# Patient Record
Sex: MALE | Race: WHITE | NOT HISPANIC OR LATINO | Employment: FULL TIME | URBAN - METROPOLITAN AREA
[De-identification: names, ages, dates, MRNs, and addresses within clinical notes are randomized per-mention and may not be internally consistent; named-entity substitution may affect disease eponyms.]

---

## 2018-10-10 ENCOUNTER — OFFICE VISIT (OUTPATIENT)
Dept: INTERNAL MEDICINE | Facility: CLINIC | Age: 33
End: 2018-10-10

## 2018-10-10 VITALS
BODY MASS INDEX: 27.58 KG/M2 | HEART RATE: 82 BPM | DIASTOLIC BLOOD PRESSURE: 72 MMHG | OXYGEN SATURATION: 97 % | WEIGHT: 182 LBS | SYSTOLIC BLOOD PRESSURE: 120 MMHG | TEMPERATURE: 97.9 F | HEIGHT: 68 IN

## 2018-10-10 DIAGNOSIS — Z87.898 HISTORY OF ALCOHOL USE: ICD-10-CM

## 2018-10-10 DIAGNOSIS — M54.6 ACUTE MIDLINE THORACIC BACK PAIN: Primary | ICD-10-CM

## 2018-10-10 DIAGNOSIS — M54.2 NECK PAIN: ICD-10-CM

## 2018-10-10 LAB
ALBUMIN SERPL-MCNC: 4.56 G/DL (ref 3.2–4.8)
ALBUMIN/GLOB SERPL: 1.9 G/DL (ref 1.5–2.5)
ALP SERPL-CCNC: 54 U/L (ref 25–100)
ALT SERPL W P-5'-P-CCNC: 17 U/L (ref 7–40)
ANION GAP SERPL CALCULATED.3IONS-SCNC: 4 MMOL/L (ref 3–11)
AST SERPL-CCNC: 34 U/L (ref 0–33)
BASOPHILS # BLD AUTO: 0.08 10*3/MM3 (ref 0–0.2)
BASOPHILS NFR BLD AUTO: 1 % (ref 0–1)
BILIRUB SERPL-MCNC: 0.4 MG/DL (ref 0.3–1.2)
BUN BLD-MCNC: 12 MG/DL (ref 9–23)
BUN/CREAT SERPL: 12.9 (ref 7–25)
CALCIUM SPEC-SCNC: 9.7 MG/DL (ref 8.7–10.4)
CHLORIDE SERPL-SCNC: 103 MMOL/L (ref 99–109)
CO2 SERPL-SCNC: 28 MMOL/L (ref 20–31)
CREAT BLD-MCNC: 0.93 MG/DL (ref 0.6–1.3)
DEPRECATED RDW RBC AUTO: 54.5 FL (ref 37–54)
EOSINOPHIL # BLD AUTO: 0.15 10*3/MM3 (ref 0–0.3)
EOSINOPHIL NFR BLD AUTO: 1.8 % (ref 0–3)
ERYTHROCYTE [DISTWIDTH] IN BLOOD BY AUTOMATED COUNT: 15.9 % (ref 11.3–14.5)
GFR SERPL CREATININE-BSD FRML MDRD: 94 ML/MIN/1.73
GLOBULIN UR ELPH-MCNC: 2.4 GM/DL
GLUCOSE BLD-MCNC: 86 MG/DL (ref 70–100)
HCT VFR BLD AUTO: 43.5 % (ref 38.9–50.9)
HGB BLD-MCNC: 14.5 G/DL (ref 13.1–17.5)
IMM GRANULOCYTES # BLD: 0.04 10*3/MM3 (ref 0–0.03)
IMM GRANULOCYTES NFR BLD: 0.5 % (ref 0–0.6)
LYMPHOCYTES # BLD AUTO: 2.81 10*3/MM3 (ref 0.6–4.8)
LYMPHOCYTES NFR BLD AUTO: 33.5 % (ref 24–44)
MCH RBC QN AUTO: 30.9 PG (ref 27–31)
MCHC RBC AUTO-ENTMCNC: 33.3 G/DL (ref 32–36)
MCV RBC AUTO: 92.6 FL (ref 80–99)
MONOCYTES # BLD AUTO: 0.94 10*3/MM3 (ref 0–1)
MONOCYTES NFR BLD AUTO: 11.2 % (ref 0–12)
NEUTROPHILS # BLD AUTO: 4.41 10*3/MM3 (ref 1.5–8.3)
NEUTROPHILS NFR BLD AUTO: 52.5 % (ref 41–71)
PLATELET # BLD AUTO: 290 10*3/MM3 (ref 150–450)
PMV BLD AUTO: 10.8 FL (ref 6–12)
POTASSIUM BLD-SCNC: 4.3 MMOL/L (ref 3.5–5.5)
PROT SERPL-MCNC: 7 G/DL (ref 5.7–8.2)
RBC # BLD AUTO: 4.7 10*6/MM3 (ref 4.2–5.76)
SODIUM BLD-SCNC: 135 MMOL/L (ref 132–146)
WBC NRBC COR # BLD: 8.39 10*3/MM3 (ref 3.5–10.8)

## 2018-10-10 PROCEDURE — 99203 OFFICE O/P NEW LOW 30 MIN: CPT | Performed by: NURSE PRACTITIONER

## 2018-10-10 PROCEDURE — 85025 COMPLETE CBC W/AUTO DIFF WBC: CPT | Performed by: NURSE PRACTITIONER

## 2018-10-10 PROCEDURE — 80053 COMPREHEN METABOLIC PANEL: CPT | Performed by: NURSE PRACTITIONER

## 2018-10-10 RX ORDER — NAPROXEN 250 MG/1
250 TABLET ORAL 2 TIMES DAILY PRN
Qty: 60 TABLET | Refills: 3 | Status: SHIPPED | OUTPATIENT
Start: 2018-10-10 | End: 2020-02-19

## 2018-10-10 RX ORDER — CYCLOBENZAPRINE HCL 10 MG
10 TABLET ORAL 3 TIMES DAILY PRN
Qty: 60 TABLET | Refills: 0 | Status: SHIPPED | OUTPATIENT
Start: 2018-10-10 | End: 2018-10-19 | Stop reason: DRUGHIGH

## 2018-10-10 RX ORDER — OMEPRAZOLE 40 MG/1
40 CAPSULE, DELAYED RELEASE ORAL DAILY
Qty: 30 CAPSULE | Refills: 1 | Status: SHIPPED | OUTPATIENT
Start: 2018-10-10 | End: 2020-02-19

## 2018-10-10 NOTE — PATIENT INSTRUCTIONS
Heat Therapy  Heat therapy can help ease sore, stiff, injured, and tight muscles and joints. Heat relaxes your muscles, which may help ease your pain.  What are the risks?  If you have any of the following conditions, do not use heat therapy unless your health care provider has approved:  · Poor circulation.  · Healing wounds or scarred skin in the area being treated.  · Diabetes, heart disease, or high blood pressure.  · Not being able to feel (numbness) the area being treated.  · Unusual swelling of the area being treated.  · Active infections.  · Blood clots.  · Cancer.  · Inability to communicate pain. This may include young children and people who have problems with their brain function (dementia).  · Pregnancy.    Heat therapy should only be used on old, pre-existing, or long-lasting (chronic) injuries. Do not use heat therapy on new injuries unless directed by your health care provider.  How to use heat therapy  There are several different kinds of heat therapy, including:  · Moist heat pack.  · Warm water bath.  · Hot water bottle.  · Electric heating pad.  · Heated gel pack.  · Heated wrap.  · Electric heating pad.    Use the heat therapy method suggested by your health care provider. Follow your health care provider's instructions on when and how to use heat therapy.  General heat therapy recommendations  · Do not sleep while using heat therapy. Only use heat therapy while you are awake.  · Your skin may turn pink while using heat therapy. Do not use heat therapy if your skin turns red.  · Do not use heat therapy if you have new pain.  · High heat or long exposure to heat can cause burns. Be careful when using heat therapy to avoid burning your skin.  · Do not use heat therapy on areas of your skin that are already irritated, such as with a rash or sunburn.  Contact a health care provider if:  · You have blisters, redness, swelling, or numbness.  · You have new pain.  · Your pain is worse.  This  information is not intended to replace advice given to you by your health care provider. Make sure you discuss any questions you have with your health care provider.  Document Released: 03/11/2013 Document Revised: 05/25/2017 Document Reviewed: 02/10/2015  [employer] Patient Education © 2018 [employer]
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
## 2018-10-12 ENCOUNTER — TELEPHONE (OUTPATIENT)
Dept: INTERNAL MEDICINE | Facility: CLINIC | Age: 33
End: 2018-10-12
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
## 2018-10-19 ENCOUNTER — OFFICE VISIT (OUTPATIENT)
Dept: INTERNAL MEDICINE | Facility: CLINIC | Age: 33
End: 2018-10-19

## 2018-10-19 VITALS
BODY MASS INDEX: 27.58 KG/M2 | WEIGHT: 182 LBS | HEART RATE: 68 BPM | SYSTOLIC BLOOD PRESSURE: 120 MMHG | OXYGEN SATURATION: 98 % | DIASTOLIC BLOOD PRESSURE: 78 MMHG | HEIGHT: 68 IN

## 2018-10-19 DIAGNOSIS — M54.2 NECK PAIN: Primary | ICD-10-CM

## 2018-10-19 DIAGNOSIS — M62.838 MUSCLE SPASM: ICD-10-CM

## 2018-10-19 PROCEDURE — 99213 OFFICE O/P EST LOW 20 MIN: CPT | Performed by: NURSE PRACTITIONER

## 2018-10-19 RX ORDER — TIZANIDINE 2 MG/1
2 TABLET ORAL EVERY 8 HOURS PRN
Qty: 30 TABLET | Refills: 0 | Status: SHIPPED | OUTPATIENT
Start: 2018-10-19 | End: 2019-04-27 | Stop reason: SDUPTHER

## 2018-10-19 NOTE — PROGRESS NOTES
Chief Complaint   Patient presents with   • Medication Problem     flexeril making patient drowsy       History of Present Illness  33 y.o.male presents for followup neck pain    Neck better but still some pain; no numbness or tingling.  Feels like flexaril helps but makes him too drowsy; using naprosyn prn.    Review of Systems   Constitutional: Negative for chills, fatigue and fever.   Gastrointestinal: Negative for abdominal pain.   Musculoskeletal: Positive for neck pain. Negative for back pain.         [facility]  The following portions of the patient's history were reviewed and updated as appropriate: allergies, current medications, past family history, past medical history, past social history, past surgical history and problem list.     Past Medical History:   Diagnosis Date   • Chronic back pain       No past surgical history on file.   No Known Allergies   Family History   Problem Relation Age of Onset   • Diabetes Mother    • Heart disease Maternal Grandfather    • Alcohol abuse Maternal Grandfather    • Cancer Father    • Alcohol abuse Father       Social History     Social History   • Marital status: Unknown     Spouse name: N/A   • Number of children: N/A   • Years of education: N/A     Occupational History   • Not on file.     Social History Main Topics   • Smoking status: Former Smoker     Types: Cigarettes   • Smokeless tobacco: Current User   • Alcohol use Yes      Comment: Quit 3 days ago.  Sometims would drink 5-10 drinks liquor or beer.   • Drug use: No   • Sexual activity: Defer     Other Topics Concern   • Not on file     Social History Narrative   • No narrative on file         Current Outpatient Prescriptions:   •  cyclobenzaprine (FLEXERIL) 10 MG tablet, Take 1 tablet by mouth 3 (Three) Times a Day As Needed for Muscle Spasms., Disp: 60 tablet, Rfl: 0  •  naproxen (NAPROSYN) 250 MG tablet, Take 1 tablet by mouth 2 (Two) Times a Day As Needed for Mild Pain . With food, Disp: 60 tablet, Rfl: 3  •   "omeprazole (priLOSEC) 40 MG capsule, Take 1 capsule by mouth Daily., Disp: 30 capsule, Rfl: 1    VITALS:  /78   Pulse 68   Ht 172.7 cm (68\")   Wt 82.6 kg (182 lb)   SpO2 98%   BMI 27.67 kg/m²     Physical Exam   Constitutional: He appears well-developed and well-nourished. No distress.   Neck: Normal range of motion. Neck supple.   Musculoskeletal:        Cervical back: He exhibits normal range of motion and no spasm.       LABS  No new labs    ASSESSMENT/PLAN  Sunny was seen today for medication problem and neck pain.    Diagnoses and all orders for this visit:    Neck pain  Comments:  cont prn naproxyn and heat therapy  Orders:  -     tiZANidine (ZANAFLEX) 2 MG tablet; Take 1 tablet by mouth Every 8 (Eight) Hours As Needed for Muscle Spasms.    Muscle spasm  -     tiZANidine (ZANAFLEX) 2 MG tablet; Take 1 tablet by mouth Every 8 (Eight) Hours As Needed for Muscle Spasms.    Dc'd flexaril, will try zanaflex.  Advised pt of side effect of dizziness; needs to try first in evening when he is going to be home; move slowly from lying, sitting to standing positions.    I discussed the patients findings and my recommendations with patient.     Patient was encouraged to keep me informed of any acute changes, lack of improvement, or any new concerning symptoms.    Patient voiced understanding of all instructions and denied further questions.      FOLLOW-UP  Return if symptoms worsen or fail to improve.    Electronically signed by:    Annabella Carrillo, YEE  10/19/2018      "

## 2018-11-26 ENCOUNTER — OFFICE VISIT (OUTPATIENT)
Dept: INTERNAL MEDICINE | Facility: CLINIC | Age: 33
End: 2018-11-26

## 2018-11-26 VITALS
DIASTOLIC BLOOD PRESSURE: 80 MMHG | WEIGHT: 183 LBS | HEIGHT: 68 IN | HEART RATE: 106 BPM | BODY MASS INDEX: 27.74 KG/M2 | SYSTOLIC BLOOD PRESSURE: 128 MMHG | OXYGEN SATURATION: 99 %

## 2018-11-26 DIAGNOSIS — M54.2 NECK PAIN: Primary | ICD-10-CM

## 2018-11-26 DIAGNOSIS — M54.6 ACUTE RIGHT-SIDED THORACIC BACK PAIN: ICD-10-CM

## 2018-11-26 PROCEDURE — 96372 THER/PROPH/DIAG INJ SC/IM: CPT | Performed by: NURSE PRACTITIONER

## 2018-11-26 PROCEDURE — 99214 OFFICE O/P EST MOD 30 MIN: CPT | Performed by: NURSE PRACTITIONER

## 2018-11-26 RX ORDER — METHYLPREDNISOLONE ACETATE 40 MG/ML
40 INJECTION, SUSPENSION INTRA-ARTICULAR; INTRALESIONAL; INTRAMUSCULAR; SOFT TISSUE ONCE
Status: COMPLETED | OUTPATIENT
Start: 2018-11-26 | End: 2018-11-26

## 2018-11-26 RX ADMIN — METHYLPREDNISOLONE ACETATE 40 MG: 40 INJECTION, SUSPENSION INTRA-ARTICULAR; INTRALESIONAL; INTRAMUSCULAR; SOFT TISSUE at 08:13

## 2018-11-26 NOTE — PROGRESS NOTES
Chief Complaint   Patient presents with   • Neck Pain     Follow Up, hurting more on right side, prescribed medication isn't working and making pt sleepy.       History of Present Illness  33 y.o.male presents for neck and upper back pain.    Still having some neck pain and now upper right thoracic pain; has been ongoing since July. Described as an ache to occasional sharp, nonradiating.  Has tried naprosyn, tizanidine, flexeril, chiropractor adjustments without any improvement.  Affects sleep and activities.  He normally lifts weights but has not been able to do his normal routine; does frequent stretches and foam roller.  Denies any extremity numbness tingling or radiating arm pain.  Has history of prior neck pain and left shoulder pain.      Review of Systems   Constitutional: Negative for chills and fever.   Musculoskeletal: Positive for arthralgias, back pain and neck pain. Negative for joint swelling and neck stiffness.         [facility]  The following portions of the patient's history were reviewed and updated as appropriate: allergies, current medications, past family history, past medical history, past social history, past surgical history and problem list.     Past Medical History:   Diagnosis Date   • Chronic back pain       No past surgical history on file.   No Known Allergies   Family History   Problem Relation Age of Onset   • Diabetes Mother    • Heart disease Maternal Grandfather    • Alcohol abuse Maternal Grandfather    • Cancer Father    • Alcohol abuse Father       Social History     Socioeconomic History   • Marital status: Unknown     Spouse name: Not on file   • Number of children: Not on file   • Years of education: Not on file   • Highest education level: Not on file   Social Needs   • Financial resource strain: Not on file   • Food insecurity - worry: Not on file   • Food insecurity - inability: Not on file   • Transportation needs - medical: Not on file   • Transportation needs - non-medical:  "Not on file   Occupational History   • Not on file   Tobacco Use   • Smoking status: Former Smoker     Types: Cigarettes   • Smokeless tobacco: Current User   Substance and Sexual Activity   • Alcohol use: Yes     Comment: Quit 3 days ago.  Sometims would drink 5-10 drinks liquor or beer.   • Drug use: No   • Sexual activity: Defer   Other Topics Concern   • Not on file   Social History Narrative   • Not on file         Current Outpatient Medications:   •  naproxen (NAPROSYN) 250 MG tablet, Take 1 tablet by mouth 2 (Two) Times a Day As Needed for Mild Pain . With food, Disp: 60 tablet, Rfl: 3  •  omeprazole (priLOSEC) 40 MG capsule, Take 1 capsule by mouth Daily., Disp: 30 capsule, Rfl: 1  •  tiZANidine (ZANAFLEX) 2 MG tablet, Take 1 tablet by mouth Every 8 (Eight) Hours As Needed for Muscle Spasms., Disp: 30 tablet, Rfl: 0    VITALS:  /80   Pulse 106   Ht 172.7 cm (68\")   Wt 83 kg (183 lb)   SpO2 99%   BMI 27.83 kg/m²     Physical Exam   Constitutional: He appears well-developed and well-nourished. No distress.   Musculoskeletal:        Cervical back: He exhibits tenderness and pain. He exhibits no swelling, no edema and no spasm.        Back:    Neurological: He is alert.       LABS  Results for orders placed or performed in visit on 10/10/18   CBC Auto Differential   Result Value Ref Range    WBC 8.39 3.50 - 10.80 10*3/mm3    RBC 4.70 4.20 - 5.76 10*6/mm3    Hemoglobin 14.5 13.1 - 17.5 g/dL    Hematocrit 43.5 38.9 - 50.9 %    MCV 92.6 80.0 - 99.0 fL    MCH 30.9 27.0 - 31.0 pg    MCHC 33.3 32.0 - 36.0 g/dL    RDW 15.9 (H) 11.3 - 14.5 %    RDW-SD 54.5 (H) 37.0 - 54.0 fl    MPV 10.8 6.0 - 12.0 fL    Platelets 290 150 - 450 10*3/mm3    Neutrophil % 52.5 41.0 - 71.0 %    Lymphocyte % 33.5 24.0 - 44.0 %    Monocyte % 11.2 0.0 - 12.0 %    Eosinophil % 1.8 0.0 - 3.0 %    Basophil % 1.0 0.0 - 1.0 %    Immature Grans % 0.5 0.0 - 0.6 %    Neutrophils, Absolute 4.41 1.50 - 8.30 10*3/mm3    Lymphocytes, Absolute " 2.81 0.60 - 4.80 10*3/mm3    Monocytes, Absolute 0.94 0.00 - 1.00 10*3/mm3    Eosinophils, Absolute 0.15 0.00 - 0.30 10*3/mm3    Basophils, Absolute 0.08 0.00 - 0.20 10*3/mm3    Immature Grans, Absolute 0.04 (H) 0.00 - 0.03 10*3/mm3   Comprehensive Metabolic Panel   Result Value Ref Range    Glucose 86 70 - 100 mg/dL    BUN 12 9 - 23 mg/dL    Creatinine 0.93 0.60 - 1.30 mg/dL    Sodium 135 132 - 146 mmol/L    Potassium 4.3 3.5 - 5.5 mmol/L    Chloride 103 99 - 109 mmol/L    CO2 28.0 20.0 - 31.0 mmol/L    Calcium 9.7 8.7 - 10.4 mg/dL    Total Protein 7.0 5.7 - 8.2 g/dL    Albumin 4.56 3.20 - 4.80 g/dL    ALT (SGPT) 17 7 - 40 U/L    AST (SGOT) 34 (H) 0 - 33 U/L    Alkaline Phosphatase 54 25 - 100 U/L    Total Bilirubin 0.4 0.3 - 1.2 mg/dL    eGFR Non African Amer 94 >60 mL/min/1.73    Globulin 2.4 gm/dL    A/G Ratio 1.9 1.5 - 2.5 g/dL    BUN/Creatinine Ratio 12.9 7.0 - 25.0    Anion Gap 4.0 3.0 - 11.0 mmol/L       ASSESSMENT/PLAN  Sunny was seen today for neck pain.    Diagnoses and all orders for this visit:    Neck pain  -     diclofenac (VOLTAREN) 1 % gel gel; Apply 4 g topically to the appropriate area as directed 4 (Four) Times a Day As Needed (joint pain).  -     XR Spine Cervical 2 or 3 View; Future  -     Ambulatory Referral to Physical Therapy  -     methylPREDNISolone acetate (DEPO-medrol) injection 40 mg; Inject 1 mL into the appropriate muscle as directed by prescriber 1 (One) Time.    Acute right-sided thoracic back pain  -     diclofenac (VOLTAREN) 1 % gel gel; Apply 4 g topically to the appropriate area as directed 4 (Four) Times a Day As Needed (joint pain).  -     XR spine thoracic 2 vw; Future  -     Ambulatory Referral to Physical Therapy  -     methylPREDNISolone acetate (DEPO-medrol) injection 40 mg; Inject 1 mL into the appropriate muscle as directed by prescriber 1 (One) Time.    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  Will  get plain film xrays.  If no improvement after PT for about 6 weeks would get MRI and refer him to an ortho back specialist.    I discussed the patients findings and my recommendations with patient.     Patient was encouraged to keep me informed of any acute changes, lack of improvement, or any new concerning symptoms.    Patient voiced understanding of all instructions and denied further questions.      FOLLOW-UP  Return if symptoms worsen or fail to improve.    Electronically signed by:    YEE Hardy  11/26/2018

## 2019-04-27 ENCOUNTER — OFFICE VISIT (OUTPATIENT)
Dept: INTERNAL MEDICINE | Facility: CLINIC | Age: 34
End: 2019-04-27

## 2019-04-27 VITALS
HEART RATE: 106 BPM | HEIGHT: 68 IN | DIASTOLIC BLOOD PRESSURE: 76 MMHG | BODY MASS INDEX: 27.58 KG/M2 | WEIGHT: 182 LBS | SYSTOLIC BLOOD PRESSURE: 124 MMHG | OXYGEN SATURATION: 96 %

## 2019-04-27 DIAGNOSIS — G47.00 INSOMNIA, UNSPECIFIED TYPE: ICD-10-CM

## 2019-04-27 DIAGNOSIS — M54.2 CERVICAL MUSCLE PAIN: Primary | ICD-10-CM

## 2019-04-27 PROCEDURE — 99214 OFFICE O/P EST MOD 30 MIN: CPT | Performed by: NURSE PRACTITIONER

## 2019-04-27 RX ORDER — TIZANIDINE 4 MG/1
4 TABLET ORAL EVERY 8 HOURS PRN
Qty: 30 TABLET | Refills: 1 | Status: SHIPPED | OUTPATIENT
Start: 2019-04-27 | End: 2020-02-19

## 2019-04-27 RX ORDER — AMITRIPTYLINE HYDROCHLORIDE 10 MG/1
10 TABLET, FILM COATED ORAL NIGHTLY PRN
Qty: 30 TABLET | Refills: 0 | Status: SHIPPED | OUTPATIENT
Start: 2019-04-27 | End: 2019-05-11

## 2019-04-27 NOTE — PATIENT INSTRUCTIONS
"Cervical Strain and Sprain With Rehab  Cervical strain and sprain are injuries that commonly occur with \"whiplash\" injuries. Whiplash occurs when the neck is forcefully whipped backward or forward, such as during a motor vehicle accident or during contact sports. The muscles, ligaments, tendons, discs, and nerves of the neck are susceptible to injury when this occurs.  RISK FACTORS  Risk of having a whiplash injury increases if:  · Osteoarthritis of the spine.  · Situations that make head or neck accidents or trauma more likely.  · High-risk sports (football, rugby, wrestling, hockey, auto racing, gymnastics, diving, contact karate, or boxing).  · Poor strength and flexibility of the neck.  · Previous neck injury.  · Poor tackling technique.  · Improperly fitted or padded equipment.  SYMPTOMS   · Pain or stiffness in the front or back of neck or both.  · Symptoms may present immediately or up to 24 hours after injury.  · Dizziness, headache, nausea, and vomiting.  · Muscle spasm with soreness and stiffness in the neck.  · Tenderness and swelling at the injury site.  PREVENTION  · Learn and use proper technique (avoid tackling with the head, spearing, and head-butting; use proper falling techniques to avoid landing on the head).  · Warm up and stretch properly before activity.  · Maintain physical fitness:    Strength, flexibility, and endurance.    Cardiovascular fitness.  · Wear properly fitted and padded protective equipment, such as padded soft collars, for participation in contact sports.  PROGNOSIS   Recovery from cervical strain and sprain injuries is dependent on the extent of the injury. These injuries are usually curable in 1 week to 3 months with appropriate treatment.   RELATED COMPLICATIONS   · Temporary numbness and weakness may occur if the nerve roots are damaged, and this may persist until the nerve has completely healed.  · Chronic pain due to frequent recurrence of symptoms.  · Prolonged healing, " especially if activity is resumed too soon (before complete recovery).  TREATMENT   Treatment initially involves the use of ice and medication to help reduce pain and inflammation. It is also important to perform strengthening and stretching exercises and modify activities that worsen symptoms so the injury does not get worse. These exercises may be performed at home or with a therapist. For patients who experience severe symptoms, a soft, padded collar may be recommended to be worn around the neck.   Improving your posture may help reduce symptoms. Posture improvement includes pulling your chin and abdomen in while sitting or standing. If you are sitting, sit in a firm chair with your buttocks against the back of the chair. While sleeping, try replacing your pillow with a small towel rolled to 2 inches in diameter, or use a cervical pillow or soft cervical collar. Poor sleeping positions delay healing.   For patients with nerve root damage, which causes numbness or weakness, the use of a cervical traction apparatus may be recommended. Surgery is rarely necessary for these injuries. However, cervical strain and sprains that are present at birth (congenital) may require surgery.  MEDICATION   · If pain medication is necessary, nonsteroidal anti-inflammatory medications, such as aspirin and ibuprofen, or other minor pain relievers, such as acetaminophen, are often recommended.  · Do not take pain medication for 7 days before surgery.  · Prescription pain relievers may be given if deemed necessary by your caregiver. Use only as directed and only as much as you need.  HEAT AND COLD:   · Cold treatment (icing) relieves pain and reduces inflammation. Cold treatment should be applied for 10 to 15 minutes every 2 to 3 hours for inflammation and pain and immediately after any activity that aggravates your symptoms. Use ice packs or an ice massage.  · Heat treatment may be used prior to performing the stretching and  "strengthening activities prescribed by your caregiver, physical therapist, or . Use a heat pack or a warm soak.  SEEK MEDICAL CARE IF:   · Symptoms get worse or do not improve in 2 weeks despite treatment.  · New, unexplained symptoms develop (drugs used in treatment may produce side effects).  EXERCISES  RANGE OF MOTION (ROM) AND STRETCHING EXERCISES - Cervical Strain and Sprain  These exercises may help you when beginning to rehabilitate your injury. In order to successfully resolve your symptoms, you must improve your posture. These exercises are designed to help reduce the forward-head and rounded-shoulder posture which contributes to this condition. Your symptoms may resolve with or without further involvement from your physician, physical therapist or . While completing these exercises, remember:   · Restoring tissue flexibility helps normal motion to return to the joints. This allows healthier, less painful movement and activity.  · An effective stretch should be held for at least 20 seconds, although you may need to begin with shorter hold times for comfort.  · A stretch should never be painful. You should only feel a gentle lengthening or release in the stretched tissue.  STRETCH- Axial Extensors  · Lie on your back on the floor. You may bend your knees for comfort. Place a rolled-up hand towel or dish towel, about 2 inches in diameter, under the part of your head that makes contact with the floor.  · Gently tuck your chin, as if trying to make a \"double chin,\" until you feel a gentle stretch at the base of your head.  · Hold __________ seconds.  Repeat __________ times. Complete this exercise __________ times per day.   STRETCH - Axial Extension   · Stand or sit on a firm surface. Assume a good posture: chest up, shoulders drawn back, abdominal muscles slightly tense, knees unlocked (if standing) and feet hip width apart.  · Slowly retract your chin so your head slides back " and your chin slightly lowers. Continue to look straight ahead.  · You should feel a gentle stretch in the back of your head. Be certain not to feel an aggressive stretch since this can cause headaches later.  · Hold for __________ seconds.  Repeat __________ times. Complete this exercise __________ times per day.  STRETCH - Cervical Side Bend   · Stand or sit on a firm surface. Assume a good posture: chest up, shoulders drawn back, abdominal muscles slightly tense, knees unlocked (if standing) and feet hip width apart.  · Without letting your nose or shoulders move, slowly tip your right / left ear to your shoulder until your feel a gentle stretch in the muscles on the opposite side of your neck.  · Hold __________ seconds.  Repeat __________ times. Complete this exercise __________ times per day.  STRETCH - Cervical Rotators   · Stand or sit on a firm surface. Assume a good posture: chest up, shoulders drawn back, abdominal muscles slightly tense, knees unlocked (if standing) and feet hip width apart.  · Keeping your eyes level with the ground, slowly turn your head until you feel a gentle stretch along the back and opposite side of your neck.  · Hold __________ seconds.  Repeat __________ times. Complete this exercise __________ times per day.  RANGE OF MOTION - Neck Circles   · Stand or sit on a firm surface. Assume a good posture: chest up, shoulders drawn back, abdominal muscles slightly tense, knees unlocked (if standing) and feet hip width apart.  · Gently roll your head down and around from the back of one shoulder to the back of the other. The motion should never be forced or painful.  · Repeat the motion 10-20 times, or until you feel the neck muscles relax and loosen.  Repeat __________ times. Complete the exercise __________ times per day.  STRENGTHENING EXERCISES - Cervical Strain and Sprain  These exercises may help you when beginning to rehabilitate your injury. They may resolve your symptoms with or  without further involvement from your physician, physical therapist, or . While completing these exercises, remember:   · Muscles can gain both the endurance and the strength needed for everyday activities through controlled exercises.  · Complete these exercises as instructed by your physician, physical therapist, or . Progress the resistance and repetitions only as guided.  · You may experience muscle soreness or fatigue, but the pain or discomfort you are trying to eliminate should never worsen during these exercises. If this pain does worsen, stop and make certain you are following the directions exactly. If the pain is still present after adjustments, discontinue the exercise until you can discuss the trouble with your clinician.  STRENGTH - Cervical Flexors, Isometric  · Face a wall, standing about 6 inches away. Place a small pillow, a ball about 6-8 inches in diameter, or a folded towel between your forehead and the wall.  · Slightly tuck your chin and gently push your forehead into the soft object. Push only with mild to moderate intensity, building up tension gradually. Keep your jaw and forehead relaxed.  · Hold 10 to 20 seconds. Keep your breathing relaxed.  · Release the tension slowly. Relax your neck muscles completely before you start the next repetition.  Repeat __________ times. Complete this exercise __________ times per day.  STRENGTH- Cervical Lateral Flexors, Isometric   · Stand about 6 inches away from a wall. Place a small pillow, a ball about 6-8 inches in diameter, or a folded towel between the side of your head and the wall.  · Slightly tuck your chin and gently tilt your head into the soft object. Push only with mild to moderate intensity, building up tension gradually. Keep your jaw and forehead relaxed.  · Hold 10 to 20 seconds. Keep your breathing relaxed.  · Release the tension slowly. Relax your neck muscles completely before you start the next  repetition.  Repeat __________ times. Complete this exercise __________ times per day.  STRENGTH - Cervical Extensors, Isometric   · Stand about 6 inches away from a wall. Place a small pillow, a ball about 6-8 inches in diameter, or a folded towel between the back of your head and the wall.  · Slightly tuck your chin and gently tilt your head back into the soft object. Push only with mild to moderate intensity, building up tension gradually. Keep your jaw and forehead relaxed.  · Hold 10 to 20 seconds. Keep your breathing relaxed.  · Release the tension slowly. Relax your neck muscles completely before you start the next repetition.  Repeat __________ times. Complete this exercise __________ times per day.  POSTURE AND BODY MECHANICS CONSIDERATIONS - Cervical Strain and Sprain  Keeping correct posture when sitting, standing or completing your activities will reduce the stress put on different body tissues, allowing injured tissues a chance to heal and limiting painful experiences. The following are general guidelines for improved posture. Your physician or physical therapist will provide you with any instructions specific to your needs. While reading these guidelines, remember:  · The exercises prescribed by your provider will help you have the flexibility and strength to maintain correct postures.  · The correct posture provides the optimal environment for your joints to work. All of your joints have less wear and tear when properly supported by a spine with good posture. This means you will experience a healthier, less painful body.  · Correct posture must be practiced with all of your activities, especially prolonged sitting and standing. Correct posture is as important when doing repetitive low-stress activities (typing) as it is when doing a single heavy-load activity (lifting).  PROLONGED STANDING WHILE SLIGHTLY LEANING FORWARD  When completing a task that requires you to lean forward while standing in one  place for a long time, place either foot up on a stationary 2- to 4-inch high object to help maintain the best posture. When both feet are on the ground, the low back tends to lose its slight inward curve. If this curve flattens (or becomes too large), then the back and your other joints will experience too much stress, fatigue more quickly, and can cause pain.   RESTING POSITIONS  Consider which positions are most painful for you when choosing a resting position. If you have pain with flexion-based activities (sitting, bending, stooping, squatting), choose a position that allows you to rest in a less flexed posture. You would want to avoid curling into a fetal position on your side. If your pain worsens with extension-based activities (prolonged standing, working overhead), avoid resting in an extended position such as sleeping on your stomach. Most people will find more comfort when they rest with their spine in a more neutral position, neither too rounded nor too arched. Lying on a non-sagging bed on your side with a pillow between your knees, or on your back with a pillow under your knees will often provide some relief. Keep in mind, being in any one position for a prolonged period of time, no matter how correct your posture, can still lead to stiffness.  WALKING  Walk with an upright posture. Your ears, shoulders, and hips should all line up.  OFFICE WORK  When working at a desk, create an environment that supports good, upright posture. Without extra support, muscles fatigue and lead to excessive strain on joints and other tissues.  CHAIR:  · A chair should be able to slide under your desk when your back makes contact with the back of the chair. This allows you to work closely.  · The chair's height should allow your eyes to be level with the upper part of your monitor and your hands to be slightly lower than your elbows.  · Body position:    Your feet should make contact with the floor. If this is not  possible, use a foot rest.    Keep your ears over your shoulders. This will reduce stress on your neck and low back.     This information is not intended to replace advice given to you by your health care provider. Make sure you discuss any questions you have with your health care provider.     Document Released: 12/18/2006 Document Revised: 01/08/2016 Document Reviewed: 11/23/2016  [employer] Patient Education ©2017 [employer]    Neck Exercises  Neck exercises can be important for many reasons:  · They can help you to improve and maintain flexibility in your neck. This can be especially important as you age.  · They can help to make your neck stronger. This can make movement easier.  · They can reduce or prevent neck pain.  · They may help your upper back.    Ask your health care provider which neck exercises would be best for you.  Exercises  Neck Press  Repeat this exercise 10 times. Do it first thing in the morning and right before bed or as told by your health care provider.  1. Lie on your back on a firm bed or on the floor with a pillow under your head.  2. Use your neck muscles to push your head down on the pillow and straighten your spine.  3. Hold the position as well as you can. Keep your head facing up and your chin tucked.  4. Slowly count to 5 while holding this position.  5. Relax for a few seconds. Then repeat.    Isometric Strengthening  Do a full set of these exercises 2 times a day or as told by your health care provider.  1. Sit in a supportive chair and place your hand on your forehead.  2. Push forward with your head and neck while pushing back with your hand. Hold for 10 seconds.  3. Relax. Then repeat the exercise 3 times.  4. Next, do the sequence again, this time putting your hand against the back of your head. Use your head and neck to push backward against the hand pressure.  5. Finally, do the same exercise on either side of your head, pushing sideways against the pressure  of your hand.    Prone Head Lifts  Repeat this exercise 5 times. Do this 2 times a day or as told by your health care provider.  1. Lie face-down, resting on your elbows so that your chest and upper back are raised.  2. Start with your head facing downward, near your chest. Position your chin either on or near your chest.  3. Slowly lift your head upward. Lift until you are looking straight ahead. Then continue lifting your head as far back as you can stretch.  4. Hold your head up for 5 seconds. Then slowly lower it to your starting position.    Supine Head Lifts  Repeat this exercise 8-10 times. Do this 2 times a day or as told by your health care provider.  1. Lie on your back, bending your knees to point to the ceiling and keeping your feet flat on the floor.  2. Lift your head slowly off the floor, raising your chin toward your chest.  3. Hold for 5 seconds.  4. Relax and repeat.    Scapular Retraction  Repeat this exercise 5 times. Do this 2 times a day or as told by your health care provider.  1. Stand with your arms at your sides. Look straight ahead.  2. Slowly pull both shoulders backward and downward until you feel a stretch between your shoulder blades in your upper back.  3. Hold for 10-30 seconds.  4. Relax and repeat.    Contact a health care provider if:  · Your neck pain or discomfort gets much worse when you do an exercise.  · Your neck pain or discomfort does not improve within 2 hours after you exercise.  If you have any of these problems, stop exercising right away. Do not do the exercises again unless your health care provider says that you can.  Get help right away if:  · You develop sudden, severe neck pain. If this happens, stop exercising right away. Do not do the exercises again unless your health care provider says that you can.  Exercises  Neck Stretch    Repeat this exercise 3-5 times.  1. Do this exercise while standing or while sitting in a chair.  2. Place your feet flat on the  floor, shoulder-width apart.  3. Slowly turn your head to the right. Turn it all the way to the right so you can look over your right shoulder. Do not tilt or tip your head.  4. Hold this position for 10-30 seconds.  5. Slowly turn your head to the left, to look over your left shoulder.  6. Hold this position for 10-30 seconds.    Neck Retraction  Repeat this exercise 8-10 times. Do this 3-4 times a day or as told by your health care provider.  1. Do this exercise while standing or while sitting in a sturdy chair.  2. Look straight ahead. Do not bend your neck.  3. Use your fingers to push your chin backward. Do not bend your neck for this movement. Continue to face straight ahead. If you are doing the exercise properly, you will feel a slight sensation in your throat and a stretch at the back of your neck.  4. Hold the stretch for 1-2 seconds. Relax and repeat.    This information is not intended to replace advice given to you by your health care provider. Make sure you discuss any questions you have with your health care provider.  Document Released: 11/28/2016 Document Revised: 05/25/2017 Document Reviewed: 06/27/2016  [employer] Interactive Patient Education © 2019 [employer]        OTC Aspercreme Lidocaine topical gel and Solonpas patches for pain

## 2019-04-27 NOTE — PROGRESS NOTES
Subjective   Sunny Drummond is a 34 y.o. male.     Neck Pain    This is a chronic problem. The current episode started 1 to 4 weeks ago. The problem occurs daily. The problem has been gradually worsening. The pain is associated with an unknown factor. The pain is present in the right side. The quality of the pain is described as aching and shooting. The pain is at a severity of 7/10. The pain is moderate. The symptoms are aggravated by position and twisting. The pain is same all the time. Stiffness is present all day. Pertinent negatives include no chest pain, fever, headaches, tingling or weakness. He has tried muscle relaxants and home exercises for the symptoms. The treatment provided mild relief.           Review of Systems   Constitutional: Negative for chills and fever.   Respiratory: Negative for cough.    Cardiovascular: Negative for chest pain.   Musculoskeletal: Positive for myalgias and neck pain.   Skin: Negative for rash.   Allergic/Immunologic: Negative for environmental allergies and immunocompromised state.   Neurological: Negative for tingling, weakness and headaches.   Hematological: Negative for adenopathy.       Objective   Physical Exam   Constitutional: He is oriented to person, place, and time. He appears well-developed and well-nourished. No distress.   HENT:   Head: Normocephalic and atraumatic.   Eyes: EOM are normal. Pupils are equal, round, and reactive to light.   Neck: Normal range of motion. Muscular tenderness (right side) present.   Cardiovascular: Normal rate, regular rhythm and normal heart sounds. Exam reveals no gallop and no friction rub.   No murmur heard.  Pulmonary/Chest: Effort normal and breath sounds normal.   Musculoskeletal: Normal range of motion.   Neurological: He is alert and oriented to person, place, and time.   Skin: Skin is warm and dry. Capillary refill takes less than 2 seconds. No rash noted. He is not diaphoretic.   Psychiatric: He has a normal mood and affect.  "His behavior is normal. Judgment and thought content normal.   Nursing note and vitals reviewed.      Assessment/Plan   Sunny was seen today for back pain and neck pain.    Diagnoses and all orders for this visit:    Cervical muscle pain  -     diclofenac (VOLTAREN) 1 % gel gel; Apply 4 g topically to the appropriate area as directed 4 (Four) Times a Day As Needed (joint pain).  -     tiZANidine (ZANAFLEX) 4 MG tablet; Take 1 tablet by mouth Every 8 (Eight) Hours As Needed for Muscle Spasms.  -     amitriptyline (ELAVIL) 10 MG tablet; Take 1 tablet by mouth At Night As Needed for Sleep.    Insomnia, unspecified type  -     amitriptyline (ELAVIL) 10 MG tablet; Take 1 tablet by mouth At Night As Needed for Sleep.     Try Elavil prn at night to help with sleep.  F/U as needed.    “I, the teaching provider, verify the accuracy of all student documentation.  I further attest that I was physically present during the HPI portion of the encounter, and personally performed/re-performed the exam, assessment, and plan.\" Samuel Malloy, MSN, APRN, FNP-C           "

## 2019-05-08 ENCOUNTER — OFFICE VISIT (OUTPATIENT)
Dept: INTERNAL MEDICINE | Facility: CLINIC | Age: 34
End: 2019-05-08

## 2019-05-08 ENCOUNTER — TELEPHONE (OUTPATIENT)
Dept: INTERNAL MEDICINE | Facility: CLINIC | Age: 34
End: 2019-05-08

## 2019-05-08 VITALS
SYSTOLIC BLOOD PRESSURE: 120 MMHG | HEIGHT: 68 IN | WEIGHT: 182 LBS | HEART RATE: 108 BPM | OXYGEN SATURATION: 97 % | DIASTOLIC BLOOD PRESSURE: 68 MMHG | BODY MASS INDEX: 27.58 KG/M2

## 2019-05-08 DIAGNOSIS — G89.29 CHRONIC RIGHT SHOULDER PAIN: ICD-10-CM

## 2019-05-08 DIAGNOSIS — G89.29 OTHER CHRONIC PAIN: ICD-10-CM

## 2019-05-08 DIAGNOSIS — M54.9 UPPER BACK PAIN: Primary | ICD-10-CM

## 2019-05-08 DIAGNOSIS — M54.2 NECK PAIN: ICD-10-CM

## 2019-05-08 DIAGNOSIS — M25.511 CHRONIC RIGHT SHOULDER PAIN: ICD-10-CM

## 2019-05-08 PROCEDURE — 99213 OFFICE O/P EST LOW 20 MIN: CPT | Performed by: NURSE PRACTITIONER

## 2019-05-08 NOTE — PATIENT INSTRUCTIONS
Duloxetine delayed-release capsules  What is this medicine?  DULOXETINE (doo LOX e teen) is used to treat depression, anxiety, and different types of chronic pain.  This medicine may be used for other purposes; ask your health care provider or pharmacist if you have questions.  COMMON BRAND NAME(S): Nixon Garnica  What should I tell my health care provider before I take this medicine?  They need to know if you have any of these conditions:  -bipolar disorder or a family history of bipolar disorder  -glaucoma  -kidney disease  -liver disease  -suicidal thoughts or a previous suicide attempt  -taken medicines called MAOIs like Carbex, Eldepryl, Marplan, Nardil, and Parnate within 14 days  -an unusual reaction to duloxetine, other medicines, foods, dyes, or preservatives  -pregnant or trying to get pregnant  -breast-feeding  How should I use this medicine?  Take this medicine by mouth with a glass of water. Follow the directions on the prescription label. Do not cut, crush or chew this medicine. You can take this medicine with or without food. Take your medicine at regular intervals. Do not take your medicine more often than directed. Do not stop taking this medicine suddenly except upon the advice of your doctor. Stopping this medicine too quickly may cause serious side effects or your condition may worsen.  A special MedGuide will be given to you by the pharmacist with each prescription and refill. Be sure to read this information carefully each time.  Talk to your pediatrician regarding the use of this medicine in children. While this drug may be prescribed for children as young as 7 years of age for selected conditions, precautions do apply.  Overdosage: If you think you have taken too much of this medicine contact a poison control center or emergency room at once.  NOTE: This medicine is only for you. Do not share this medicine with others.  What if I miss a dose?  If you miss a dose, take it as soon as you  can. If it is almost time for your next dose, take only that dose. Do not take double or extra doses.  What may interact with this medicine?  Do not take this medicine with any of the following medications:  -desvenlafaxine  -levomilnacipran  -linezolid  -MAOIs like Carbex, Eldepryl, Marplan, Nardil, and Parnate  -methylene blue (injected into a vein)  -milnacipran  -thioridazine  -venlafaxine  This medicine may also interact with the following medications:  -alcohol  -amphetamines  -aspirin and aspirin-like medicines  -certain antibiotics like ciprofloxacin and enoxacin  -certain medicines for blood pressure, heart disease, irregular heart beat  -certain medicines for depression, anxiety, or psychotic disturbances  -certain medicines for migraine headache like almotriptan, eletriptan, frovatriptan, naratriptan, rizatriptan, sumatriptan, zolmitriptan  -certain medicines that treat or prevent blood clots like warfarin, enoxaparin, and dalteparin  -cimetidine  -fentanyl  -lithium  -NSAIDS, medicines for pain and inflammation, like ibuprofen or naproxen  -phentermine  -procarbazine  -rasagiline  -sibutramine  -[place]'s wort  -theophylline  -tramadol  -tryptophan  This list may not describe all possible interactions. Give your health care provider a list of all the medicines, herbs, non-prescription drugs, or dietary supplements you use. Also tell them if you smoke, drink alcohol, or use illegal drugs. Some items may interact with your medicine.  What should I watch for while using this medicine?  Tell your doctor if your symptoms do not get better or if they get worse. Visit your doctor or health care professional for regular checks on your progress. Because it may take several weeks to see the full effects of this medicine, it is important to continue your treatment as prescribed by your doctor.  Patients and their families should watch out for new or worsening thoughts of suicide or depression. Also watch out for  sudden changes in feelings such as feeling anxious, agitated, panicky, irritable, hostile, aggressive, impulsive, severely restless, overly excited and hyperactive, or not being able to sleep. If this happens, especially at the beginning of treatment or after a change in dose, call your health care professional.  You may get drowsy or dizzy. Do not drive, use machinery, or do anything that needs mental alertness until you know how this medicine affects you. Do not stand or sit up quickly, especially if you are an older patient. This reduces the risk of dizzy or fainting spells. Alcohol may interfere with the effect of this medicine. Avoid alcoholic drinks.  This medicine can cause an increase in blood pressure. This medicine can also cause a sudden drop in your blood pressure, which may make you feel faint and increase the chance of a fall. These effects are most common when you first start the medicine or when the dose is increased, or during use of other medicines that can cause a sudden drop in blood pressure. Check with your doctor for instructions on monitoring your blood pressure while taking this medicine.  Your mouth may get dry. Chewing sugarless gum or sucking hard candy, and drinking plenty of water may help. Contact your doctor if the problem does not go away or is severe.  What side effects may I notice from receiving this medicine?  Side effects that you should report to your doctor or health care professional as soon as possible:  -allergic reactions like skin rash, itching or hives, swelling of the face, lips, or tongue  -anxious  -breathing problems  -confusion  -changes in vision  -chest pain  -confusion  -elevated mood, decreased need for sleep, racing thoughts, impulsive behavior  -eye pain  -fast, irregular heartbeat  -feeling faint or lightheaded, falls  -feeling agitated, angry, or irritable  -hallucination, loss of contact with reality  -high blood pressure  -loss of balance or  coordination  -palpitations  -redness, blistering, peeling or loosening of the skin, including inside the mouth  -restlessness, pacing, inability to keep still  -seizures  -stiff muscles  -suicidal thoughts or other mood changes  -trouble passing urine or change in the amount of urine  -trouble sleeping  -unusual bleeding or bruising  -unusually weak or tired  -vomiting  -yellowing of the eyes or skin  Side effects that usually do not require medical attention (report to your doctor or health care professional if they continue or are bothersome):  -change in sex drive or performance  -change in appetite or weight  -constipation  -dizziness  -dry mouth  -headache  -increased sweating  -nausea  -tired  This list may not describe all possible side effects. Call your doctor for medical advice about side effects. You may report side effects to FDA at 7-632-FDA-6301.  Where should I keep my medicine?  Keep out of the reach of children.  Store at room temperature between 20 and 25 degrees C (68 to 77 degrees F). Throw away any unused medicine after the expiration date.  NOTE: This sheet is a summary. It may not cover all possible information. If you have questions about this medicine, talk to your doctor, pharmacist, or health care provider.  © 2019 [employer]vier/Gold Standard (2017-05-18 18:16:03)

## 2019-05-08 NOTE — PROGRESS NOTES
Chief Complaint   Patient presents with   • Neck Pain     Pain has improved in neck, not in other areas   • Shoulder Pain   • Back Pain       History of Present Illness  34 y.o.male presents for neck back and shoulder pain.  This is a chronic problem with recent exacerbation fall 2018. Sx have improved in neck but still having upper right and right shoulder pain. Has tried naproxen, flexaril, zanaflex, voltaren gel, PT, steroids, and elavil.  Stopped elavil back to melatonin. Still having some trouble sleeping due to pain.  He wanted to go back to PT and do dry needling to see if would help. No arm weakness or numbness tingling. Works out lifts weights routinely.  Occasional depression not much anxiety.     Review of Systems   Constitutional: Negative for chills and fever.   Musculoskeletal: Positive for arthralgias, back pain and neck pain.   Neurological: Negative for weakness and numbness.   Psychiatric/Behavioral: Positive for sleep disturbance and depressed mood. Negative for self-injury, suicidal ideas and stress. The patient is not nervous/anxious.        [facility]  The following portions of the patient's history were reviewed and updated as appropriate: allergies, current medications, past family history, past medical history, past social history, past surgical history and problem list.       Past Medical History:   Diagnosis Date   • Chronic back pain       No past surgical history on file.   No Known Allergies   Family History   Problem Relation Age of Onset   • Diabetes Mother    • Heart disease Maternal Grandfather    • Alcohol abuse Maternal Grandfather    • Cancer Father    • Alcohol abuse Father             Current Outpatient Medications:   •  amitriptyline (ELAVIL) 10 MG tablet, Take 1 tablet by mouth At Night As Needed for Sleep., Disp: 30 tablet, Rfl: 0  •  diclofenac (VOLTAREN) 1 % gel gel, Apply 4 g topically to the appropriate area as directed 4 (Four) Times a Day As Needed (joint pain)., Disp: 1  "tube, Rfl: 1  •  tiZANidine (ZANAFLEX) 4 MG tablet, Take 1 tablet by mouth Every 8 (Eight) Hours As Needed for Muscle Spasms., Disp: 30 tablet, Rfl: 1  •  naproxen (NAPROSYN) 250 MG tablet, Take 1 tablet by mouth 2 (Two) Times a Day As Needed for Mild Pain . With food, Disp: 60 tablet, Rfl: 3  •  omeprazole (priLOSEC) 40 MG capsule, Take 1 capsule by mouth Daily., Disp: 30 capsule, Rfl: 1    VITALS:  /68   Pulse 108   Ht 172.7 cm (68\")   Wt 82.6 kg (182 lb)   SpO2 97%   BMI 27.67 kg/m²     Physical Exam   Constitutional: He appears well-developed and well-nourished. No distress.   Pulmonary/Chest: Effort normal.   Musculoskeletal:        Right shoulder: He exhibits tenderness. He exhibits normal range of motion, no bony tenderness and normal strength.        Cervical back: He exhibits decreased range of motion and tenderness. He exhibits no spasm.   Neurological: He is alert.       LABS  No new labs    ASSESSMENT/PLAN  Sunny was seen today for neck pain, shoulder pain and back pain.    Diagnoses and all orders for this visit:    Upper back pain  -     Ambulatory Referral to Physical Therapy Evaluate and treat (upper back pain)    Neck pain  Comments:  improved    Chronic right shoulder pain  -     Ambulatory Referral to Physical Therapy Evaluate and treat (upper back pain)    Other chronic pain    cont Voltaren gel, naproxen, zanaflex.  He didn't think amitriptyline did much.  Discussed cymbalta as an option that works well with chronic pain and sleep and mild depression.  He wanted to ready about the cymalta first.  He called back after appt and would like to try the cymbalta.  Advised needs to take every day; may take several weeks to help.  Any thoughts of self harm or suicidal ideations should contact me immediately or seek emergency are.    I had ordered previous cervical thoracic films; pt never got done.    If worsening of symptoms or no improvement in symptoms, patient should contact our office " for further evaluation treatment or seek urgent care.    I discussed the patients findings and my recommendations with patient.  Patient was encouraged to keep me informed of any acute changes, lack of improvement, or any new concerning symptoms.    Patient voiced understanding of all instructions and denied further questions.      FOLLOW-UP  Return if symptoms worsen or fail to improve.    Electronically signed by:    YEE Hardy  05/08/2019

## 2019-05-10 NOTE — TELEPHONE ENCOUNTER
Pt is calling back because the prescription was not sent in.     Can  You please send the prescription to Mikie at [facility]?

## 2019-05-11 RX ORDER — DULOXETIN HYDROCHLORIDE 30 MG/1
30 CAPSULE, DELAYED RELEASE ORAL DAILY
Qty: 30 CAPSULE | Refills: 0 | Status: SHIPPED | OUTPATIENT
Start: 2019-05-11 | End: 2020-02-19

## 2019-05-12 NOTE — TELEPHONE ENCOUNTER
Sorry, I had thought done.  I sent cymbalta.  Please remind him he needs to take daily; doesn't work to just take every once in a while. May take 3-4 weeks to help.  In some pt's these meds can initially worsen symptoms of depression.  Any thoughts of suicidal ideations or self harm he needs to contact me immediately or seek emergency care.  I will see him in 4 weeks.

## 2019-05-21 ENCOUNTER — HOSPITAL ENCOUNTER (OUTPATIENT)
Dept: PHYSICAL THERAPY | Facility: HOSPITAL | Age: 34
Setting detail: THERAPIES SERIES
Discharge: HOME | End: 2019-05-21

## 2019-05-21 DIAGNOSIS — M54.9 UPPER BACK PAIN: Primary | ICD-10-CM

## 2019-05-21 PROCEDURE — 97161 PT EVAL LOW COMPLEX 20 MIN: CPT | Performed by: PHYSICAL THERAPIST
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
## 2019-05-30 ENCOUNTER — HOSPITAL ENCOUNTER (OUTPATIENT)
Dept: PHYSICAL THERAPY | Facility: HOSPITAL | Age: 34
Setting detail: THERAPIES SERIES
Discharge: HOME | End: 2019-05-30

## 2019-05-30 DIAGNOSIS — M54.9 UPPER BACK PAIN: Primary | ICD-10-CM

## 2019-05-30 PROCEDURE — 97140 MANUAL THERAPY 1/> REGIONS: CPT

## 2019-05-30 NOTE — THERAPY TREATMENT NOTE
Outpatient Physical Therapy Ortho Treatment Note  [facility]     Patient Name: Sunny Drummond  : 1985  MRN: [id number]  Today's Date: 2019      Visit Date: 2019    Visit Dx:    ICD-10-CM ICD-9-CM   1. Upper back pain M54.9 724.5       There is no problem list on file for this patient.       Past Medical History:   Diagnosis Date   • Chronic back pain         No past surgical history on file.    PT Assessment/Plan     Row Name 19 1347          PT Assessment    Assessment Comments  Pt tolerated TDN well, including upper traps and cervical multifidi.  Pt demonstrated and experienced improved mobility post-treatment.  Advised pt to perform light exercise for upper traps/shoulders during his workout this evening ([place], [place] press).   -AC        PT Plan    PT Plan Comments  Continue TDN depending on continued PT response, progressing exercises as indicated.   -AC       User Key  (r) = Recorded By, (t) = Taken By, (c) = Cosigned By    Initials Name Provider Type    Kayleigh Page, PT Physical Therapist        Exercises     Row Name 19 6619             Subjective Comments    Subjective Comments  Pt states he is having neck/upper back pain today.  Has been affecting his sleep.  Has been trying exercises at home/gym provided to him during IE.  -AC         Subjective Pain    Able to rate subjective pain?  yes  -AC      Pre-Treatment Pain Level  7  -AC      Post-Treatment Pain Level  6  -AC         Total Minutes    72284 - PT Manual Therapy Minutes  15  -AC        User Key  (r) = Recorded By, (t) = Taken By, (c) = Cosigned By    Initials Name Provider Type    Kayleigh Page, PT Physical Therapist           Manual Rx (last 36 hours)      Manual Treatments     Row Name 19 1349             Total Minutes    45518 - PT Manual Therapy Minutes  15  -AC         Manual Rx 1    Manual Rx 1 Location  Upper traps  -AC      Manual Rx 1 Type  Soft tissue mobilization using strumming and  sustained pressure  -AC      Manual Rx 1 Grade  Moderate pressure  -AC      Manual Rx 1 Duration  15  -AC         Manual Rx 2    Manual Rx 2 Location  Upper traps, multifidi  -AC      Manual Rx 2 Type  TDN to upper traps (4 insertions each), and lower cervical multifidi sustained for 10 mins  -AC      Manual Rx 2 Duration    Not included in billed MT time  -AC        User Key  (r) = Recorded By, (t) = Taken By, (c) = Cosigned By    Initials Name Provider Type    AC Kayleigh Boss, PT Physical Therapist        Time Calculation:   Start Time: 1345  Therapy Charges for Today     Code Description Service Date Service Provider Modifiers Qty    [id number]  PT MANUAL THERAPY EA 15 MIN 5/30/2019 Kayleigh Boss, PT GP 1        Kayleigh Boss, VIRAJ  5/30/2019

## 2019-06-04 ENCOUNTER — HOSPITAL ENCOUNTER (OUTPATIENT)
Dept: PHYSICAL THERAPY | Facility: HOSPITAL | Age: 34
Setting detail: THERAPIES SERIES
Discharge: HOME | End: 2019-06-04

## 2019-06-04 DIAGNOSIS — M54.9 UPPER BACK PAIN: Primary | ICD-10-CM

## 2019-06-04 PROCEDURE — 97140 MANUAL THERAPY 1/> REGIONS: CPT

## 2019-06-04 NOTE — THERAPY TREATMENT NOTE
Outpatient Physical Therapy Ortho Treatment Note  [facility]     Patient Name: Sunny Drummond  : 1985  MRN: [id number]  Today's Date: 2019      Visit Date: 2019    Visit Dx:    ICD-10-CM ICD-9-CM   1. Upper back pain M54.9 724.5       There is no problem list on file for this patient.       Past Medical History:   Diagnosis Date   • Chronic back pain      No past surgical history on file.    PT Assessment/Plan     Row Name 19 1430          PT Assessment    Assessment Comments  Pt continues to respond well to TDN, noting improved mobility immediately after sessions and improved pain 25 hours following intervention.  Pt continues to demonstrate notable TrPs, localized to R side of UTs.  Pt also c/o upper thoracic pain as well.  Advised pt to continue focusing on light UT work at the gym along with scapular stability exercises.   -AC        PT Plan    PT Plan Comments  Incorporate TDN for upper thoracic area next session, progressing exercises as indicated.   -AC       User Key  (r) = Recorded By, (t) = Taken By, (c) = Cosigned By    Initials Name Provider Type    AC Kayleigh Boss, PT Physical Therapist        Exercises     Row Name 19 1430             Subjective Comments    Subjective Comments  Pt states he felt relief in neck pain for 5 days after TDN, but increased last night after sleeping.  -AC         Subjective Pain    Able to rate subjective pain?  yes  -AC      Pre-Treatment Pain Level  7  -AC      Post-Treatment Pain Level  8  -AC         Total Minutes    50425 - PT Manual Therapy Minutes  25  -AC        User Key  (r) = Recorded By, (t) = Taken By, (c) = Cosigned By    Initials Name Provider Type    Kayleigh Page, PT Physical Therapist           Manual Rx (last 36 hours)      Manual Treatments     Row Name 19 1430             Total Minutes    34691 - PT Manual Therapy Minutes  25  -AC         Manual Rx 1    Manual Rx 1 Location  Upper traps  -AC       Manual Rx 1 Type  Soft tissue mobilization using strumming and sustained pressure  -AC      Manual Rx 1 Grade  Moderate pressure  -AC      Manual Rx 1 Duration  15  -AC         Manual Rx 2    Manual Rx 2 Location  Upper traps, multifidi  -AC      Manual Rx 2 Type  TDN to upper traps (4-5 insertions each), and lower cervical multifidi sustained for 10 mins  -AC      Manual Rx 2 Duration  -- Not included in billed time  -AC         Manual Rx 3    Manual Rx 3 Location  Thoracic spine  -AC      Manual Rx 3 Type  PA mobilizations through upper/mid thoracic spine with pt in prone. MWM to to R facet of T3 with L rotation  -AC      Manual Rx 3 Grade  III  -AC      Manual Rx 3 Duration  10  -AC        User Key  (r) = Recorded By, (t) = Taken By, (c) = Cosigned By    Initials Name Provider Type    AC Kayleigh Boss, PT Physical Therapist        Time Calculation:   Start Time: 1430  Therapy Charges for Today     Code Description Service Date Service Provider Modifiers Qty    81132171912 HC PT MANUAL THERAPY EA 15 MIN 6/4/2019 Kayleigh Boss, PT GP 2        Kayleigh Boss, PT  6/4/2019

## 2019-06-13 ENCOUNTER — HOSPITAL ENCOUNTER (OUTPATIENT)
Dept: PHYSICAL THERAPY | Facility: HOSPITAL | Age: 34
Discharge: HOME | End: 2019-06-13
Admitting: NURSE PRACTITIONER

## 2019-06-13 DIAGNOSIS — M54.9 UPPER BACK PAIN: Primary | ICD-10-CM

## 2019-06-13 PROCEDURE — 97140 MANUAL THERAPY 1/> REGIONS: CPT

## 2019-06-13 PROCEDURE — 97110 THERAPEUTIC EXERCISES: CPT

## 2019-06-13 NOTE — THERAPY TREATMENT NOTE
Outpatient Physical Therapy Ortho Treatment Note  [facility]     Patient Name: Sunny Drummond  : 1985  MRN: [id number]  Today's Date: 2019      Visit Date: 2019    Visit Dx:    ICD-10-CM ICD-9-CM   1. Upper back pain M54.9 724.5      Past Medical History:   Diagnosis Date   • Chronic back pain        PT Ortho     Row Name 19 1300       Cervical/Shoulder ROM Screen    Cervical flexion  Normal  -MM    Cervical extension  Impaired Mild loss of motion.  -MM    Cervical lateral flexion  Impaired Mild restriction with right rotation.  Increased pain right.  -MM    Cervical rotation  Impaired Mild restriction right with increased pain  -MM      User Key  (r) = Recorded By, (t) = Taken By, (c) = Cosigned By    Initials Name Provider Type    Jerad Norris, PT Physical Therapist          PT Assessment/Plan     Row Name 19 1300          PT Assessment    Assessment Comments  No complaints with manual therapy.  -MM        PT Plan    PT Plan Comments  Continue per plan of treatment updating exercises as needed and continue manual therapy with TDN also included.   -MM       User Key  (r) = Recorded By, (t) = Taken By, (c) = Cosigned By    Initials Name Provider Type    Jerad Norris, PT Physical Therapist            Exercises     Row Name 19 1300             Subjective Comments    Subjective Comments  Client reports frequent neck and midthoracic pain in mid trap region.  -MM         Subjective Pain    Able to rate subjective pain?  yes  -MM      Pre-Treatment Pain Level  5  -MM      Post-Treatment Pain Level  4  -MM         Total Minutes    20953 - PT Therapeutic Exercise Minutes  15  -MM      56582 - PT Manual Therapy Minutes  15  -MM         Exercise 1    Exercise Name 1  Include education for exercises to minimize pain and improve mobility.  Exercises for home program included: Seated cervical retraction combined with extension using a towel.  Also included cervical  retraction with rotation to the right.  For headaches included cervical retraction with extension and head wobble.  Overall mobility was assessed along with tolerance to repetitive movement.  -MM      Cueing 1  Verbal  -MM      Time 1  15  -MM      Additional Comments  Therapeutic exercise  -MM        User Key  (r) = Recorded By, (t) = Taken By, (c) = Cosigned By    Initials Name Provider Type    Jerad Norris, PT Physical Therapist            Manual Rx (last 36 hours)      Manual Treatments     Row Name 06/13/19 1300             Total Minutes    01587 - PT Manual Therapy Minutes  15  -MM         Manual Rx 1    Manual Rx 1 Location  Bilateral cervical region, bilateral upper trap, and mid back.  -MM      Manual Rx 1 Type  Provided soft tissue mobilization using ASTYM technique.  Client was positioned prone and moderate pressure was used with ASTYM.   -MM      Manual Rx 1 Duration  15  -MM         Manual Rx 2    Manual Rx 2 Location  Cervical region/upper trap/levator scap  -MM      Manual Rx 2 Type  Included TDN for 8 locations each side. Not included in timed treatment.   -MM        User Key  (r) = Recorded By, (t) = Taken By, (c) = Cosigned By    Initials Name Provider Type    Jerad Norris, PT Physical Therapist        Time Calculation:   Start Time: 1300  Therapy Charges for Today     Code Description Service Date Service Provider Modifiers Qty    11886573977  PT THER PROC EA 15 MIN 6/13/2019 Jerad Oh, PT GP 1    [id number]  PT MANUAL THERAPY EA 15 MIN 6/13/2019 Jerad Oh, PT GP 1        Jerad Oh PT  6/13/2019

## 2019-06-24 ENCOUNTER — HOSPITAL ENCOUNTER (OUTPATIENT)
Dept: PHYSICAL THERAPY | Facility: HOSPITAL | Age: 34
Setting detail: THERAPIES SERIES
Discharge: HOME | End: 2019-06-24

## 2019-06-24 DIAGNOSIS — M54.9 UPPER BACK PAIN: Primary | ICD-10-CM

## 2019-06-24 PROCEDURE — 97110 THERAPEUTIC EXERCISES: CPT

## 2019-06-24 PROCEDURE — 97140 MANUAL THERAPY 1/> REGIONS: CPT

## 2019-06-24 NOTE — THERAPY PROGRESS REPORT/RE-CERT
Outpatient Physical Therapy Ortho Progress Note  [facility]     Patient Name: Sunny Drummond  : 1985  MRN: [id number]  Today's Date: 2019      Visit Date: 2019    Visit Dx:    ICD-10-CM ICD-9-CM   1. Upper back pain M54.9 724.5     Past Medical History:   Diagnosis Date   • Chronic back pain        PT Ortho     Row Name 19 1100       Posture/Observations    Posture/Observations Comments  Palpation: Mild tenderness right upper trap and R cervical paravertebrals.   -MM       Cervical/Shoulder ROM Screen    Cervical flexion  Normal  -MM    Cervical extension  Impaired 38 degrees  -MM    Cervical lateral flexion  Impaired 32/32  -MM    Cervical rotation  Impaired R: 68; L: 65.   -MM      User Key  (r) = Recorded By, (t) = Taken By, (c) = Cosigned By    Initials Name Provider Type    Jerad Norris PT Physical Therapist          PT Assessment/Plan     Row Name 19 1100          PT Assessment    Assessment Comments  Client continues to struggle with neck/upper trap pain. Overall mobility is good with mild restrictions for extension and rotation. Further skilled care is required to minimize pain.   -MM     Please refer to paper survey for additional self-reported information  Yes  -MM     Rehab Potential  Fair  -MM     Patient/caregiver participated in establishment of treatment plan and goals  Yes  -MM     Patient would benefit from skilled therapy intervention  Yes  -MM        PT Plan    PT Frequency  1x/week  -MM     Predicted Duration of Therapy Intervention (Therapy Eval)  2-4 visits  -MM     Planned CPT's?  PT THER PROC EA 15 MIN: 06605;PT MANUAL THERAPY EA 15 MIN: 55711;PT TRACTION CERVICAL: 29076  -MM     PT Plan Comments  Continue for 2-4 more visits with manual therapy to minimize pain and improve mobility.   -MM       User Key  (r) = Recorded By, (t) = Taken By, (c) = Cosigned By    Initials Name Provider Type    Jerad Norris PT Physical Therapist             Exercises     Row Name 06/24/19 1100             Subjective Comments    Subjective Comments  Client reports moderate pain today. He reports some benefit from dry needling last visit.   -MM         Subjective Pain    Able to rate subjective pain?  yes  -MM      Pre-Treatment Pain Level  5  -MM      Post-Treatment Pain Level  4  -MM         Total Minutes    72050 - PT Therapeutic Exercise Minutes  15  -MM      77454 - PT Manual Therapy Minutes  15  -MM         Exercise 1    Exercise Name 1  UPdated and reviewed exercises to include: seated neck extension with towel, neck rotation with towel, thoracic and neck extension over foam, and neck extension off table.   -MM      Cueing 1  Verbal  -MM      Time 1  15  -MM      Additional Comments  ther ex  -MM        User Key  (r) = Recorded By, (t) = Taken By, (c) = Cosigned By    Initials Name Provider Type    Jerad Norris, PT Physical Therapist            Manual Rx (last 36 hours)      Manual Treatments     Row Name 06/24/19 1100             Total Minutes    40878 - PT Manual Therapy Minutes  15  -MM         Manual Rx 1    Manual Rx 1 Location  Included mobilization with movement for cervical lateral flexion, rotation, and combined lateral flexion and rotation. Also included manual traction.   -MM      Manual Rx 1 Duration  15  -MM         Manual Rx 2    Manual Rx 2 Location  Cervical region/upper trap/levator scap  -MM      Manual Rx 2 Type  Included TDN for 8 locations each side. Not included in timed treatment.   -MM        User Key  (r) = Recorded By, (t) = Taken By, (c) = Cosigned By    Initials Name Provider Type    Jerad Norris, PT Physical Therapist          PT OP Goals     Row Name 06/24/19 1100          PT Short Term Goals    STG Date to Achieve  06/18/19  -MM     STG 1  Client will demonstrate independence with initial HEP  -MM     STG 1 Progress  Ongoing  -MM     STG 2  Client will report pain at worst < 5/10  -MM     STG 2 Progress   Ongoing  -MM     STG 3  Client will complete work day with pain < 4/10  -MM     STG 3 Progress  Ongoing  -MM        Long Term Goals    LTG Date to Achieve  07/16/19  -MM     LTG 1  Client will be independent with HEP  -MM     LTG 1 Progress  Ongoing  -MM     LTG 2  Client will complete work day with Pain < 2/10  -MM     LTG 2 Progress  Ongoing  -MM     LTG 3  Client zoë report neck disability score < 20%.  -MM     LTG 3 Progress  Goal Revised  -MM        Time Calculation    PT Goal Re-Cert Due Date  08/19/19  -MM       User Key  (r) = Recorded By, (t) = Taken By, (c) = Cosigned By    Initials Name Provider Type    MM Jerad Oh, PT Physical Therapist        Outcome Measure Options: Neck Disability Index (NDI)  Neck Disability Index  Section 1 - Pain Intensity: The pain is moderate at the moment.  Section 2 - Personal Care: I can look after myself normally, but it causes extra pain.  Section 3 - Lifting: I can lift heavy weights, but it gives me extra pain.  Section 4 - Work: I can only do my usual work, but no more  Section 5 - Headaches: I have slight headaches that come infrequently.  Section 6 - Concentration: I can concentrate fully with slight difficulty.  Section 7 - Sleeping: My sleep is mildly disturbed for up to 1-2 hours.  Section 8 - Driving: I can drive as long as I want with slight neck pain.  Section 9 - Reading: I can read as much as I want with moderate neck pain.  Section 10 - Recreation: I have some neck pain with all recreational activities.  Neck Disability Index Score: 13  Neck Disability Index Comments: 26%      Time Calculation:   Start Time: 1100  Therapy Charges for Today     Code Description Service Date Service Provider Modifiers Qty    29848294570 HC PT THER PROC EA 15 MIN 6/24/2019 Jerad Oh, PT GP 1    65736733331 HC PT MANUAL THERAPY EA 15 MIN 6/24/2019 Jerad Oh, PT GP 1          PT G-Codes  Outcome Measure Options: Neck Disability Index (NDI)  Neck Disability  Index Score: 13         Jerad Oh, PT  6/24/2019

## 2019-06-26 ENCOUNTER — TELEPHONE (OUTPATIENT)
Dept: INTERNAL MEDICINE | Facility: CLINIC | Age: 34
End: 2019-06-26

## 2019-06-26 NOTE — TELEPHONE ENCOUNTER
He called back and his questions are he is concerned about taking the medicine and loosing his sexual drive?  He states he has been reading up on the medicine.  And he is concerned about loosing his drive to workout??    He states it is ok to lvm

## 2019-06-26 NOTE — TELEPHONE ENCOUNTER
PATIENT WOULD LIKE TO SPEAK TO NEVILLE REGARDING THE CYMBALTA   -HE HAS SOME QUESTIONS ABOUT IT  PATIENTS NUMBER [phone]

## 2019-06-28 DIAGNOSIS — M54.6 CHRONIC MIDLINE THORACIC BACK PAIN: ICD-10-CM

## 2019-06-28 DIAGNOSIS — M54.2 NECK PAIN: Primary | ICD-10-CM

## 2019-06-28 DIAGNOSIS — G89.29 CHRONIC MIDLINE THORACIC BACK PAIN: ICD-10-CM

## 2019-06-28 DIAGNOSIS — M25.511 CHRONIC RIGHT SHOULDER PAIN: ICD-10-CM

## 2019-06-28 DIAGNOSIS — G89.29 CHRONIC RIGHT SHOULDER PAIN: ICD-10-CM

## 2019-06-28 NOTE — TELEPHONE ENCOUNTER
Return call to pt.  Yes the cymbalta drug class can have side effects as he reports.  If he does not want to continue the cymbalta, he needs to take the medicine every other day for a week then dc.  We have tried nsaids, muscle relaxer, exercises etc.  I would recommend he see an orthopedic specialist regarding his neck back shoulder pain.  Someone will call him to schedule.

## 2019-07-05 ENCOUNTER — HOSPITAL ENCOUNTER (OUTPATIENT)
Dept: PHYSICAL THERAPY | Facility: HOSPITAL | Age: 34
Setting detail: THERAPIES SERIES
Discharge: HOME | End: 2019-07-05

## 2019-07-05 DIAGNOSIS — M54.9 UPPER BACK PAIN: Primary | ICD-10-CM

## 2019-07-05 PROCEDURE — 97110 THERAPEUTIC EXERCISES: CPT

## 2019-07-05 PROCEDURE — 97140 MANUAL THERAPY 1/> REGIONS: CPT

## 2019-07-05 NOTE — THERAPY TREATMENT NOTE
Outpatient Physical Therapy Ortho Treatment Note  [facility]     Patient Name: Sunny Drummond  : 1985  MRN: [id number]  Today's Date: 2019      Visit Date: 2019    Visit Dx:    ICD-10-CM ICD-9-CM   1. Upper back pain M54.9 724.5       There is no problem list on file for this patient.       Past Medical History:   Diagnosis Date   • Chronic back pain      No past surgical history on file.    PT Assessment/Plan     Row Name 19 1445          PT Assessment    Assessment Comments  Pt continues to notice moderate neck/upper back pain, but responded well to TDN intervention.  TrPs most notably pronounced in R upper traps.  Updated HEP to improve upper thoracic mobility exercises and printout provided.   -AC        PT Plan    PT Plan Comments  Continue TDN treatments and incorporate manual therapy to assist with mobility exercises.   -AC       User Key  (r) = Recorded By, (t) = Taken By, (c) = Cosigned By    Initials Name Provider Type    Kayleigh Page, PT Physical Therapist        Exercises     Row Name 19 1445             Subjective Comments    Subjective Comments  Client reports TDN has been helping but only short-term. Neck/upper back feel constantly stiff even with exercise.    -AC         Subjective Pain    Able to rate subjective pain?  yes  -AC      Pre-Treatment Pain Level  5  -AC      Post-Treatment Pain Level  4  -AC         Total Minutes    42540 - PT Therapeutic Exercise Minutes  18  -AC      56201 - PT Manual Therapy Minutes  15  -AC         Exercise 1    Exercise Name 1  Provided updated HEP including: plank to downward dog (and modified to table thoracic extension w/ PPT), sidelying upper trunk rotation, and quadruped thoracic rotation.  -AC      Cueing 1  Verbal  -AC      Time 1  18  -AC      Additional Comments  ther ex  -AC        User Key  (r) = Recorded By, (t) = Taken By, (c) = Cosigned By    Initials Name Provider Type    Kayleigh Page, PT Physical  Therapist           Manual Rx (last 36 hours)      Manual Treatments     Row Name 07/05/19 1445             Total Minutes    21182 - PT Manual Therapy Minutes  15  -AC         Manual Rx 1    Manual Rx 1 Location  Soft tissue mobilization including strumming and sustained pressure to upper traps/levator  -AC      Manual Rx 1 Duration  10  -AC         Manual Rx 2    Manual Rx 2 Location  Thoracic spine  -AC      Manual Rx 2 Type  PA mobilizations throughout upper thoracic spine  -AC      Manual Rx 2 Duration  5  -AC         Manual Rx 3    Manual Rx 3 Location  Cervical  -AC      Manual Rx 3 Type  TDN to upper traps (4 insertions each) and multifidi (C5-C7) sustained for 10 mins  -AC      Manual Rx 3 Duration  10 Not included in billed time  -AC        User Key  (r) = Recorded By, (t) = Taken By, (c) = Cosigned By    Initials Name Provider Type    AC Kayleigh Boss, PT Physical Therapist        Time Calculation:   Start Time: 1445  Therapy Charges for Today     Code Description Service Date Service Provider Modifiers Qty    39756351364  PT THER PROC EA 15 MIN 7/5/2019 Kayleigh Boss, PT GP 1    [id number]  PT MANUAL THERAPY EA 15 MIN 7/5/2019 Kayleigh Boss, PT GP 1        Kayleigh Boss PT  7/5/2019

## 2019-07-22 ENCOUNTER — HOSPITAL ENCOUNTER (OUTPATIENT)
Dept: PHYSICAL THERAPY | Facility: HOSPITAL | Age: 34
Setting detail: THERAPIES SERIES
Discharge: HOME | End: 2019-07-22

## 2019-07-22 DIAGNOSIS — M54.9 UPPER BACK PAIN: Primary | ICD-10-CM

## 2019-07-22 PROCEDURE — 97110 THERAPEUTIC EXERCISES: CPT

## 2019-07-22 PROCEDURE — 97140 MANUAL THERAPY 1/> REGIONS: CPT

## 2019-07-22 NOTE — THERAPY DISCHARGE NOTE
Outpatient Physical Therapy Ortho Progress Note/Discharge Summary  [facility]     Patient Name: Sunny Drummond  : 1985  MRN: [id number]  Today's Date: 2019      Visit Date: 2019    Visit Dx:    ICD-10-CM ICD-9-CM   1. Upper back pain M54.9 724.5       There is no problem list on file for this patient.       Past Medical History:   Diagnosis Date   • Chronic back pain         No past surgical history on file.    PT Ortho     Row Name 19 1340       Cervical/Shoulder ROM Screen    Cervical flexion  Normal 48 deg  -AC    Cervical extension  Normal 48 deg  -AC    Cervical lateral flexion  Impaired 35 L with pain bilaterally, 37 R  -AC    Cervical rotation  Impaired 45 R, 41 L  -AC       Cervical Palpation    Levator Scapula  Right:;Tender;Guarded/taut  -AC    Upper Traps  Right:;Guarded/taut;Tender  -AC       General ROM    Head/Neck/Trunk  Trunk Lt Rotation;Trunk Rt Rotation  -AC       Head/Neck/Trunk    Trunk Lt Rotation AROM  Upper moderately limited with pain, lower minimally limited  -AC    Trunk Rt Rotation AROM  Upper moderately limited (greater than L) with pain, lower minimally limited  -AC      User Key  (r) = Recorded By, (t) = Taken By, (c) = Cosigned By    Initials Name Provider Type    AC Kayleigh Boss, PT Physical Therapist        Exercises     Row Name 19 5698             Subjective Comments    Subjective Comments  Pt states he underwent cupping at work and found immediate, short-term relief. Still having persistent R-sided neck pain and upper-mid back pain that is a dull, constant ache.  Aggravated by box lifts at work. States muscle relaxers, pain medications, and muscle rubs don't provide relief.  -AC         Subjective Pain    Able to rate subjective pain?  yes  -AC      Pre-Treatment Pain Level  5  -AC      Post-Treatment Pain Level  4  -AC         Total Minutes    67211 - PT Therapeutic Exercise Minutes  25  -AC      95468 - PT Manual Therapy Minutes  13  -AC          Exercise 1    Exercise Name 1  Reassessment measures included in therex time.  -AC      Cueing 1  Verbal;Demo  -AC      Time 1  25  -AC      Additional Comments  Ther Ex  -AC        User Key  (r) = Recorded By, (t) = Taken By, (c) = Cosigned By    Initials Name Provider Type    AC Kayleigh Boss, PT Physical Therapist           Manual Rx (last 36 hours)      Manual Treatments     Row Name 07/22/19 1340             Total Minutes    27228 - PT Manual Therapy Minutes  13  -AC         Manual Rx 1    Manual Rx 1 Location  Cervical region  -AC      Manual Rx 1 Type  Soft tissue mobilization including strumming and sustained pressure to upper traps/levator  -AC      Manual Rx 1 Duration  13  -AC         Manual Rx 3    Manual Rx 3 Location  Cervical  -AC      Manual Rx 3 Type  TDN to upper traps (5-6 insertions each) and multifidi (C5-C7) sustained for 10 mins  -AC      Manual Rx 3 Duration  20 Not included in billed time  -AC        User Key  (r) = Recorded By, (t) = Taken By, (c) = Cosigned By    Initials Name Provider Type    Kayleigh Page, PT Physical Therapist        PT OP Goals     Row Name 07/22/19 1340          PT Short Term Goals    STG Date to Achieve  06/18/19  -AC     STG 1  Client will demonstrate independence with initial HEP  -AC     STG 1 Progress  Met  -AC     STG 2  Client will report pain at worst < 5/10  -AC     STG 2 Progress  Not Met 8/10  -AC     STG 3  Client will complete work day with pain < 4/10  -AC     STG 3 Progress  Not Met 7/10  -AC        Long Term Goals    LTG Date to Achieve  07/16/19  -AC     LTG 1  Client will be independent with HEP  -AC     LTG 1 Progress  Met  -AC     LTG 2  Client will complete work day with Pain < 2/10  -AC     LTG 2 Progress  Not Met 7/10  -AC     LTG 3  Client zoë report neck disability score < 20%.  -AC     LTG 3 Progress  Not Met 30%  -AC        Time Calculation    PT Goal Re-Cert Due Date  08/19/19  -       User Key  (r) = Recorded By,  (t) = Taken By, (c) = Cosigned By    Initials Name Provider Type    AC Kayleigh Boss, PT Physical Therapist        Outcome Measure Options: Neck Disability Index (NDI)  Neck Disability Index  Section 1 - Pain Intensity: The pain is fairly severe at the moment.  Section 2 - Personal Care: I can look after myself normally, but it causes extra pain.  Section 3 - Lifting: I can lift heavy weights, but it gives me extra pain.  Section 4 - Work: I can do as much work as I want.  Section 5 - Headaches: I have slight headaches that come infrequently.  Section 6 - Concentration: I can concentrate fully with slight difficulty.  Section 7 - Sleeping: My sleep is moderately disturbed for up to 2-3 hours.  Section 8 - Driving: I can drive as long as I want with moderate neck pain.  Section 9 - Reading: I can read as much as I want with moderate neck pain.  Section 10 - Recreation: I have some neck pain with all recreational activities.  Neck Disability Index Score: 15  Neck Disability Index Comments: 30%    Time Calculation:   Start Time: 1340  Therapy Charges for Today     Code Description Service Date Service Provider Modifiers Qty    75171381594 HC PT THER PROC EA 15 MIN 7/22/2019 Kayleigh Boss, PT GP 2    98228961385 HC PT MANUAL THERAPY EA 15 MIN 7/22/2019 Kayleigh Boss, PT GP 1        PT G-Codes  Outcome Measure Options: Neck Disability Index (NDI)  Neck Disability Index Score: 15     OP PT Discharge Summary  Date of Discharge: 07/22/19  Reason for Discharge: Lack of progress  Outcomes Achieved: Patient able to partially acheive established goals  Discharge Destination: Home with home program  Discharge Instructions/Additional Comments: Pt was seen for 7 PT sessions to address neck/upper thoracic pain.  Pt appeared for 7/12 scheduled appointments. Pt found temporary relief with TDN intervention and reports compliance with HEP/gym routine modifications, however still has moderate-severe pain.  Discussed  potential of purchasing cupping tools for self-management of symptoms and other self-pay options for TDN outside of our facility.  Pt will be discharged at this time due to lack of progress.     Kayleigh Boss, PT  7/22/2019

## 2019-09-06 ENCOUNTER — TELEPHONE (OUTPATIENT)
Dept: INTERNAL MEDICINE | Facility: CLINIC | Age: 34
End: 2019-09-06

## 2019-09-06 NOTE — TELEPHONE ENCOUNTER
Pt missed his ortho appt and when he tried to reschedule it they told him 2 months out  They told him to call here and see if we could refer him to someone else    Please call pt [phone]

## 2019-10-29 ENCOUNTER — DOCUMENTATION (OUTPATIENT)
Dept: INTERNAL MEDICINE | Facility: CLINIC | Age: 34
End: 2019-10-29

## 2019-10-29 NOTE — PROGRESS NOTES
Roderick Ovalles Kimberly M, APRN; Jasmin Goodwin, MA   Phone Number: [phone]             CALLING FOR A MUSCLE RUB WITH LIDOCAINE(WANTS THE STRONGER ONE) TO [facility] IN [place] PLEASE CALL [phone]      I [facility] staff message.   Sent RX to pharmacy.

## 2020-02-19 ENCOUNTER — OFFICE VISIT (OUTPATIENT)
Dept: INTERNAL MEDICINE | Facility: CLINIC | Age: 35
End: 2020-02-19

## 2020-02-19 VITALS
BODY MASS INDEX: 27.28 KG/M2 | WEIGHT: 180 LBS | DIASTOLIC BLOOD PRESSURE: 84 MMHG | OXYGEN SATURATION: 99 % | SYSTOLIC BLOOD PRESSURE: 144 MMHG | HEIGHT: 68 IN | RESPIRATION RATE: 16 BRPM | HEART RATE: 105 BPM

## 2020-02-19 DIAGNOSIS — G47.00 INSOMNIA, UNSPECIFIED TYPE: ICD-10-CM

## 2020-02-19 DIAGNOSIS — R07.89 RIGHT-SIDED CHEST WALL PAIN: Primary | ICD-10-CM

## 2020-02-19 PROCEDURE — 99213 OFFICE O/P EST LOW 20 MIN: CPT | Performed by: NURSE PRACTITIONER

## 2020-02-19 RX ORDER — METAXALONE 800 MG/1
800 TABLET ORAL 3 TIMES DAILY PRN
Qty: 30 TABLET | Refills: 0 | Status: SHIPPED | OUTPATIENT
Start: 2020-02-19 | End: 2020-04-09 | Stop reason: SDUPTHER

## 2020-02-19 RX ORDER — AMITRIPTYLINE HYDROCHLORIDE 10 MG/1
10 TABLET, FILM COATED ORAL NIGHTLY PRN
Qty: 30 TABLET | Refills: 1 | Status: SHIPPED | OUTPATIENT
Start: 2020-02-19 | End: 2020-03-02

## 2020-02-19 NOTE — PROGRESS NOTES
Sunny Drummond is a 34 y.o. male who presents for right rib pain and insomnia.    Chief Complaint   Patient presents with   • Fall   • Rib Pain       Fall   The accident occurred 3 to 5 days ago. The fall occurred while walking. He fell from an unknown height. Impact surface: hard guardrail of porch. Point of impact: right chest. Pain location: right ribs/chest. The pain is at a severity of 4/10. The pain is moderate. The symptoms are aggravated by movement. Pertinent negatives include no abdominal pain, bowel incontinence, fever, hearing loss, loss of consciousness, nausea, numbness, tingling or vomiting. He has tried rest for the symptoms. The treatment provided mild relief.         Past Medical History:   Diagnosis Date   • Chronic back pain        History reviewed. No pertinent surgical history.    Family History   Problem Relation Age of Onset   • Diabetes Mother    • Heart disease Maternal Grandfather    • Alcohol abuse Maternal Grandfather    • Cancer Father    • Alcohol abuse Father        Social History     Socioeconomic History   • Marital status: Unknown     Spouse name: Not on file   • Number of children: Not on file   • Years of education: Not on file   • Highest education level: Not on file   Tobacco Use   • Smoking status: Former Smoker     Types: Cigarettes   • Smokeless tobacco: Current User   Substance and Sexual Activity   • Alcohol use: Yes     Comment: Quit 3 days ago.  Sometims would drink 5-10 drinks liquor or beer.   • Drug use: No   • Sexual activity: Defer       No Known Allergies    ROS    Review of Systems   Constitutional: Negative for chills and fever.   Eyes: Negative for blurred vision and visual disturbance.   Respiratory: Negative for cough.    Cardiovascular: Negative for chest pain, palpitations and leg swelling.   Gastrointestinal: Negative for abdominal pain, bowel incontinence, nausea and vomiting.   Musculoskeletal: Positive for arthralgias and myalgias.   Skin: Negative for  "rash.   Allergic/Immunologic: Negative for immunocompromised state.   Neurological: Negative for tingling, loss of consciousness, numbness and headache.   Hematological: Negative for adenopathy.   Psychiatric/Behavioral: Positive for sleep disturbance.       Vitals:    02/19/20 0841   BP: 144/84   Pulse: 105   Resp: 16   SpO2: 99%   Weight: 81.6 kg (180 lb)   Height: 172.7 cm (68\")   PainSc:   8         Current Outpatient Medications:   •  amitriptyline (ELAVIL) 10 MG tablet, Take 1 tablet by mouth At Night As Needed for Sleep., Disp: 30 tablet, Rfl: 1  •  metaxalone (SKELAXIN) 800 MG tablet, Take 1 tablet by mouth 3 (Three) Times a Day As Needed for Muscle Spasms., Disp: 30 tablet, Rfl: 0    PE    Physical Exam   Constitutional: He is oriented to person, place, and time. He appears well-developed and well-nourished. No distress.   HENT:   Head: Normocephalic and atraumatic.   Eyes: Pupils are equal, round, and reactive to light. EOM are normal.   Neck: Normal range of motion. Neck supple.   Cardiovascular: Normal rate, regular rhythm and normal heart sounds. Exam reveals no gallop and no friction rub.   No murmur heard.  Pulmonary/Chest: Effort normal and breath sounds normal.   Musculoskeletal: Normal range of motion. He exhibits tenderness.        Arms:  Right chest wall tenderness on palpation. No bruising.   Lymphadenopathy:     He has no cervical adenopathy.   Neurological: He is alert and oriented to person, place, and time.   Skin: Skin is warm. Capillary refill takes less than 2 seconds. No rash noted. He is not diaphoretic.   Psychiatric: He has a normal mood and affect. His behavior is normal. Judgment and thought content normal.   Nursing note and vitals reviewed.       A/P    Problem List Items Addressed This Visit     None      Visit Diagnoses     Right-sided chest wall pain    -  Primary    Skelaxin, ice and heat as needed. Tylenol and Ibuprofen for pain. F/U as needed.    Relevant Medications    " metaxalone (SKELAXIN) 800 MG tablet    Insomnia, unspecified type        Refilled Elavil since he had good results with this in the past.    Relevant Medications    amitriptyline (ELAVIL) 10 MG tablet          Assessment      ICD-10-CM ICD-9-CM   1. Right-sided chest wall pain R07.89 786.52   2. Insomnia, unspecified type G47.00 780.52            Problems Addressed this Visit     None      Visit Diagnoses     Right-sided chest wall pain    -  Primary    Skelaxin, ice and heat as needed. Tylenol and Ibuprofen for pain. F/U as needed.    Relevant Medications    metaxalone (SKELAXIN) 800 MG tablet    Insomnia, unspecified type        Refilled Elavil since he had good results with this in the past.    Relevant Medications    amitriptyline (ELAVIL) 10 MG tablet          Plan    No orders of the defined types were placed in this encounter.    New Medications Ordered This Visit   Medications   • amitriptyline (ELAVIL) 10 MG tablet     Sig: Take 1 tablet by mouth At Night As Needed for Sleep.     Dispense:  30 tablet     Refill:  1   • metaxalone (SKELAXIN) 800 MG tablet     Sig: Take 1 tablet by mouth 3 (Three) Times a Day As Needed for Muscle Spasms.     Dispense:  30 tablet     Refill:  0                 Plan of care reviewed with patient at the conclusion of today's visit. Education was provided regarding diagnosis, management and any prescribed or recommended OTC medications.  Patient verbalizes understanding of and agreement with management plan.    Return if symptoms worsen or fail to improve.     YEE Ott

## 2020-02-19 NOTE — PATIENT INSTRUCTIONS
Chest Wall Pain  Chest wall pain is pain in or around the bones and muscles of your chest. Chest wall pain may be caused by:  · An injury.  · Coughing a lot.  · Using your chest and arm muscles too much.  Sometimes, the cause may not be known. This pain may take a few weeks or longer to get better.  Follow these instructions at home:  Managing pain, stiffness, and swelling  If told, put ice on the painful area:  · Put ice in a plastic bag.  · Place a towel between your skin and the bag.  · Leave the ice on for 20 minutes, 2-3 times a day.    Activity  · Rest as told by your doctor.  · Avoid doing things that cause pain. This includes lifting heavy items.  · Ask your doctor what activities are safe for you.  General instructions    · Take over-the-counter and prescription medicines only as told by your doctor.  · Do not use any products that contain nicotine or tobacco, such as cigarettes, e-cigarettes, and chewing tobacco. If you need help quitting, ask your doctor.  · Keep all follow-up visits as told by your doctor. This is important.  Contact a doctor if:  · You have a fever.  · Your chest pain gets worse.  · You have new symptoms.  Get help right away if:  · You feel sick to your stomach (nauseous) or you throw up (vomit).  · You feel sweaty or light-headed.  · You have a cough with mucus from your lungs (sputum) or you cough up blood.  · You are short of breath.  These symptoms may be an emergency. Do not wait to see if the symptoms will go away. Get medical help right away. Call your local emergency services (911 in the [place] Do not drive yourself to the hospital.  Summary  · Chest wall pain is pain in or around the bones and muscles of your chest.  · It may be treated with ice, rest, and medicines. Your condition may also get better if you avoid doing things that cause pain.  · Contact a doctor if you have a fever, chest pain that gets worse, or new symptoms.  · Get help right away if you feel light-headed  or you get short of breath. These symptoms may be an emergency.  This information is not intended to replace advice given to you by your health care provider. Make sure you discuss any questions you have with your health care provider.  Document Released: 06/05/2009 Document Revised: 06/20/2019 Document Reviewed: 06/20/2019  [employer] Interactive Patient Education © 2020 [employer]    How to Use Cold Therapy  Cold therapy, or cryotherapy, is a treatment that uses cold temperatures to treat an injury or medical condition. It includes using cold packs or ice packs to reduce pain and swelling. Only use cold therapy if your doctor says it is okay.  What are the risks?  Generally, cold therapy is a safe treatment. However, it is not safe for:  · People who are not able to say they are in pain. These include small children and people who have memory problems.  · People who have certain conditions, such as:  ? A problem in the vessels that slows blood flow to the fingers and toes (Raynaud's syndrome).  ? Feeling very cold easily (cold hypersensitivity).  ? Lack of feeling in the area being iced.  Cold therapy may not be safe for people who have other conditions. Do not use it without talking to your doctor if you have:  · A heart condition.  · High blood pressure.  · Open or healing wounds.  · An infection.  · Pain and swelling in your joints (rheumatoid arthritis).  · Poor blood flow in the body.  · Diabetes.  · Certain skin conditions.  How can I make a cold pack?  When using a cold pack at home to reduce pain and swelling, you can use:  · A silica gel cold pack that has been left in the freezer. You can buy this online or in stores.  · A sealable plastic bag that has been filled with crushed ice.  · A washcloth or paper towels soaked in cold (or ice) water.  · A plastic bag of frozen vegetables. Throw them away when you are finished using them as a cold pack.  Supplies needed:  · A cold pack.  · A towel. This can be  dry or damp, based on what you like.  How to use cold therapy    1. Have your cold pack ready.  2. Place a towel between the cold pack and your skin. You may also wrap the cold pack in a towel.  3. Put the cold pack on the affected area. Keep it on for no more than 20 minutes at a time.  4. Check your skin after 5 minutes to make sure that there is no damage to the area. Check for:  ? White spots on your skin. Your skin may look blotchy or mottled.  ? Skin that looks blue or pale.  ? Skin that feels waxy or hard.  5. Repeat these steps as many times each day as told by your doctor.  Always use a towel to avoid direct contact with your skin.  Contact a doctor if:  · You start to have white spots on your skin. This may give your skin a blotchy or mottled look.  · Your skin turns blue or pale.  · Your skin becomes waxy or hard.  · Your swelling gets worse.  Summary  · Cold therapy, or cryotherapy, is used to treat an injury or other conditions. It includes using cold packs or ice packs to reduce pain and swelling.  · Cold therapy is not safe for people who are not able to say they are in pain.  · When using cold packs or ice packs, always place a towel between the cold source and your skin.  · Check your skin after 5 minutes of icing it. This is to make sure that there is no skin damage.  · Contact your doctor if you notice changes in your skin or your swelling gets worse.  This information is not intended to replace advice given to you by your health care provider. Make sure you discuss any questions you have with your health care provider.  Document Released: 06/05/2009 Document Revised: 09/16/2019 Document Reviewed: 09/16/2019  [employer] Interactive Patient Education © 2020 [employer]

## 2020-02-28 ENCOUNTER — TELEPHONE (OUTPATIENT)
Dept: INTERNAL MEDICINE | Facility: CLINIC | Age: 35
End: 2020-02-28

## 2020-02-28 NOTE — TELEPHONE ENCOUNTER
PHARMACY CALLED STATING THAT PT WOULD LIKE  FINASTERIDE 1MG PRESCRIBED.        PHARMACY CONFIRMED - GEORGINA GARCIA      PLEASE ADVISE  PT

## 2020-02-28 NOTE — TELEPHONE ENCOUNTER
PT WAS TOLD HE COULD CALL BACK TO GET HIS MEDS CHANGED. HE WOULD LIKE TO CHANGE HIS SLEEP MEDS AND HE WAS TOLD THAT THERE IS A DIFFERENT MUSCLE CREAM THAT HE COULD SWITCH TO. PT WOULD LIKE TO SWITCH TO THAT AS WELL. PLEASE CALL PT TO ADVISE.

## 2020-02-28 NOTE — TELEPHONE ENCOUNTER
I have not seen pt since May 2019.  [facility] did see [facility] recently for sick visit.  I don't know if these med questions are things she discussed.  If so, ask her if she would like to address.  If not, then pt will need a FU appt with me so I can assist him with these meds.

## 2020-03-02 ENCOUNTER — OFFICE VISIT (OUTPATIENT)
Dept: INTERNAL MEDICINE | Facility: CLINIC | Age: 35
End: 2020-03-02

## 2020-03-02 VITALS
BODY MASS INDEX: 28.49 KG/M2 | DIASTOLIC BLOOD PRESSURE: 80 MMHG | WEIGHT: 188 LBS | SYSTOLIC BLOOD PRESSURE: 142 MMHG | HEIGHT: 68 IN | HEART RATE: 98 BPM | OXYGEN SATURATION: 99 %

## 2020-03-02 DIAGNOSIS — G47.00 INSOMNIA, UNSPECIFIED TYPE: ICD-10-CM

## 2020-03-02 DIAGNOSIS — L64.9 MALE PATTERN BALDNESS: ICD-10-CM

## 2020-03-02 DIAGNOSIS — R07.81 RIB PAIN ON RIGHT SIDE: Primary | ICD-10-CM

## 2020-03-02 PROCEDURE — 99213 OFFICE O/P EST LOW 20 MIN: CPT | Performed by: NURSE PRACTITIONER

## 2020-03-02 RX ORDER — FINASTERIDE 1 MG/1
1 TABLET, FILM COATED ORAL DAILY
Qty: 30 TABLET | Refills: 1 | Status: SHIPPED | OUTPATIENT
Start: 2020-03-02 | End: 2020-07-02

## 2020-03-02 RX ORDER — TRAZODONE HYDROCHLORIDE 50 MG/1
50 TABLET ORAL NIGHTLY PRN
Qty: 30 TABLET | Refills: 2 | Status: SHIPPED | OUTPATIENT
Start: 2020-03-02 | End: 2020-04-09 | Stop reason: SDUPTHER

## 2020-03-02 NOTE — PROGRESS NOTES
Chief Complaint   Patient presents with   • Rib Injury     FOLLOW UP FROM 02/19/20 (rib pain)   • Insomnia       History of Present Illness  34 y.o.male presents for insomnia, rib injury, male pattern baldness    Rib Injury   The current episode started 1 to 4 weeks ago. The problem occurs 2 to 4 times per day. The problem has been gradually improving (Pt fell and hit lower ribs on hand rale of porch). Associated symptoms include arthralgias. Pertinent negatives include no abdominal pain, change in bowel habit, chest pain, chills, coughing, diaphoresis, fatigue, fever, nausea or vomiting. Exacerbated by: sitting up from bed, working out. He has tried NSAIDs and ice (muscle rub, muscle relaxers) for the symptoms. The treatment provided moderate relief.   Insomnia   This is a chronic problem. The current episode started more than 1 year ago. The problem occurs constantly. The problem has been unchanged. Associated symptoms include arthralgias. Pertinent negatives include no abdominal pain, change in bowel habit, chest pain, chills, coughing, diaphoresis, fatigue, fever, nausea or vomiting. Treatments tried: Elavil helped him go to sleep, but doesn't stay asleep. Has taken melatonin in the past but doesn't seem to help anymore.     Hair loss   Previously took finasteride 1 mg for hair loss. He was beginning to get some male pattern baldness and thinning of his hair so he went to an online dr to get the Rx. He has been taking finasteride since last July and would like a refill. As soon as he stopped taking the medication he hair started falling out again. Under a lot of stress and feels like it that makes it worse.     Review of Systems   Constitutional: Negative for chills, diaphoresis, fatigue and fever.   Eyes: Negative for blurred vision and visual disturbance.   Respiratory: Negative for cough, chest tightness and shortness of breath.    Cardiovascular: Negative for chest pain, palpitations and leg swelling.      Gastrointestinal: Negative for abdominal pain, change in bowel habit, constipation, diarrhea, nausea and vomiting.   Genitourinary: Negative for dysuria, flank pain, nocturia and urgency.   Musculoskeletal: Positive for arthralgias.        Right sided rib pain   Skin:        Hair loss   Neurological: Negative for headache.   Psychiatric/Behavioral: Positive for stress. Negative for depressed mood. The patient has insomnia. The patient is not nervous/anxious.          [facility]  The following portions of the patient's history were reviewed and updated as appropriate: allergies, current medications, past family history, past medical history, past social history, past surgical history and problem list.     Past Medical History:   Diagnosis Date   • Chronic back pain       History reviewed. No pertinent surgical history.   No Known Allergies   Social History     Socioeconomic History   • Marital status: Unknown     Spouse name: Not on file   • Number of children: Not on file   • Years of education: Not on file   • Highest education level: Not on file   Tobacco Use   • Smoking status: Former Smoker     Types: Cigarettes     Last attempt to quit: 3/2/2018     Years since quittin.0   • Smokeless tobacco: Current User   • Tobacco comment: just when drinking   Substance and Sexual Activity   • Alcohol use: Yes     Comment: Quit 3 days ago.  Sometims would drink 5-10 drinks liquor or beer.   • Drug use: No   • Sexual activity: Defer     Family History   Problem Relation Age of Onset   • Diabetes Mother    • Heart disease Maternal Grandfather    • Alcohol abuse Maternal Grandfather    • Cancer Father    • Alcohol abuse Father             Current Outpatient Medications:   •  amitriptyline (ELAVIL) 10 MG tablet, Take 1 tablet by mouth At Night As Needed for Sleep., Disp: 30 tablet, Rfl: 1  •  metaxalone (SKELAXIN) 800 MG tablet, Take 1 tablet by mouth 3 (Three) Times a Day As Needed for Muscle Spasms., Disp: 30 tablet, Rfl:  "0    VITALS:  /80   Pulse 98   Ht 172.7 cm (68\")   Wt 85.3 kg (188 lb)   SpO2 99%   BMI 28.59 kg/m²     Physical Exam   Constitutional: He is oriented to person, place, and time. He appears well-developed and well-nourished. No distress.   HENT:   Head: Normocephalic. Hair is normal.   Mouth/Throat: Oropharynx is clear and moist.   Eyes: Pupils are equal, round, and reactive to light. Right eye exhibits no discharge. Left eye exhibits no discharge.   Neck: Normal range of motion. Neck supple. No thyromegaly present.   Cardiovascular: Normal rate, regular rhythm, normal heart sounds and intact distal pulses.   No edema   Pulmonary/Chest: Effort normal and breath sounds normal. No respiratory distress.   Abdominal: Soft. Bowel sounds are normal. There is no tenderness.   Musculoskeletal:        Arms:  Lymphadenopathy:     He has no cervical adenopathy.   Neurological: He is alert and oriented to person, place, and time.   Skin: Skin is warm and dry. Capillary refill takes less than 2 seconds. No rash noted.   Psychiatric: He has a normal mood and affect. His behavior is normal.   Vitals reviewed.      LABS  No new labs    ASSESSMENT/PLAN  Sunny was seen today for rib injury and insomnia.    Diagnoses and all orders for this visit:    Rib pain on right side  Comments:  after a fall; improving. will try flector patch.  Orders:  -     diclofenac (FLECTOR) 1.3 % patch patch; Apply 1 patch topically to the appropriate area as directed 2 (Two) Times a Day As Needed (rib pain).    Insomnia, unspecified type  Comments:  Practice good sleep hygeine (going to bed same time, limiting caffeine and heavy meals prior to bed, avoid naps)  Orders:  -     traZODone (DESYREL) 50 MG tablet; Take 1 tablet by mouth At Night As Needed for Sleep.    Male pattern baldness  -     finasteride (PROPECIA) 1 MG tablet; Take 1 tablet by mouth Daily.        I discussed the patients findings and my recommendations with patient.  Patient " was encouraged to keep me informed of any acute changes, lack of improvement, or any new concerning symptoms.  Patient voiced understanding of all instructions and denied further questions.      FOLLOW-UP  Return if symptoms worsen or fail to improve.    Electronically signed by:    YEE Hardy  03/02/2020    EMR Dragon/Transcription Disclaimer:  Much of this encounter note is an electronic transcription/translation of spoken language to printed text.  The electronic translation of spoken language may permit erroneous, or at times, nonsensical words or phrases to be inadvertently transcribed.  Although I have reviewed the note for such errors, some may still exist

## 2020-04-08 ENCOUNTER — TELEPHONE (OUTPATIENT)
Dept: INTERNAL MEDICINE | Facility: CLINIC | Age: 35
End: 2020-04-08

## 2020-04-08 NOTE — TELEPHONE ENCOUNTER
Patient wanted to be seen for some sleep issues that he's been having. I scheduled him for 4/9 but let him know that it may be changed to a video visit or telephone visit depending on what Penelope would think was best.     You can reach him at [phone]

## 2020-04-09 ENCOUNTER — TELEMEDICINE (OUTPATIENT)
Dept: INTERNAL MEDICINE | Facility: CLINIC | Age: 35
End: 2020-04-09

## 2020-04-09 DIAGNOSIS — G47.00 INSOMNIA, UNSPECIFIED TYPE: ICD-10-CM

## 2020-04-09 PROCEDURE — 99212 OFFICE O/P EST SF 10 MIN: CPT | Performed by: NURSE PRACTITIONER

## 2020-04-09 RX ORDER — TRAZODONE HYDROCHLORIDE 50 MG/1
50 TABLET ORAL NIGHTLY PRN
Qty: 30 TABLET | Refills: 0 | Status: SHIPPED | OUTPATIENT
Start: 2020-04-09 | End: 2020-08-19 | Stop reason: SDUPTHER

## 2020-04-09 RX ORDER — METAXALONE 800 MG/1
800 TABLET ORAL 3 TIMES DAILY PRN
Qty: 30 TABLET | Refills: 0 | Status: SHIPPED | OUTPATIENT
Start: 2020-04-09 | End: 2020-07-21

## 2020-04-09 NOTE — PROGRESS NOTES
Chief Complaint   Patient presents with   • Insomnia       History of Present Illness  35 y.o.male presents for telephone visit for insomnia.  Got hurt  at work with finger injury.  Has not been able to work out exercise and feels like between no exercise and hand these are affecting sleep habits. Has difficulty falling asleep and staying asleep nightly. Onset about a week. Has not tried anything this time. Has had trouble with insomnia before and took trazadone and muscle relaxants; muscle relaxant worked better than trazadone.   Regarding hand injury he is being seen thru [facility]; referred to hand specialist. Had tetanus shot.      Review of Systems   Constitutional: Negative for chills and fever.   Musculoskeletal: Positive for arthralgias.   Psychiatric/Behavioral: Positive for sleep disturbance and stress. Negative for depressed mood.         [facility]  The following portions of the patient's history were reviewed and updated as appropriate: allergies, current medications, past family history, past medical history, past social history, past surgical history and problem list.     Past Medical History:   Diagnosis Date   • Chronic back pain       No past surgical history on file.   No Known Allergies   Social History     Socioeconomic History   • Marital status: Unknown     Spouse name: Not on file   • Number of children: Not on file   • Years of education: Not on file   • Highest education level: Not on file   Tobacco Use   • Smoking status: Former Smoker     Types: Cigarettes     Last attempt to quit: 3/2/2018     Years since quittin.1   • Smokeless tobacco: Current User   • Tobacco comment: just when drinking   Substance and Sexual Activity   • Alcohol use: Yes     Comment: Quit 3 days ago.  Sometims would drink 5-10 drinks liquor or beer.   • Drug use: No   • Sexual activity: Defer     Family History   Problem Relation Age of Onset   • Diabetes Mother    • Heart disease Maternal  Grandfather    • Alcohol abuse Maternal Grandfather    • Cancer Father    • Alcohol abuse Father             Current Outpatient Medications:   •  diclofenac (FLECTOR) 1.3 % patch patch, Apply 1 patch topically to the appropriate area as directed 2 (Two) Times a Day As Needed (rib pain)., Disp: 2 each, Rfl: 0  •  finasteride (PROPECIA) 1 MG tablet, Take 1 tablet by mouth Daily., Disp: 30 tablet, Rfl: 1  •  metaxalone (SKELAXIN) 800 MG tablet, Take 1 tablet by mouth 3 (Three) Times a Day As Needed for Muscle Spasms., Disp: 30 tablet, Rfl: 0  •  traZODone (DESYREL) 50 MG tablet, Take 1 tablet by mouth At Night As Needed for Sleep., Disp: 30 tablet, Rfl: 2    VITALS:  There were no vitals taken for this visit.    Physical Exam  None phone visit    LABS  none    ASSESSMENT/PLAN  Sunny was seen today for telephone visit for insomnia.    Diagnoses and all orders for this visit:    Insomnia, unspecified type  Comments:  Practice good sleep hygeine (going to bed same time, limiting caffeine and heavy meals prior to bed, avoid naps)  Orders:  -     metaxalone (Skelaxin) 800 MG tablet; Take 1 tablet by mouth 3 (Three) Times a Day As Needed for Muscle Spasms.  -     traZODone (DESYREL) 50 MG tablet; Take 1 tablet by mouth At Night As Needed for Sleep.    will start first with muscle relaxant; thinks has a few skelaxin left over. This may also help his hand symptoms.  If this does not help his insomnia, will try the trazadone again. Sent updated RX.    I discussed the patients findings and my recommendations with patient.  Patient was encouraged to keep me informed of any acute changes, lack of improvement, or any new concerning symptoms.  Patient voiced understanding of all instructions and denied further questions.    Total time for visit and medical discussion 12 minutes.    FOLLOW-UP  Return if symptoms worsen or fail to improve.    Electronically signed by:    YEE Hardy  04/09/2020    EMR Dragon/Transcription  Disclaimer:  Much of this encounter note is an electronic transcription/translation of spoken language to printed text.  The electronic translation of spoken language may permit erroneous, or at times, nonsensical words or phrases to be inadvertently transcribed.  Although I have reviewed the note for such errors, some may still exist

## 2020-05-05 ENCOUNTER — TELEMEDICINE (OUTPATIENT)
Dept: INTERNAL MEDICINE | Facility: CLINIC | Age: 35
End: 2020-05-05

## 2020-05-05 DIAGNOSIS — M79.641 PAIN OF RIGHT HAND: Primary | ICD-10-CM

## 2020-05-05 PROCEDURE — 99213 OFFICE O/P EST LOW 20 MIN: CPT | Performed by: NURSE PRACTITIONER

## 2020-05-05 NOTE — PROGRESS NOTES
Chief Complaint   Patient presents with   • Hand Pain     finger pain       History of Present Illness  35 y.o.male presents for video visit today hand pain finger pain onset 2020.  Pt was not able to get video portion working; only audio.  Verbal consent obtained for telemedicine visit.  Had hurt his fingers 2nd and 5th digits with a work related injury. Seen thru [facility] and Dr. Mitchell hand specialist. Pt reports has shards of metal in fingers and needs surgery, but had to be delayed due to covid.  His fingers remain uncomfortable and swollen.  Wants to rtw but the meds he takes to help his hand pain and sleep problem due to hand pain make him feel jittery at work and work has let him go.  Voices frustration at loss of job; can't work out or exercise with reported loss of  with hand. Has been taking skelaxin and trazadone which help sleep and hand pain.  Need note where seen thru pcp office today.    Review of Systems   Constitutional: Negative for chills and fever.   Musculoskeletal: Positive for arthralgias and joint swelling.       [facility]  The following portions of the patient's history were reviewed and updated as appropriate: allergies, current medications, past family history, past medical history, past social history, past surgical history and problem list.     Past Medical History:   Diagnosis Date   • Chronic back pain       No past surgical history on file.   No Known Allergies   Social History     Socioeconomic History   Tobacco Use   • Smoking status: Former Smoker     Packs/day: 0.00     Years: 0.00     Pack years: 0.00     Types: Cigarettes     Last attempt to quit: 3/2/2018     Years since quittin.1   • Smokeless tobacco: Current User   • Tobacco comment: just when drinking   Substance and Sexual Activity   • Alcohol use: Yes     Comment: Quit 3 days ago.  Sometims would drink 5-10 drinks liquor or beer.   • Drug use: No   • Sexual activity: Defer     Family History   Problem  Relation Age of Onset   • Diabetes Mother    • Heart disease Maternal Grandfather    • Alcohol abuse Maternal Grandfather    • Cancer Father    • Alcohol abuse Father             Current Outpatient Medications:   •  diclofenac (FLECTOR) 1.3 % patch patch, Apply 1 patch topically to the appropriate area as directed 2 (Two) Times a Day As Needed (rib pain)., Disp: 2 each, Rfl: 0  •  finasteride (PROPECIA) 1 MG tablet, Take 1 tablet by mouth Daily., Disp: 30 tablet, Rfl: 1  •  metaxalone (Skelaxin) 800 MG tablet, Take 1 tablet by mouth 3 (Three) Times a Day As Needed for Muscle Spasms., Disp: 30 tablet, Rfl: 0  •  traZODone (DESYREL) 50 MG tablet, Take 1 tablet by mouth At Night As Needed for Sleep., Disp: 30 tablet, Rfl: 0    VITALS:  Physical Exam  Not available  LABS  No new labs    ASSESSMENT/PLAN  Sunny was seen today for hand pain.    Diagnoses and all orders for this visit:    Pain of right hand    he does not want to change meds at this time, as working for him.  I directed him to contact Dr. Mitchell's office as outpt ortho surgeries should be starting to open up over next few days, so he can get back on hand specialist schedule.  He had workers comp questions, I offered occupational therapy referral and for specific wc questions, should contact state wc office.    I discussed the patients findings and my recommendations with patient.  Patient was encouraged to keep me informed of any acute changes, lack of improvement, or any new concerning symptoms.  Patient voiced understanding of all instructions and denied further questions.      FOLLOW-UP  Return if symptoms worsen or fail to improve.    Electronically signed by:    YEE Hardy  05/05/2020    EMR Dragon/Transcription Disclaimer:  Much of this encounter note is an electronic transcription/translation of spoken language to printed text.  The electronic translation of spoken language may permit erroneous, or at times, nonsensical words or  phrases to be inadvertently transcribed.  Although I have reviewed the note for such errors, some may still exist

## 2020-05-06 ENCOUNTER — TELEPHONE (OUTPATIENT)
Dept: INTERNAL MEDICINE | Facility: CLINIC | Age: 35
End: 2020-05-06

## 2020-05-06 NOTE — TELEPHONE ENCOUNTER
----- Message from YEE Turpin sent at 5/6/2020 10:22 AM EDT -----  We still need records related to hand/finger from [facility] and Dr. Mitchell please.  If we can request please and follow up if received.  Thanks.

## 2020-05-06 NOTE — TELEPHONE ENCOUNTER
Called pt as [facility] will not release information to us without him calling and signing a JOSY

## 2020-05-07 NOTE — PATIENT INSTRUCTIONS
Metaxalone tablets  What is this medicine?  METAXALONE (me TAX a lone) is a muscle relaxer. It is used to treat pain and stiffness in muscles caused by strains, sprains, or other injury.  This medicine may be used for other purposes; ask your health care provider or pharmacist if you have questions.  COMMON BRAND NAME(S): Metaxajessica Skelaxin  What should I tell my health care provider before I take this medicine?  They need to know if you have any of these conditions:  -anemia or blood disorder  -kidney disease  -liver disease  -an unusual or allergic reaction to metaxalone, other medicines, foods, dyes, or preservatives  -pregnant or trying to get pregnant  -breast-feeding  How should I use this medicine?  Take this medicine by mouth. Swallow it with a full glass of water. Follow the directions on the prescription label. Do not take more medicine than you are told to take.  Talk to your pediatrician regarding the use of this medicine in children. While this drug may be prescribed for children as young as 13 years of age for selected conditions, precautions do apply.  Overdosage: If you think you have taken too much of this medicine contact a poison control center or emergency room at once.  NOTE: This medicine is only for you. Do not share this medicine with others.  What if I miss a dose?  If you miss a dose, take it as soon as you can. If it is almost time for your next dose, take only that dose. Do not take double or extra doses.  What may interact with this medicine?  Do not take this medication with any of the following medicines:  -narcotic medicines for cough  This medicine may also interact with the following medications:  -alcohol  -antihistamines for allergy, cough and cold  -certain medicines for anxiety or sleep  -certain medicines for depression like amitriptyline, fluoxetine, sertraline  -certain medicines for seizures like phenobarbital, primidone  -general anesthetics like halothane, isoflurane,  methoxyflurane, propofol  -local anesthetics like lidocaine, pramoxine, tetracaine  -medicines that relax muscles for surgery  -narcotic medicines for pain  -phenothiazines like chlorpromazine, mesoridazine, prochlorperazine, thioridazine  This list may not describe all possible interactions. Give your health care provider a list of all the medicines, herbs, non-prescription drugs, or dietary supplements you use. Also tell them if you smoke, drink alcohol, or use illegal drugs. Some items may interact with your medicine.  What should I watch for while using this medicine?  Tell your doctor or health care professional if your symptoms do not start to get better or if they get worse.  You may get drowsy or dizzy. Do not drive, use machinery, or do anything that needs mental alertness until you know how this medicine affects you. Do not stand or sit up quickly, especially if you are an older patient. This reduces the risk of dizzy or fainting spells. Alcohol may interfere with the effect of this medicine. Avoid alcoholic drinks.  If you are taking another medicine that also causes drowsiness, you may have more side effects. Give your health care provider a list of all medicines you use. Your doctor will tell you how much medicine to take. Do not take more medicine than directed. Call emergency for help if you have problems breathing or unusual sleepiness.  What side effects may I notice from receiving this medicine?  Side effects that you should report to your doctor or health care professional as soon as possible:  -allergic reactions like skin rash, itching or hives, swelling of the face, lips, or tongue  -breathing problems  -unusually weak or tired  Side effects that usually do not require medical attention (report to your doctor or health care professional if they continue or are bothersome):  -anxious  -headache  -irritability  -upset stomach  This list may not describe all possible side effects. Call your doctor  for medical advice about side effects. You may report side effects to FDA at 9-978-CHZ-1953.  Where should I keep my medicine?  Keep out of the reach of children.  Store at room temperature between 15 and 30 degrees C (59 and 86 degrees F). Keep container tightly closed. Throw away any unused medicine after the expiration date.  NOTE: This sheet is a summary. It may not cover all possible information. If you have questions about this medicine, talk to your doctor, pharmacist, or health care provider.  © 2020 [employer]/Gold Standard (2016-09-27 12:20:58)  Trazodone tablets  What is this medicine?  TRAZODONE (TRAZ oh done) is used to treat depression.  This medicine may be used for other purposes; ask your health care provider or pharmacist if you have questions.  COMMON BRAND NAME(S): Maame  What should I tell my health care provider before I take this medicine?  They need to know if you have any of these conditions:  -attempted suicide or thinking about it  -bipolar disorder  -bleeding problems  -glaucoma  -heart disease, or previous heart attack  -irregular heart beat  -kidney or liver disease  -low levels of sodium in the blood  -an unusual or allergic reaction to trazodone, other medicines, foods, dyes or preservatives  -pregnant or trying to get pregnant  -breast-feeding  How should I use this medicine?  Take this medicine by mouth with a glass of water. Follow the directions on the prescription label. Take this medicine shortly after a meal or a light snack. Take your medicine at regular intervals. Do not take your medicine more often than directed. Do not stop taking this medicine suddenly except upon the advice of your doctor. Stopping this medicine too quickly may cause serious side effects or your condition may worsen.  A special MedGuide will be given to you by the pharmacist with each prescription and refill. Be sure to read this information carefully each time.  Talk to your pediatrician regarding the  use of this medicine in children. Special care may be needed.  Overdosage: If you think you have taken too much of this medicine contact a poison control center or emergency room at once.  NOTE: This medicine is only for you. Do not share this medicine with others.  What if I miss a dose?  If you miss a dose, take it as soon as you can. If it is almost time for your next dose, take only that dose. Do not take double or extra doses.  What may interact with this medicine?  Do not take this medicine with any of the following medications:  -certain medicines for fungal infections like fluconazole, itraconazole, ketoconazole, posaconazole, voriconazole  -cisapride  -dofetilide  -dronedarone  -linezolid  -MAOIs like Carbex, Eldepryl, Marplan, Nardil, and Parnate  -mesoridazine  -methylene blue (injected into a vein)  -pimozide  -saquinavir  -thioridazine  This medicine may also interact with the following medications:  -alcohol  -antiviral medicines for HIV or AIDS  -aspirin and aspirin-like medicines  -barbiturates like phenobarbital  -certain medicines for blood pressure, heart disease, irregular heart beat  -certain medicines for depression, anxiety, or psychotic disturbances  -certain medicines for migraine headache like almotriptan, eletriptan, frovatriptan, naratriptan, rizatriptan, sumatriptan, zolmitriptan  -certain medicines for seizures like carbamazepine and phenytoin  -certain medicines for sleep  -certain medicines that treat or prevent blood clots like dalteparin, enoxaparin, warfarin  -digoxin  -fentanyl  -lithium  -NSAIDS, medicines for pain and inflammation, like ibuprofen or naproxen  -other medicines that prolong the QT interval (cause an abnormal heart rhythm)  -rasagiline  -supplements like Nelson's wort, kava kava, valerian  -tramadol  -tryptophan  This list may not describe all possible interactions. Give your health care provider a list of all the medicines, herbs, non-prescription drugs, or  dietary supplements you use. Also tell them if you smoke, drink alcohol, or use illegal drugs. Some items may interact with your medicine.  What should I watch for while using this medicine?  Tell your doctor if your symptoms do not get better or if they get worse. Visit your doctor or health care professional for regular checks on your progress. Because it may take several weeks to see the full effects of this medicine, it is important to continue your treatment as prescribed by your doctor.  Patients and their families should watch out for new or worsening thoughts of suicide or depression. Also watch out for sudden changes in feelings such as feeling anxious, agitated, panicky, irritable, hostile, aggressive, impulsive, severely restless, overly excited and hyperactive, or not being able to sleep. If this happens, especially at the beginning of treatment or after a change in dose, call your health care professional.  You may get drowsy or dizzy. Do not drive, use machinery, or do anything that needs mental alertness until you know how this medicine affects you. Do not stand or sit up quickly, especially if you are an older patient. This reduces the risk of dizzy or fainting spells. Alcohol may interfere with the effect of this medicine. Avoid alcoholic drinks.  This medicine may cause dry eyes and blurred vision. If you wear contact lenses you may feel some discomfort. Lubricating drops may help. See your eye doctor if the problem does not go away or is severe.  Your mouth may get dry. Chewing sugarless gum, sucking hard candy and drinking plenty of water may help. Contact your doctor if the problem does not go away or is severe.  What side effects may I notice from receiving this medicine?  Side effects that you should report to your doctor or health care professional as soon as possible:  -allergic reactions like skin rash, itching or hives, swelling of the face, lips, or tongue  -elevated mood, decreased need  for sleep, racing thoughts, impulsive behavior  -confusion  -fast, irregular heartbeat  -feeling faint or lightheaded, falls  -feeling agitated, angry, or irritable  -loss of balance or coordination  -painful or prolonged erections  -restlessness, pacing, inability to keep still  -suicidal thoughts or other mood changes  -tremors  -trouble sleeping  -seizures  -unusual bleeding or bruising  Side effects that usually do not require medical attention (report to your doctor or health care professional if they continue or are bothersome):  -change in sex drive or performance  -change in appetite or weight  -constipation  -headache  -muscle aches or pains  -nausea  This list may not describe all possible side effects. Call your doctor for medical advice about side effects. You may report side effects to FDA at 8-850-FDA-1334.  Where should I keep my medicine?  Keep out of the reach of children.  Store at room temperature between 15 and 30 degrees C (59 to 86 degrees F). Protect from light. Keep container tightly closed. Throw away any unused medicine after the expiration date.  NOTE: This sheet is a summary. It may not cover all possible information. If you have questions about this medicine, talk to your doctor, pharmacist, or health care provider.  © 2020 [employer]/Gold Standard (2019-02-26 17:51:24)

## 2020-05-08 ENCOUNTER — TELEPHONE (OUTPATIENT)
Dept: INTERNAL MEDICINE | Facility: CLINIC | Age: 35
End: 2020-05-08

## 2020-05-08 NOTE — TELEPHONE ENCOUNTER
Patient stated that he is going to have surgery next week and he is scheduled for a COVID test today.  He was wondering if he still needs to have this test run. They said as long as he tests negative, he can have surgery. He can be reached at [phone]

## 2020-07-02 DIAGNOSIS — L64.9 MALE PATTERN BALDNESS: ICD-10-CM

## 2020-07-02 RX ORDER — FINASTERIDE 1 MG/1
TABLET, FILM COATED ORAL
Qty: 30 TABLET | Refills: 0 | Status: SHIPPED | OUTPATIENT
Start: 2020-07-02 | End: 2020-08-19 | Stop reason: SDUPTHER

## 2020-07-17 ENCOUNTER — TELEPHONE (OUTPATIENT)
Dept: INTERNAL MEDICINE | Facility: CLINIC | Age: 35
End: 2020-07-17

## 2020-07-21 ENCOUNTER — OFFICE VISIT (OUTPATIENT)
Dept: INTERNAL MEDICINE | Facility: CLINIC | Age: 35
End: 2020-07-21

## 2020-07-21 VITALS
WEIGHT: 186.2 LBS | HEART RATE: 92 BPM | OXYGEN SATURATION: 99 % | DIASTOLIC BLOOD PRESSURE: 74 MMHG | BODY MASS INDEX: 28.31 KG/M2 | SYSTOLIC BLOOD PRESSURE: 148 MMHG

## 2020-07-21 DIAGNOSIS — M54.6 CHRONIC MIDLINE THORACIC BACK PAIN: ICD-10-CM

## 2020-07-21 DIAGNOSIS — F41.1 GENERALIZED ANXIETY DISORDER: Primary | ICD-10-CM

## 2020-07-21 DIAGNOSIS — G89.29 CHRONIC MIDLINE THORACIC BACK PAIN: ICD-10-CM

## 2020-07-21 PROCEDURE — 99213 OFFICE O/P EST LOW 20 MIN: CPT | Performed by: NURSE PRACTITIONER

## 2020-07-21 RX ORDER — DULOXETIN HYDROCHLORIDE 30 MG/1
30 CAPSULE, DELAYED RELEASE ORAL DAILY
Qty: 14 CAPSULE | Refills: 0 | Status: SHIPPED | OUTPATIENT
Start: 2020-07-21 | End: 2020-07-22

## 2020-07-21 NOTE — PROGRESS NOTES
Chief Complaint   Patient presents with   • Anxiety   • Back Pain       History of Present Illness  35 y.o.male presents for anxiety and back pain.  Patient continues to have increased anxiety chronic onset greater than a year.  We had discussed in the past possibly starting on Cymbalta since she also had chronic back pain issues.  Patient has been trying to not do medications for his anxiety if possible.  He does still have insomnia.  He has been taking 1-2 of the trazodone nightly as well as additional over-the-counter sleep medicines.  Thinks sleep is more of a problem because of his anxiety.  Positive for generalized back pain aching chronic onset greater than 6 months. Mostly upper back; Pain does not radiate.  Patient would like to try starting on the Cymbalta to see if helps both anxiety and back pain. Pt expressed concern of possible side effect of ED with med.      Review of Systems   Constitutional: Negative for chills and fever.   Cardiovascular: Negative for palpitations.   Musculoskeletal: Positive for arthralgias and back pain.   Neurological: Negative for weakness.   Psychiatric/Behavioral: Positive for sleep disturbance. Negative for dysphoric mood and depressed mood. The patient is nervous/anxious.          [facility]  The following portions of the patient's history were reviewed and updated as appropriate: allergies, current medications, past family history, past medical history, past social history, past surgical history and problem list.     Past Medical History:   Diagnosis Date   • Chronic back pain       No past surgical history on file.   No Known Allergies   Social History     Socioeconomic History   • Marital status: Unknown   Tobacco Use   • Smoking status: Former Smoker     Packs/day: 0.00     Years: 0.00     Pack years: 0.00     Types: Cigarettes     Last attempt to quit: 3/2/2018     Years since quittin.3   • Smokeless tobacco: Current User   • Tobacco comment: just when drinking    Substance and Sexual Activity   • Alcohol use: Yes     Comment: Quit 3 days ago.  Sometims would drink 5-10 drinks liquor or beer.   • Drug use: No   • Sexual activity: Defer     Family History   Problem Relation Age of Onset   • Diabetes Mother    • Heart disease Maternal Grandfather    • Alcohol abuse Maternal Grandfather    • Cancer Father    • Alcohol abuse Father             Current Outpatient Medications:   •  finasteride (PROPECIA) 1 MG tablet, TAKE ONE TABLET BY MOUTH DAILY, Disp: 30 tablet, Rfl: 0  •  traZODone (DESYREL) 50 MG tablet, Take 1 tablet by mouth At Night As Needed for Sleep., Disp: 30 tablet, Rfl: 0    VITALS:  /74   Pulse 92   Wt 84.5 kg (186 lb 3.2 oz)   SpO2 99%   BMI 28.31 kg/m²     Physical Exam   Constitutional: He appears well-developed and well-nourished. No distress.   Cardiovascular: Normal rate, regular rhythm and normal heart sounds.   Pulmonary/Chest: Effort normal and breath sounds normal. No respiratory distress.   Musculoskeletal:        Thoracic back: He exhibits pain.   Neurological: He is alert.   Psychiatric: His speech is normal. His mood appears anxious. He does not exhibit a depressed mood.       LABS  No new labs      ASSESSMENT/PLAN  Sunny was seen today for anxiety and back pain.    Diagnoses and all orders for this visit:    Generalized anxiety disorder  DULoxetine (CYMBALTA) 30 MG capsule; Take 1 capsule by mouth Daily.  -     DULoxetine (CYMBALTA) 60 MG capsule; Take 1 capsule by mouth Daily.    Chronic midline thoracic back pain  : DULoxetine (CYMBALTA) 30 MG capsule; Take 1 capsule by mouth Daily.  -     DULoxetine (CYMBALTA) 60 MG capsule; Take 1 capsule by mouth Daily.    Will start on a 30 mg capsule per day for 2 weeks then increase to a 60 mg capsule per day.  Follow-up in 6 weeks.   msg sent to pharmacy regarding step up therapy on cymbalta.  If pt develops ED, he is to let me know and will send rx.    Counseled patient regarding multimodal  approach with healthy nutrition, healthy sleep, regular physical activity, social activities, and medications.  Reviewed medication options and common side effects. Patient would like to proceed with cymbalta.  Advised some antianxiety meds can take several weeks to see improvement in symptoms, and is important to take medication as prescribed.  Some antianxiety especially in younger populations can worsen depression symptoms.  If any thoughts of self harm or suicidal ideations, pt should contact our office for immediate evaluation or seek emergency care.    I discussed the patients findings and my recommendations with patient.  Patient was encouraged to keep me informed of any acute changes, lack of improvement, or any new concerning symptoms.  Patient voiced understanding of all instructions and denied further questions.      FOLLOW-UP  Return in about 6 weeks (around 9/1/2020), or if symptoms worsen or fail to improve.    Electronically signed by:    YEE Hardy  07/21/2020    EMR Dragon/Transcription Disclaimer:  Much of this encounter note is an electronic transcription/translation of spoken language to printed text.  The electronic translation of spoken language may permit erroneous, or at times, nonsensical words or phrases to be inadvertently transcribed.  Although I have reviewed the note for such errors, some may still exist

## 2020-07-22 RX ORDER — DULOXETIN HYDROCHLORIDE 60 MG/1
60 CAPSULE, DELAYED RELEASE ORAL DAILY
Qty: 30 CAPSULE | Refills: 0 | Status: SHIPPED | OUTPATIENT
Start: 2020-07-22 | End: 2020-09-28 | Stop reason: SDUPTHER

## 2020-07-29 ENCOUNTER — TELEPHONE (OUTPATIENT)
Dept: INTERNAL MEDICINE | Facility: CLINIC | Age: 35
End: 2020-07-29

## 2020-07-29 NOTE — TELEPHONE ENCOUNTER
PT CALLED STATED THAT HE HAS BEEN HAVING SIDE EFFECTS FROM RX CYMBALTA, MAJOR SIDE EFFECT IS HIS SEXUAL FUNCTIONING.    PLEASE ADVISE.  CALL BACK:[phone]     GEORGINA LATIF [address] [place] [id number] [facility] AT [facility]

## 2020-07-30 NOTE — TELEPHONE ENCOUNTER
Pt had brought this up last appt.  I had told him if had ED I would send him a med for this.  Call him and let him know I will be back in office Monday and I will address at that time.

## 2020-07-31 NOTE — TELEPHONE ENCOUNTER
Called pt and went ahead and made him an apt. But will double check with Penelope if he needs this.

## 2020-08-03 RX ORDER — SILDENAFIL CITRATE 20 MG/1
60 TABLET ORAL DAILY PRN
Qty: 90 TABLET | Refills: 1 | Status: SHIPPED | OUTPATIENT
Start: 2020-08-03 | End: 2020-09-28 | Stop reason: SDUPTHER

## 2020-08-19 DIAGNOSIS — L64.9 MALE PATTERN BALDNESS: ICD-10-CM

## 2020-08-19 DIAGNOSIS — G47.00 INSOMNIA, UNSPECIFIED TYPE: ICD-10-CM

## 2020-08-19 RX ORDER — TRAZODONE HYDROCHLORIDE 50 MG/1
50 TABLET ORAL NIGHTLY PRN
Qty: 30 TABLET | Refills: 0 | Status: SHIPPED | OUTPATIENT
Start: 2020-08-19 | End: 2020-09-28 | Stop reason: SDUPTHER

## 2020-08-19 RX ORDER — FINASTERIDE 1 MG/1
1 TABLET, FILM COATED ORAL DAILY
Qty: 30 TABLET | Refills: 2 | Status: SHIPPED | OUTPATIENT
Start: 2020-08-19 | End: 2020-11-17 | Stop reason: SDUPTHER

## 2020-08-19 NOTE — TELEPHONE ENCOUNTER
PATIENT IS REQUESTING A REFILL ON FINASTERIDE, AND TRAZODONE. HIS PHARMACY IS [facility] IN [place]. PATIENT CAN BE REACHED [phone].

## 2020-09-25 ENCOUNTER — TELEPHONE (OUTPATIENT)
Dept: INTERNAL MEDICINE | Facility: CLINIC | Age: 35
End: 2020-09-25

## 2020-09-25 NOTE — TELEPHONE ENCOUNTER
Patient states that Penelope put him on an anti-depressant, but it is making him feel fatigue and he would like to try Cabergoline.  He has some friends who have tried this and have had success.  His pharmacy is [facility].  She can be reached at  [phone]

## 2020-09-25 NOTE — TELEPHONE ENCOUNTER
PT notified that he will need to be seen in office for any medication changes that he is requesting

## 2020-09-28 ENCOUNTER — OFFICE VISIT (OUTPATIENT)
Dept: INTERNAL MEDICINE | Facility: CLINIC | Age: 35
End: 2020-09-28

## 2020-09-28 ENCOUNTER — LAB (OUTPATIENT)
Dept: LAB | Facility: HOSPITAL | Age: 35
End: 2020-09-28

## 2020-09-28 VITALS
BODY MASS INDEX: 28.01 KG/M2 | OXYGEN SATURATION: 98 % | SYSTOLIC BLOOD PRESSURE: 142 MMHG | WEIGHT: 184.2 LBS | HEART RATE: 92 BPM | TEMPERATURE: 96.7 F | DIASTOLIC BLOOD PRESSURE: 78 MMHG

## 2020-09-28 DIAGNOSIS — N52.9 ERECTILE DYSFUNCTION, UNSPECIFIED ERECTILE DYSFUNCTION TYPE: ICD-10-CM

## 2020-09-28 DIAGNOSIS — M54.6 CHRONIC MIDLINE THORACIC BACK PAIN: ICD-10-CM

## 2020-09-28 DIAGNOSIS — G89.29 CHRONIC MIDLINE THORACIC BACK PAIN: ICD-10-CM

## 2020-09-28 DIAGNOSIS — R53.83 FATIGUE, UNSPECIFIED TYPE: ICD-10-CM

## 2020-09-28 DIAGNOSIS — R53.83 FATIGUE, UNSPECIFIED TYPE: Primary | ICD-10-CM

## 2020-09-28 DIAGNOSIS — R79.89 ELEVATED PROLACTIN LEVEL: ICD-10-CM

## 2020-09-28 DIAGNOSIS — G47.00 INSOMNIA, UNSPECIFIED TYPE: ICD-10-CM

## 2020-09-28 DIAGNOSIS — F41.1 GENERALIZED ANXIETY DISORDER: ICD-10-CM

## 2020-09-28 LAB
25(OH)D3 SERPL-MCNC: 37.8 NG/ML (ref 30–100)
ALBUMIN SERPL-MCNC: 4.3 G/DL (ref 3.5–5.2)
ALBUMIN/GLOB SERPL: 1.2 G/DL
ALP SERPL-CCNC: 35 U/L (ref 39–117)
ALT SERPL W P-5'-P-CCNC: 55 U/L (ref 1–41)
ANION GAP SERPL CALCULATED.3IONS-SCNC: 11.2 MMOL/L (ref 5–15)
AST SERPL-CCNC: 42 U/L (ref 1–40)
BASOPHILS # BLD AUTO: 0.13 10*3/MM3 (ref 0–0.2)
BASOPHILS NFR BLD AUTO: 0.8 % (ref 0–1.5)
BILIRUB SERPL-MCNC: 0.5 MG/DL (ref 0–1.2)
BUN SERPL-MCNC: 10 MG/DL (ref 6–20)
BUN/CREAT SERPL: 9.5 (ref 7–25)
CALCIUM SPEC-SCNC: 9.8 MG/DL (ref 8.6–10.5)
CHLORIDE SERPL-SCNC: 99 MMOL/L (ref 98–107)
CO2 SERPL-SCNC: 22.8 MMOL/L (ref 22–29)
CREAT SERPL-MCNC: 1.05 MG/DL (ref 0.76–1.27)
DEPRECATED RDW RBC AUTO: 51.2 FL (ref 37–54)
EOSINOPHIL # BLD AUTO: 0.14 10*3/MM3 (ref 0–0.4)
EOSINOPHIL NFR BLD AUTO: 0.9 % (ref 0.3–6.2)
ERYTHROCYTE [DISTWIDTH] IN BLOOD BY AUTOMATED COUNT: 15.3 % (ref 12.3–15.4)
GFR SERPL CREATININE-BSD FRML MDRD: 80 ML/MIN/1.73
GLOBULIN UR ELPH-MCNC: 3.7 GM/DL
GLUCOSE SERPL-MCNC: 86 MG/DL (ref 65–99)
HBA1C MFR BLD: 5.67 % (ref 4.8–5.6)
HCT VFR BLD AUTO: 44.6 % (ref 37.5–51)
HGB BLD-MCNC: 14.7 G/DL (ref 13–17.7)
IMM GRANULOCYTES # BLD AUTO: 0.08 10*3/MM3 (ref 0–0.05)
IMM GRANULOCYTES NFR BLD AUTO: 0.5 % (ref 0–0.5)
IRON 24H UR-MRATE: 29 MCG/DL (ref 59–158)
LYMPHOCYTES # BLD AUTO: 2.59 10*3/MM3 (ref 0.7–3.1)
LYMPHOCYTES NFR BLD AUTO: 16 % (ref 19.6–45.3)
MCH RBC QN AUTO: 30.3 PG (ref 26.6–33)
MCHC RBC AUTO-ENTMCNC: 33 G/DL (ref 31.5–35.7)
MCV RBC AUTO: 92 FL (ref 79–97)
MONOCYTES # BLD AUTO: 1.27 10*3/MM3 (ref 0.1–0.9)
MONOCYTES NFR BLD AUTO: 7.8 % (ref 5–12)
NEUTROPHILS NFR BLD AUTO: 11.99 10*3/MM3 (ref 1.7–7)
NEUTROPHILS NFR BLD AUTO: 74 % (ref 42.7–76)
NRBC BLD AUTO-RTO: 0 /100 WBC (ref 0–0.2)
PLATELET # BLD AUTO: 610 10*3/MM3 (ref 140–450)
PMV BLD AUTO: 10.4 FL (ref 6–12)
POTASSIUM SERPL-SCNC: 4.4 MMOL/L (ref 3.5–5.2)
PROLACTIN SERPL-MCNC: 39.2 NG/ML (ref 4.04–15.2)
PROT SERPL-MCNC: 8 G/DL (ref 6–8.5)
RBC # BLD AUTO: 4.85 10*6/MM3 (ref 4.14–5.8)
SODIUM SERPL-SCNC: 133 MMOL/L (ref 136–145)
TSH SERPL DL<=0.05 MIU/L-ACNC: 2.04 UIU/ML (ref 0.27–4.2)
VIT B12 BLD-MCNC: 405 PG/ML (ref 211–946)
WBC # BLD AUTO: 16.2 10*3/MM3 (ref 3.4–10.8)

## 2020-09-28 PROCEDURE — 83540 ASSAY OF IRON: CPT | Performed by: NURSE PRACTITIONER

## 2020-09-28 PROCEDURE — 84403 ASSAY OF TOTAL TESTOSTERONE: CPT | Performed by: NURSE PRACTITIONER

## 2020-09-28 PROCEDURE — 84402 ASSAY OF FREE TESTOSTERONE: CPT | Performed by: NURSE PRACTITIONER

## 2020-09-28 PROCEDURE — 80050 GENERAL HEALTH PANEL: CPT | Performed by: NURSE PRACTITIONER

## 2020-09-28 PROCEDURE — 99214 OFFICE O/P EST MOD 30 MIN: CPT | Performed by: NURSE PRACTITIONER

## 2020-09-28 PROCEDURE — 82306 VITAMIN D 25 HYDROXY: CPT | Performed by: NURSE PRACTITIONER

## 2020-09-28 PROCEDURE — 82607 VITAMIN B-12: CPT | Performed by: NURSE PRACTITIONER

## 2020-09-28 PROCEDURE — 84146 ASSAY OF PROLACTIN: CPT | Performed by: NURSE PRACTITIONER

## 2020-09-28 PROCEDURE — 83036 HEMOGLOBIN GLYCOSYLATED A1C: CPT | Performed by: NURSE PRACTITIONER

## 2020-09-28 RX ORDER — LIDOCAINE, MENTHOL 4.8; 1.2 G/120G; G/120G
1 GEL ORAL EVERY 6 HOURS PRN
Qty: 120 G | Refills: 2 | Status: SHIPPED | OUTPATIENT
Start: 2020-09-28 | End: 2020-11-25 | Stop reason: ALTCHOICE

## 2020-09-28 RX ORDER — TRAZODONE HYDROCHLORIDE 50 MG/1
50 TABLET ORAL NIGHTLY PRN
Qty: 30 TABLET | Refills: 11 | Status: SHIPPED | OUTPATIENT
Start: 2020-09-28 | End: 2020-11-03

## 2020-09-28 RX ORDER — DULOXETIN HYDROCHLORIDE 60 MG/1
60 CAPSULE, DELAYED RELEASE ORAL DAILY
Qty: 30 CAPSULE | Refills: 11 | Status: SHIPPED | OUTPATIENT
Start: 2020-09-28 | End: 2020-11-25 | Stop reason: ALTCHOICE

## 2020-09-28 RX ORDER — SILDENAFIL CITRATE 20 MG/1
60 TABLET ORAL DAILY PRN
Qty: 90 TABLET | Refills: 1 | Status: SHIPPED | OUTPATIENT
Start: 2020-09-28 | End: 2020-11-03 | Stop reason: SDUPTHER

## 2020-09-28 NOTE — PROGRESS NOTES
Chief Complaint   Patient presents with   • Fatigue   • Anxiety   • Insomnia       History of Present Illness  35 y.o.male presents for follow up med refill anxiety and insomnia; new complaint  fatigue.    Anxiety  Presents for follow-up visit. Symptoms include insomnia and nervous/anxious behavior. Patient reports no chest pain, nausea, palpitations, shortness of breath or suicidal ideas. Symptoms occur most days. The severity of symptoms is mild.     Compliance with medications: Takes Cymbalta.  Tried to come off of medication but was not able to stay off.  Has resumed.  Needs refill.   Insomnia  This is a chronic problem. The current episode started more than 1 year ago. The problem occurs every several days. Associated symptoms include arthralgias, fatigue and myalgias. Pertinent negatives include no abdominal pain, change in bowel habit, chest pain, chills, fever, headaches, nausea or weakness. Treatments tried: Trazodone. The treatment provided mild relief.   Fatigue  This is a new problem. The current episode started 1 to 4 weeks ago (Onset 1 month). The problem occurs constantly. The problem has been gradually worsening (Describes as extreme fatigue). Associated symptoms include arthralgias, fatigue and myalgias. Pertinent negatives include no abdominal pain, change in bowel habit, chest pain, chills, fever, headaches, nausea or weakness. Nothing aggravates the symptoms.   has been taking prohormones ostarine and nk26 supplements. Lifts weights. Tried taking a friends cabergoline and fatigue improved with this.    Chronic back pain; need refill of muscle cream.    ED: onset months. Takes sildenafil without difficulties.     Review of Systems   Constitutional: Positive for fatigue. Negative for chills and fever.   Respiratory: Negative for shortness of breath.    Cardiovascular: Negative for chest pain and palpitations.   Gastrointestinal: Negative for abdominal pain, change in bowel habit, constipation,  diarrhea and nausea.   Musculoskeletal: Positive for arthralgias and myalgias.   Neurological: Negative for weakness.   Psychiatric/Behavioral: Positive for sleep disturbance. Negative for suicidal ideas. The patient is nervous/anxious and has insomnia.          [facility]  The following portions of the patient's history were reviewed and updated as appropriate: allergies, current medications, past family history, past medical history, past social history, past surgical history and problem list.     Past Medical History:   Diagnosis Date   • Chronic back pain       No past surgical history on file.   No Known Allergies   Social History     Socioeconomic History   Tobacco Use   • Smoking status: Former Smoker     Packs/day: 0.00     Years: 0.00     Pack years: 0.00     Types: Cigarettes     Quit date: 3/2/2018     Years since quittin.5   • Smokeless tobacco: Current User   • Tobacco comment: just when drinking   Substance and Sexual Activity   • Alcohol use: Yes     Comment: Quit 3 days ago.  Sometims would drink 5-10 drinks liquor or beer.   • Drug use: No   • Sexual activity: Defer     Family History   Problem Relation Age of Onset   • Diabetes Mother    • Heart disease Maternal Grandfather    • Alcohol abuse Maternal Grandfather    • Cancer Father    • Alcohol abuse Father             Current Outpatient Medications:   •  DULoxetine (CYMBALTA) 60 MG capsule, Take 1 capsule by mouth Daily., Disp: 30 capsule, Rfl: 0  •  finasteride (PROPECIA) 1 MG tablet, Take 1 tablet by mouth Daily., Disp: 30 tablet, Rfl: 2  •  sildenafil (REVATIO) 20 MG tablet, Take 3 tablets by mouth Daily As Needed (erectile dysfunction). Take 1 hour prior to need, Disp: 90 tablet, Rfl: 1  •  traZODone (DESYREL) 50 MG tablet, Take 1 tablet by mouth At Night As Needed for Sleep., Disp: 30 tablet, Rfl: 0    VITALS:  /78   Pulse 92   Temp 96.7 °F (35.9 °C)   Wt 83.6 kg (184 lb 3.2 oz)   SpO2 98%   BMI 28.01 kg/m²     Physical Exam  Vitals  signs reviewed.   Constitutional:       Comments: Muscular chest arms   HENT:      Head: Normocephalic.      Nose: Nose normal.   Eyes:      Extraocular Movements: Extraocular movements intact.      Conjunctiva/sclera: Conjunctivae normal.      Pupils: Pupils are equal, round, and reactive to light.   Neck:      Musculoskeletal: Neck supple.      Thyroid: No thyromegaly.   Cardiovascular:      Rate and Rhythm: Normal rate and regular rhythm.   Pulmonary:      Effort: Pulmonary effort is normal.      Breath sounds: Normal breath sounds.   Abdominal:      General: Bowel sounds are normal. There is no distension.      Palpations: Abdomen is soft. There is no hepatomegaly or splenomegaly.      Tenderness: There is no abdominal tenderness.   Skin:     General: Skin is warm and dry.   Neurological:      Mental Status: He is alert and oriented to person, place, and time. Mental status is at baseline.      Coordination: Coordination normal.      Gait: Gait normal.         LABS  pending      ASSESSMENT/PLAN  Sunny was seen today for med refill.    Diagnoses and all orders for this visit:    Fatigue, unspecified type  -     CBC & Differential; Future  -     Comprehensive Metabolic Panel; Future  -     Hemoglobin A1c; Future  -     TSH Rfx On Abnormal To Free T4; Future  -     Iron; Future  -     Testosterone, free, total; Future  -     Vitamin B12; Future  -     Vitamin D 25 Hydroxy; Future  -     Prolactin; Future    Generalized anxiety disorder  -     DULoxetine (CYMBALTA) 60 MG capsule; Take 1 capsule by mouth Daily.    Insomnia, unspecified type  Comments:  Practice good sleep hygeine (going to bed same time, limiting caffeine and heavy meals prior to bed, avoid naps)  Orders:  -     traZODone (DESYREL) 50 MG tablet; Take 1 tablet by mouth At Night As Needed for Sleep.    Erectile dysfunction, unspecified erectile dysfunction type  -     sildenafil (REVATIO) 20 MG tablet; Take 3 tablets by mouth Daily As Needed (erectile  dysfunction). Take 1 hour prior to need    Chronic midline thoracic back pain  -     Lidocaine-Menthol 4-1 % gel; Apply 1 application topically Every 6 (Six) Hours As Needed (muscle aches).        I discussed the patients findings and my recommendations with patient.  Patient was encouraged to keep me informed of any acute changes, lack of improvement, or any new concerning symptoms.  Patient voiced understanding of all instructions and denied further questions.      FOLLOW-UP  Return in about 6 months (around 3/28/2021), or if symptoms worsen or fail to improve, for Annual.    Electronically signed by:    YEE Hardy  09/28/2020    EMR Dragon/Transcription Disclaimer:  Much of this encounter note is an electronic transcription/translation of spoken language to printed text.  The electronic translation of spoken language may permit erroneous, or at times, nonsensical words or phrases to be inadvertently transcribed.  Although I have reviewed the note for such errors, some may still exist

## 2020-09-30 ENCOUNTER — TELEPHONE (OUTPATIENT)
Dept: INTERNAL MEDICINE | Facility: CLINIC | Age: 35
End: 2020-09-30

## 2020-09-30 LAB
TESTOST FREE SERPL-MCNC: >50 PG/ML (ref 8.7–25.1)
TESTOST SERPL-MCNC: >1500 NG/DL (ref 264–916)

## 2020-10-06 ENCOUNTER — TELEPHONE (OUTPATIENT)
Dept: INTERNAL MEDICINE | Facility: CLINIC | Age: 35
End: 2020-10-06

## 2020-10-06 DIAGNOSIS — R79.89 ELEVATED PROLACTIN LEVEL: Primary | ICD-10-CM

## 2020-10-06 DIAGNOSIS — R53.83 FATIGUE, UNSPECIFIED TYPE: ICD-10-CM

## 2020-10-06 NOTE — TELEPHONE ENCOUNTER
Caller: Sunny Drummond    Relationship: Self    Best call back number: [phone]    What medication are you requesting: cabergoline    What are your current symptoms: lethargy    How long have you been experiencing symptoms: 2 weeks    If a prescription is needed, what is your preferred pharmacy and phone number: [facility] [address] [place], [place] - [address] [facility] [phone] [facility] [phone]      Additional notes:  Patient stated he has been extremely lethargic for about 2 weeks. Patient has tried changing his supplements and his diet but this did not help him. Patient stated he is about to start back to work and will be unable to make an appointment within the next week.

## 2020-10-06 NOTE — TELEPHONE ENCOUNTER
Please return call to pt.  We first need to confirm if he has a prolactinoma or tumor of the pituitary gland that would be causing the elevated prolactin level.  Someone should call him to schedule the MRI and I have also sent a message to the referral coordinator.  If he has a prolactinoma then he would be referred to an endocrinologist for eval and treatment recommendations. The specialist would be the one to decide if cabergoline would be prescribed.  I would not just start him on the cabergoline without appropriate diagnostics and evaluation; nor would I recommend that he take friends prescription for such without the workup.  I also went ahead and put in the referral to endocrine since he has the elevated prolactin so he can get on their schedule, as well as get the MRI scheduled.

## 2020-10-08 DIAGNOSIS — R53.83 FATIGUE, UNSPECIFIED TYPE: Primary | ICD-10-CM

## 2020-10-20 ENCOUNTER — HOSPITAL ENCOUNTER (OUTPATIENT)
Dept: MRI IMAGING | Facility: HOSPITAL | Age: 35
Discharge: HOME | End: 2020-10-20
Admitting: NURSE PRACTITIONER

## 2020-10-20 DIAGNOSIS — R79.89 ELEVATED PROLACTIN LEVEL: ICD-10-CM

## 2020-10-20 PROCEDURE — A9577 INJ MULTIHANCE: HCPCS | Performed by: NURSE PRACTITIONER

## 2020-10-20 PROCEDURE — 0 GADOBENATE DIMEGLUMINE 529 MG/ML SOLUTION: Performed by: NURSE PRACTITIONER

## 2020-10-20 PROCEDURE — 70553 MRI BRAIN STEM W/O & W/DYE: CPT

## 2020-10-20 RX ADMIN — GADOBENATE DIMEGLUMINE 15 ML: 529 INJECTION, SOLUTION INTRAVENOUS at 10:02

## 2020-10-23 ENCOUNTER — TELEPHONE (OUTPATIENT)
Dept: INTERNAL MEDICINE | Facility: CLINIC | Age: 35
End: 2020-10-23

## 2020-10-23 NOTE — TELEPHONE ENCOUNTER
Caller: Sunny Drummond    Relationship: Self    Best call back number: [phone]    Caller requesting test results: self     What test was performed: mri      When was the test performed: 10/1/20    Where was the test performed:     Additional notes:

## 2020-10-23 NOTE — TELEPHONE ENCOUNTER
PT notified of labs results and MRI results. Apt with endo scheduled for 11/29 pt very concerned with what he should do until then.No energy very sluggish barely able to work, would like to know your recommendations on what to do until he sees Endo.

## 2020-10-26 NOTE — TELEPHONE ENCOUNTER
Please have pt schedule an appt for follow up; preferably early am as need to check his cortisol levels.

## 2020-11-03 ENCOUNTER — TELEPHONE (OUTPATIENT)
Dept: INTERNAL MEDICINE | Facility: CLINIC | Age: 35
End: 2020-11-03

## 2020-11-03 ENCOUNTER — LAB (OUTPATIENT)
Dept: LAB | Facility: HOSPITAL | Age: 35
End: 2020-11-03

## 2020-11-03 ENCOUNTER — OFFICE VISIT (OUTPATIENT)
Dept: INTERNAL MEDICINE | Facility: CLINIC | Age: 35
End: 2020-11-03

## 2020-11-03 VITALS
TEMPERATURE: 97.3 F | BODY MASS INDEX: 28.19 KG/M2 | OXYGEN SATURATION: 99 % | HEART RATE: 88 BPM | WEIGHT: 186 LBS | HEIGHT: 68 IN | SYSTOLIC BLOOD PRESSURE: 138 MMHG | DIASTOLIC BLOOD PRESSURE: 84 MMHG

## 2020-11-03 DIAGNOSIS — N52.9 ERECTILE DYSFUNCTION, UNSPECIFIED ERECTILE DYSFUNCTION TYPE: ICD-10-CM

## 2020-11-03 DIAGNOSIS — G47.00 INSOMNIA, UNSPECIFIED TYPE: ICD-10-CM

## 2020-11-03 DIAGNOSIS — R53.83 FATIGUE, UNSPECIFIED TYPE: Primary | ICD-10-CM

## 2020-11-03 DIAGNOSIS — R53.83 FATIGUE, UNSPECIFIED TYPE: ICD-10-CM

## 2020-11-03 LAB
CORTIS SERPL-MCNC: 12.01 MCG/DL
FERRITIN SERPL-MCNC: 278 NG/ML (ref 30–400)
HETEROPH AB SER QL LA: NEGATIVE
IRON 24H UR-MRATE: 72 MCG/DL (ref 59–158)
IRON SATN MFR SERPL: 18 % (ref 20–50)
PROLACTIN SERPL-MCNC: 30.3 NG/ML (ref 4.04–15.2)
TIBC SERPL-MCNC: 405 MCG/DL (ref 298–536)
TRANSFERRIN SERPL-MCNC: 272 MG/DL (ref 200–360)

## 2020-11-03 PROCEDURE — 86308 HETEROPHILE ANTIBODY SCREEN: CPT | Performed by: NURSE PRACTITIONER

## 2020-11-03 PROCEDURE — 84466 ASSAY OF TRANSFERRIN: CPT | Performed by: NURSE PRACTITIONER

## 2020-11-03 PROCEDURE — 83540 ASSAY OF IRON: CPT | Performed by: NURSE PRACTITIONER

## 2020-11-03 PROCEDURE — 82533 TOTAL CORTISOL: CPT | Performed by: NURSE PRACTITIONER

## 2020-11-03 PROCEDURE — 99214 OFFICE O/P EST MOD 30 MIN: CPT | Performed by: NURSE PRACTITIONER

## 2020-11-03 PROCEDURE — 82024 ASSAY OF ACTH: CPT | Performed by: NURSE PRACTITIONER

## 2020-11-03 PROCEDURE — 82530 CORTISOL FREE: CPT | Performed by: NURSE PRACTITIONER

## 2020-11-03 PROCEDURE — 82728 ASSAY OF FERRITIN: CPT | Performed by: NURSE PRACTITIONER

## 2020-11-03 PROCEDURE — 84403 ASSAY OF TOTAL TESTOSTERONE: CPT | Performed by: NURSE PRACTITIONER

## 2020-11-03 PROCEDURE — 84402 ASSAY OF FREE TESTOSTERONE: CPT | Performed by: NURSE PRACTITIONER

## 2020-11-03 PROCEDURE — 86618 LYME DISEASE ANTIBODY: CPT | Performed by: NURSE PRACTITIONER

## 2020-11-03 PROCEDURE — 36415 COLL VENOUS BLD VENIPUNCTURE: CPT

## 2020-11-03 PROCEDURE — 86038 ANTINUCLEAR ANTIBODIES: CPT | Performed by: NURSE PRACTITIONER

## 2020-11-03 PROCEDURE — 84146 ASSAY OF PROLACTIN: CPT | Performed by: NURSE PRACTITIONER

## 2020-11-03 PROCEDURE — 85652 RBC SED RATE AUTOMATED: CPT | Performed by: NURSE PRACTITIONER

## 2020-11-03 RX ORDER — SILDENAFIL CITRATE 20 MG/1
60 TABLET ORAL DAILY PRN
Qty: 90 TABLET | Refills: 1 | Status: SHIPPED | OUTPATIENT
Start: 2020-11-03 | End: 2020-11-08

## 2020-11-03 RX ORDER — TRAZODONE HYDROCHLORIDE 100 MG/1
100 TABLET ORAL NIGHTLY
Qty: 30 TABLET | Refills: 5 | Status: SHIPPED | OUTPATIENT
Start: 2020-11-03 | End: 2021-05-14 | Stop reason: SDUPTHER

## 2020-11-03 NOTE — TELEPHONE ENCOUNTER
PT CALLED TO STATE THAT HIS INSURANCE COVERS TADALAFIL     PT WAS PRESCRIBED SILDENAFIL, BUT HE STATES THAT HE WAS TOLD TO CHECK WITH HIS INSURANCE ON THE TADALAFIL

## 2020-11-03 NOTE — PROGRESS NOTES
Chief Complaint   Patient presents with   • Fatigue     Follow up       History of Present Illness  35 y.o.male presents for fatigue    Fatigue  The current episode started more than 1 month ago. The problem occurs constantly. The problem has been gradually worsening. Associated symptoms include arthralgias and fatigue. Pertinent negatives include no abdominal pain, chest pain, chills, coughing, fever, headaches, myalgias, nausea, rash or urinary symptoms. Nothing (tired all the time) aggravates the symptoms.   normally lifts weights works out and takes supplements. When labs showed elevated prolactin; stopped taking supplements so can see how labs are. Recent MRI brain negative for prolactinoma. Has difficulty working so tired. Occasional snoring. Has difficulty with sleep.  Insomnia chronic onset months; takes melatonin and trazadone. Feels like needs increased dose sleep med.  ED; onset after starting cymbalta. Takes sildenafil. Asking about ED med that he can take daily.      Review of Systems   Constitutional: Positive for fatigue. Negative for chills and fever.   Respiratory: Positive for shortness of breath. Negative for cough.    Cardiovascular: Negative for chest pain.   Gastrointestinal: Negative for abdominal pain and nausea.   Genitourinary: Positive for erectile dysfunction.   Musculoskeletal: Positive for arthralgias. Negative for myalgias.   Skin: Negative for rash.   Psychiatric/Behavioral: Positive for sleep disturbance. Negative for self-injury, suicidal ideas and depressed mood. The patient is not nervous/anxious.          [facility]  The following portions of the patient's history were reviewed and updated as appropriate: allergies, current medications, past family history, past medical history, past social history, past surgical history and problem list.     Past Medical History:   Diagnosis Date   • Chronic back pain       History reviewed. No pertinent surgical history.   No Known Allergies  "  Social History     Socioeconomic History   • Marital status:    Tobacco Use   • Smoking status: Former Smoker     Packs/day: 0.00     Years: 0.00     Pack years: 0.00     Types: Cigarettes     Quit date: 3/2/2018     Years since quittin.6   • Smokeless tobacco: Current User   • Tobacco comment: just when drinking   Substance and Sexual Activity   • Alcohol use: Yes     Comment: Quit 3 days ago.  Sometims would drink 5-10 drinks liquor or beer.   • Drug use: No   • Sexual activity: Defer     Family History   Problem Relation Age of Onset   • Diabetes Mother    • Heart disease Maternal Grandfather    • Alcohol abuse Maternal Grandfather    • Cancer Father    • Alcohol abuse Father             Current Outpatient Medications:   •  finasteride (PROPECIA) 1 MG tablet, Take 1 tablet by mouth Daily., Disp: 30 tablet, Rfl: 2  •  DULoxetine (CYMBALTA) 60 MG capsule, Take 1 capsule by mouth Daily., Disp: 30 capsule, Rfl: 11  •  Lidocaine-Menthol 4-1 % gel, Apply 1 application topically Every 6 (Six) Hours As Needed (muscle aches)., Disp: 120 g, Rfl: 2  •  sildenafil (REVATIO) 20 MG tablet, Take 3 tablets by mouth Daily As Needed (erectile dysfunction). Take 1 hour prior to need, Disp: 90 tablet, Rfl: 1  •  traZODone (DESYREL) 50 MG tablet, Take 1 tablet by mouth At Night As Needed for Sleep., Disp: 30 tablet, Rfl: 11    VITALS:  /84   Pulse 88   Temp 97.3 °F (36.3 °C)   Ht 172.7 cm (68\")   Wt 84.4 kg (186 lb)   SpO2 99%   BMI 28.28 kg/m²     Physical Exam  Vitals signs reviewed.   HENT:      Head: Normocephalic.   Eyes:      Extraocular Movements: Extraocular movements intact.      Pupils: Pupils are equal, round, and reactive to light.   Cardiovascular:      Rate and Rhythm: Normal rate and regular rhythm.      Heart sounds: Normal heart sounds.   Pulmonary:      Effort: Pulmonary effort is normal. No respiratory distress.      Breath sounds: Normal breath sounds.   Skin:     General: Skin is warm and " dry.   Neurological:      Mental Status: He is alert and oriented to person, place, and time. Mental status is at baseline.   Psychiatric:         Mood and Affect: Mood normal.         Behavior: Behavior normal.         LABS  Results for orders placed or performed in visit on 09/28/20   Comprehensive Metabolic Panel    Specimen: Blood   Result Value Ref Range    Glucose 86 65 - 99 mg/dL    BUN 10 6 - 20 mg/dL    Creatinine 1.05 0.76 - 1.27 mg/dL    Sodium 133 (L) 136 - 145 mmol/L    Potassium 4.4 3.5 - 5.2 mmol/L    Chloride 99 98 - 107 mmol/L    CO2 22.8 22.0 - 29.0 mmol/L    Calcium 9.8 8.6 - 10.5 mg/dL    Total Protein 8.0 6.0 - 8.5 g/dL    Albumin 4.30 3.50 - 5.20 g/dL    ALT (SGPT) 55 (H) 1 - 41 U/L    AST (SGOT) 42 (H) 1 - 40 U/L    Alkaline Phosphatase 35 (L) 39 - 117 U/L    Total Bilirubin 0.5 0.0 - 1.2 mg/dL    eGFR Non African Amer 80 >60 mL/min/1.73    Globulin 3.7 gm/dL    A/G Ratio 1.2 g/dL    BUN/Creatinine Ratio 9.5 7.0 - 25.0    Anion Gap 11.2 5.0 - 15.0 mmol/L   Hemoglobin A1c    Specimen: Blood   Result Value Ref Range    Hemoglobin A1C 5.67 (H) 4.80 - 5.60 %   TSH Rfx On Abnormal To Free T4    Specimen: Blood   Result Value Ref Range    TSH 2.040 0.270 - 4.200 uIU/mL   Iron    Specimen: Blood   Result Value Ref Range    Iron 29 (L) 59 - 158 mcg/dL   Testosterone, free, total    Specimen: Blood   Result Value Ref Range    Testosterone, Total >1500 (H) 264 - 916 ng/dL    Testosterone, Free >50.0 (H) 8.7 - 25.1 pg/mL   Vitamin B12    Specimen: Blood   Result Value Ref Range    Vitamin B-12 405 211 - 946 pg/mL   Vitamin D 25 Hydroxy    Specimen: Blood   Result Value Ref Range    25 Hydroxy, Vitamin D 37.8 30.0 - 100.0 ng/ml   Prolactin    Specimen: Blood   Result Value Ref Range    Prolactin 39.20 (H) 4.04 - 15.20 ng/mL   CBC Auto Differential    Specimen: Blood   Result Value Ref Range    WBC 16.20 (H) 3.40 - 10.80 10*3/mm3    RBC 4.85 4.14 - 5.80 10*6/mm3    Hemoglobin 14.7 13.0 - 17.7 g/dL     Hematocrit 44.6 37.5 - 51.0 %    MCV 92.0 79.0 - 97.0 fL    MCH 30.3 26.6 - 33.0 pg    MCHC 33.0 31.5 - 35.7 g/dL    RDW 15.3 12.3 - 15.4 %    RDW-SD 51.2 37.0 - 54.0 fl    MPV 10.4 6.0 - 12.0 fL    Platelets 610 (H) 140 - 450 10*3/mm3    Neutrophil % 74.0 42.7 - 76.0 %    Lymphocyte % 16.0 (L) 19.6 - 45.3 %    Monocyte % 7.8 5.0 - 12.0 %    Eosinophil % 0.9 0.3 - 6.2 %    Basophil % 0.8 0.0 - 1.5 %    Immature Grans % 0.5 0.0 - 0.5 %    Neutrophils, Absolute 11.99 (H) 1.70 - 7.00 10*3/mm3    Lymphocytes, Absolute 2.59 0.70 - 3.10 10*3/mm3    Monocytes, Absolute 1.27 (H) 0.10 - 0.90 10*3/mm3    Eosinophils, Absolute 0.14 0.00 - 0.40 10*3/mm3    Basophils, Absolute 0.13 0.00 - 0.20 10*3/mm3    Immature Grans, Absolute 0.08 (H) 0.00 - 0.05 10*3/mm3    nRBC 0.0 0.0 - 0.2 /100 WBC         ASSESSMENT/PLAN  Diagnoses and all orders for this visit:    1. Fatigue, unspecified type (Primary)  -     Testosterone, Free, Total; Future  -     Prolactin; Future  -     NATALIA; Future  -     Mononucleosis Screen; Future  -     Sedimentation Rate; Future  -     Lyme, Total Antibody Test / Reflex; Future    2. Insomnia, unspecified type  Comments:  Practice good sleep hygeine (going to bed same time, limiting caffeine and heavy meals prior to bed, avoid naps)  Orders:  -     traZODone (DESYREL) 100 MG tablet; Take 1 tablet by mouth Every Night.  Dispense: 30 tablet; Refill: 5    3. Erectile dysfunction, unspecified erectile dysfunction type  -     sildenafil (REVATIO) 20 MG tablet; Take 3 tablets by mouth Daily As Needed (erectile dysfunction). Take 1 hour prior to need  Dispense: 90 tablet; Refill: 1    check additional labs regarding fatigue workup. Keep appt with endocrine. If nothing else noted on labs, will obtain chest xray and pursue other speciality eval for fatigue such as sleep med.    I discussed the patients findings and my recommendations with patient.  Patient was encouraged to keep me informed of any acute changes, lack  of improvement, or any new concerning symptoms.  Patient voiced understanding of all instructions and denied further questions.      FOLLOW-UP  Return if symptoms worsen or fail to improve.    Electronically signed by:    YEE Hardy  11/03/2020    EMR Dragon/Transcription Disclaimer:  Much of this encounter note is an electronic transcription/translation of spoken language to printed text.  The electronic translation of spoken language may permit erroneous, or at times, nonsensical words or phrases to be inadvertently transcribed.  Although I have reviewed the note for such errors, some may still exist

## 2020-11-04 LAB
ACTH PLAS-MCNC: 11.8 PG/ML (ref 7.2–63.3)
ERYTHROCYTE [SEDIMENTATION RATE] IN BLOOD: 19 MM/HR (ref 0–15)

## 2020-11-05 LAB
ANA SER QL: NEGATIVE
B BURGDOR IGG+IGM SER-ACNC: <0.91 ISR (ref 0–0.9)

## 2020-11-05 NOTE — TELEPHONE ENCOUNTER
Caller: Sunny Drummond    Relationship: Self    Best call back number: [phone]     What medication are you requesting:   REQUESTING TADALAFIL - BECAUSE THE INSURANCE DOES COVER IT AND PATIENT WAS INSTRUCTED TO CALL BACK AND ADVISE SO IT COULD GET CALLED IN INSTEAD OF THE SILDENAFIL        If a prescription is needed, what is your preferred pharmacy and phone number: [facility] [address] [place] [address] [address] [facility] [phone] [facility] [phone] [facility]     PLEASE CALL IF ANY QUESTION PLEASE CALL PATIENT -[phone]

## 2020-11-06 LAB
TESTOST FREE SERPL-MCNC: >50 PG/ML (ref 8.7–25.1)
TESTOST SERPL-MCNC: >1500 NG/DL (ref 264–916)

## 2020-11-07 NOTE — TELEPHONE ENCOUNTER
Please return call to pt. The daily dose of Tadalafil is 5mg; the 20mg (he referenced in his msg) if for prn use only not daily.   Please let me know which he prefers.

## 2020-11-08 RX ORDER — TADALAFIL 5 MG/1
5 TABLET ORAL DAILY
Qty: 90 TABLET | Refills: 1 | Status: SHIPPED | OUTPATIENT
Start: 2020-11-08 | End: 2021-02-02

## 2020-11-09 NOTE — TELEPHONE ENCOUNTER
Let pt know I sent rx for tadalafil 5mg daily.  Do not still any sildenafil.  Remind pt if ever develops erection lasting longer than 4 hours this is considered a medical emergency and should seek emergency care.

## 2020-11-09 NOTE — TELEPHONE ENCOUNTER
Let pt know tadalafil 5 mg sent in to [facility] and to discontinue the sildenafil 20 mg. Pt verbalized understanding

## 2020-11-12 LAB — CORTIS F SERPL-MCNC: 0.53 UG/DL

## 2020-11-17 DIAGNOSIS — L64.9 MALE PATTERN BALDNESS: ICD-10-CM

## 2020-11-17 RX ORDER — FINASTERIDE 1 MG/1
1 TABLET, FILM COATED ORAL DAILY
Qty: 30 TABLET | Refills: 2 | Status: SHIPPED | OUTPATIENT
Start: 2020-11-17 | End: 2021-05-14 | Stop reason: SDUPTHER

## 2020-11-17 NOTE — TELEPHONE ENCOUNTER
Last Office Visit: 11/3/20  Next Office Visit:1/22/20    Labs completed in past 6 months? yes  Labs completed in past year? yes    Last Refill Date:8/19/20  Quantity:30  Refills:2    [facility]

## 2020-11-18 DIAGNOSIS — L64.9 MALE PATTERN BALDNESS: ICD-10-CM

## 2020-11-19 RX ORDER — FINASTERIDE 1 MG/1
TABLET, FILM COATED ORAL
Qty: 30 TABLET | Refills: 0 | OUTPATIENT
Start: 2020-11-19

## 2020-11-25 ENCOUNTER — OFFICE VISIT (OUTPATIENT)
Dept: ENDOCRINOLOGY | Facility: CLINIC | Age: 35
End: 2020-11-25

## 2020-11-25 ENCOUNTER — TELEPHONE (OUTPATIENT)
Dept: ENDOCRINOLOGY | Facility: CLINIC | Age: 35
End: 2020-11-25

## 2020-11-25 VITALS
BODY MASS INDEX: 28.04 KG/M2 | HEART RATE: 84 BPM | HEIGHT: 68 IN | SYSTOLIC BLOOD PRESSURE: 128 MMHG | DIASTOLIC BLOOD PRESSURE: 70 MMHG | WEIGHT: 185 LBS

## 2020-11-25 DIAGNOSIS — E22.1 HYPERPROLACTINEMIA (HCC): Primary | ICD-10-CM

## 2020-11-25 DIAGNOSIS — E29.0 MALE HYPERTESTOSTERONEMIA: ICD-10-CM

## 2020-11-25 DIAGNOSIS — N62 GYNECOMASTIA: ICD-10-CM

## 2020-11-25 PROCEDURE — 99204 OFFICE O/P NEW MOD 45 MIN: CPT | Performed by: INTERNAL MEDICINE

## 2020-11-25 RX ORDER — CABERGOLINE 0.5 MG/1
0.5 TABLET ORAL 2 TIMES WEEKLY
Qty: 8 TABLET | Refills: 11 | Status: SHIPPED | OUTPATIENT
Start: 2020-11-26 | End: 2021-05-14 | Stop reason: SDUPTHER

## 2020-11-25 NOTE — ASSESSMENT & PLAN NOTE
This is almost certainly from illicit use of anabolic steroids. I implored him to stop doing this and to stop the supplements.

## 2020-11-25 NOTE — ASSESSMENT & PLAN NOTE
I suspect this mildly high prolactin is from the trazodone or from some of the supplements he is taking. It could be from a pituitary lesion that is so small that it would not be seen on mri. We can use dostinex for 6 months to see if lowering the prolactin helps him feel better

## 2020-11-25 NOTE — PROGRESS NOTES
Office Note      Date: 2020  Patient Name: Sunny Drummond  MRN: [id number]  : 1985    Cc: fatigue    History of Present Illness:   Sunny Drummond is a 35 y.o. male who presents for- fatigue.  Duration- 6  Months at least  Modifying factors- he has been trying supplements including illicit use of intramuscular testosterone to feel better. He took cabergoline as well to lower his prolactin and he states that helped him feel better.  Severity- stated he can barely function.  Associated conditions- insomnia and depression. He has recently started cymbalta and trazadone but was not on those when his labs were done.    DUE TO SEVERAL MONTHS OF FATIGUE HE STATES HE TOOK 3 IM SHOTS OF TESTOSTERONE AND REVIEW OF OLD RECORDS SHOWS THAT HIS TESTOSTERONE LEVELS ARE HIGHER THAN THE LAB CAN MEASURE. THE SHOTS DID NOT HELP. HE IS ALSO USING UNIDENTIFIED SUPPLEMENTS TO HELP HIM WITH HIS WORKOUTS.. AS PART OF THE EVALUATION, HE WAS FOUND TO HAVE A HIGH PROLACTIN ABOUT 40 AND NORMAL TFT'S. MRI OF PITUITARY WAS NORMAL  HE HAS LONG HX OF GYNECOMASTIA BUT NOTES NO BREAST DISCHARGE    Last A1c:  Hemoglobin A1C   Date Value Ref Range Status   2020 5.67 (H) 4.80 - 5.60 % Final           Subjective        Review of Systems:   Review of Systems   Constitutional: Positive for fatigue.   HENT: Negative.    Eyes: Negative.    Respiratory: Negative.    Cardiovascular: Negative.    Gastrointestinal: Negative.    Endocrine: Negative.    Genitourinary: Negative.    Musculoskeletal: Negative.    Allergic/Immunologic: Negative.    Neurological: Positive for dizziness and tremors.   Psychiatric/Behavioral: Positive for dysphoric mood and sleep disturbance. The patient is nervous/anxious.        The following portions of the patient's history were reviewed and updated as appropriate: allergies, current medications, past family history, past medical history, past social history, past surgical history and problem list.    Objective  "    Visit Vitals  /70 (BP Location: Right arm, Patient Position: Sitting, Cuff Size: Adult)   Pulse 84   Ht 172.7 cm (68\")   Wt 83.9 kg (185 lb)   BMI 28.13 kg/m²       Labs:    CMP  Lab Results   Component Value Date    GLUCOSE 86 09/28/2020    BUN 10 09/28/2020    CREATININE 1.05 09/28/2020    EGFRIFNONA 80 09/28/2020    BCR 9.5 09/28/2020    K 4.4 09/28/2020    CO2 22.8 09/28/2020    CALCIUM 9.8 09/28/2020    AST 42 (H) 09/28/2020    ALT 55 (H) 09/28/2020        CBC w/DIFF  Lab Results   Component Value Date    WBC 16.20 (H) 09/28/2020    RBC 4.85 09/28/2020    HGB 14.7 09/28/2020    HCT 44.6 09/28/2020    MCV 92.0 09/28/2020    MCH 30.3 09/28/2020    MCHC 33.0 09/28/2020    RDW 15.3 09/28/2020    RDWSD 51.2 09/28/2020    MPV 10.4 09/28/2020     (H) 09/28/2020    NEUTRORELPCT 74.0 09/28/2020    LYMPHORELPCT 16.0 (L) 09/28/2020    MONORELPCT 7.8 09/28/2020    EOSRELPCT 0.9 09/28/2020    BASORELPCT 0.8 09/28/2020    AUTOIGPER 0.5 09/28/2020    NEUTROABS 11.99 (H) 09/28/2020    LYMPHSABS 2.59 09/28/2020    MONOSABS 1.27 (H) 09/28/2020    EOSABS 0.14 09/28/2020    BASOSABS 0.13 09/28/2020    AUTOIGNUM 0.08 (H) 09/28/2020    NRBC 0.0 09/28/2020       Physical Exam:  Physical Exam  Vitals signs reviewed.   Constitutional:       Appearance: Normal appearance. He is normal weight.      Comments: VERY MUSCULAR    HENT:      Head: Normocephalic and atraumatic.   Eyes:      Extraocular Movements: Extraocular movements intact.      Pupils: Pupils are equal, round, and reactive to light.   Neck:      Musculoskeletal: Normal range of motion and neck supple. No neck rigidity.   Cardiovascular:      Rate and Rhythm: Normal rate and regular rhythm.      Pulses: Normal pulses.   Pulmonary:      Effort: Pulmonary effort is normal.      Breath sounds: Normal breath sounds.   Musculoskeletal: Normal range of motion.      Comments: + gynecomastia   Lymphadenopathy:      Cervical: No cervical adenopathy.   Skin:     " General: Skin is warm and dry.      Comments: Red faced   Neurological:      General: No focal deficit present.      Mental Status: He is alert. Mental status is at baseline.   Psychiatric:         Mood and Affect: Mood normal.         Thought Content: Thought content normal.         Judgment: Judgment normal.          Assessment / Plan      Assessment & Plan:  Problem List Items Addressed This Visit        Endocrine    Hyperprolactinemia (CMS/HCC) - Primary    Current Assessment & Plan     I suspect this mildly high prolactin is from the trazodone or from some of the supplements he is taking. It could be from a pituitary lesion that is so small that it would not be seen on mri. 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 I implored him to stop doing this and to stop the supplements.         Gynecomastia    Current Assessment & Plan     Caused by excessive testosterone                Quentin Cassidy MD   11/25/2020

## 2020-11-25 NOTE — TELEPHONE ENCOUNTER
Patient stated the pharmacy said his insurance will not cover the medication that was prescribed. Pharmacy stated that Dr. Cassidy should call them to get this worked out. Pharmacy number is [phone]. Please advise

## 2020-11-30 ENCOUNTER — TELEPHONE (OUTPATIENT)
Dept: ENDOCRINOLOGY | Facility: CLINIC | Age: 35
End: 2020-11-30

## 2021-02-02 ENCOUNTER — OFFICE VISIT (OUTPATIENT)
Dept: INTERNAL MEDICINE | Facility: CLINIC | Age: 36
End: 2021-02-02

## 2021-02-02 VITALS
OXYGEN SATURATION: 98 % | SYSTOLIC BLOOD PRESSURE: 142 MMHG | BODY MASS INDEX: 29.86 KG/M2 | HEART RATE: 92 BPM | WEIGHT: 197 LBS | TEMPERATURE: 97.7 F | DIASTOLIC BLOOD PRESSURE: 82 MMHG | HEIGHT: 68 IN

## 2021-02-02 DIAGNOSIS — N52.9 ERECTILE DYSFUNCTION, UNSPECIFIED ERECTILE DYSFUNCTION TYPE: Primary | ICD-10-CM

## 2021-02-02 DIAGNOSIS — A63.0 GENITAL WARTS: ICD-10-CM

## 2021-02-02 PROCEDURE — 99214 OFFICE O/P EST MOD 30 MIN: CPT | Performed by: NURSE PRACTITIONER

## 2021-02-02 RX ORDER — TADALAFIL 20 MG/1
20 TABLET ORAL DAILY PRN
Qty: 30 TABLET | Refills: 1 | Status: SHIPPED | OUTPATIENT
Start: 2021-02-02 | End: 2021-05-14 | Stop reason: SDUPTHER

## 2021-02-02 RX ORDER — IMIQUIMOD 12.5 MG/.25G
CREAM TOPICAL 3 TIMES WEEKLY
Qty: 24 PACKET | Refills: 0 | Status: SHIPPED | OUTPATIENT
Start: 2021-02-03 | End: 2021-06-02

## 2021-02-02 NOTE — PROGRESS NOTES
"  Chief Complaint   Patient presents with   • Erectile Dysfunction   • Fatigue       History of Present Illness    35 y.o.male presents for ED and fatigue.  Fatigue is some better on the caborgoline. Has FU appt in March with endocrine.  ED continues to be an issue. Has decreased his propecia to half tab to see if helps. Has been taking the daily dose cialis and not much difference. Wants to try going back to the prn dose cialis.   Wife positive hpv; he has a couple spot on penile area that he would like to have treated for possible genital wart.     ROS: any positive referenced above.   Other ROS negative.      [facility] [place] [place]  The following portions of the patient's history were reviewed and updated as appropriate: allergies, current medications, past family history, past medical history, past social history, past surgical history and problem list.     Past Medical History:   Diagnosis Date   • Chronic back pain       No Known Allergies   Social History     Tobacco Use   • Smoking status: Former Smoker     Packs/day: 0.00     Years: 0.00     Pack years: 0.00     Types: Cigarettes     Quit date: 3/2/2018     Years since quittin.9   • Smokeless tobacco: Current User   • Tobacco comment: just when drinking   Substance Use Topics   • Alcohol use: Not Currently     Comment: Quit 3 days ago.  Sometims would drink 5-10 drinks liquor or beer.   • Drug use: No         Current Outpatient Medications:   •  cabergoline (DOSTINEX) 0.5 MG tablet, Take 1 tablet by mouth 2 (Two) Times a Week., Disp: 8 tablet, Rfl: 11  •  finasteride (PROPECIA) 1 MG tablet, Take 1 tablet by mouth Daily., Disp: 30 tablet, Rfl: 2  •  tadalafil (CIALIS) 5 MG tablet, Take 1 tablet by mouth Daily., Disp: 90 tablet, Rfl: 1  •  traZODone (DESYREL) 100 MG tablet, Take 1 tablet by mouth Every Night., Disp: 30 tablet, Rfl: 5    VITALS:  /82   Pulse 92   Temp 97.7 °F (36.5 °C)   Ht 172.7 cm (68\")   Wt 89.4 kg (197 lb)   SpO2 98%   BMI 29.95 kg/m² "     Physical Exam  Vitals signs reviewed.   HENT:      Head: Normocephalic.   Cardiovascular:      Rate and Rhythm: Normal rate and regular rhythm.      Heart sounds: Normal heart sounds.   Pulmonary:      Effort: Pulmonary effort is normal. No respiratory distress.   Skin:     General: Skin is warm and dry.   Neurological:      Mental Status: He is alert and oriented to person, place, and time.   Psychiatric:         Mood and Affect: Mood normal.     deferred     Result Review :            Assessment and Plan    Diagnoses and all orders for this visit:    1. Erectile dysfunction, unspecified erectile dysfunction type (Primary)  -     tadalafil (CIALIS) 20 MG tablet; Take 1 tablet by mouth Daily As Needed for Erectile Dysfunction.  Dispense: 30 tablet; Refill: 1  If has erection lasting longer than 4 hrs seek emergency care.  2. Genital warts  -     imiquimod (ALDARA) 5 % cream; Apply  topically to the appropriate area as directed 3 (Three) Times a Week.  Dispense: 24 packet; Refill: 0  Educated pt for use. Apply only to wart area; avoid putting on good skin. Can apply vaseline on skin around wart lesion then apply rx to lesion; helps to protect good skin around lesion.    keep FU with endocrine.      Follow Up   Return if symptoms worsen or fail to improve.      I discussed the patients findings and my recommendations with patient.  Patient was encouraged to keep me informed of any acute changes, lack of improvement, or any new concerning symptoms.  Patient voiced understanding of all instructions and denied further questions.    Electronically signed by:    YEE Hardy  02/02/2021    EMR Dragon/Transcription Disclaimer:  Much of this encounter note is an electronic transcription/translation of spoken language to printed text.  The electronic translation of spoken language may permit erroneous, or at times, nonsensical words or phrases to be inadvertently transcribed.  Although I have reviewed the  note for such errors, some may still exist

## 2021-03-02 ENCOUNTER — LAB (OUTPATIENT)
Dept: LAB | Facility: HOSPITAL | Age: 36
End: 2021-03-02

## 2021-03-02 ENCOUNTER — OFFICE VISIT (OUTPATIENT)
Dept: ENDOCRINOLOGY | Facility: CLINIC | Age: 36
End: 2021-03-02

## 2021-03-02 VITALS
DIASTOLIC BLOOD PRESSURE: 74 MMHG | BODY MASS INDEX: 29.86 KG/M2 | SYSTOLIC BLOOD PRESSURE: 118 MMHG | TEMPERATURE: 99.3 F | HEART RATE: 105 BPM | OXYGEN SATURATION: 97 % | WEIGHT: 197 LBS | HEIGHT: 68 IN

## 2021-03-02 DIAGNOSIS — E29.0 MALE HYPERTESTOSTERONEMIA: ICD-10-CM

## 2021-03-02 DIAGNOSIS — E29.0 MALE HYPERTESTOSTERONEMIA: Primary | ICD-10-CM

## 2021-03-02 DIAGNOSIS — E22.1 HYPERPROLACTINEMIA (HCC): ICD-10-CM

## 2021-03-02 DIAGNOSIS — N62 GYNECOMASTIA: ICD-10-CM

## 2021-03-02 DIAGNOSIS — R53.82 CHRONIC FATIGUE: ICD-10-CM

## 2021-03-02 PROCEDURE — 99214 OFFICE O/P EST MOD 30 MIN: CPT | Performed by: INTERNAL MEDICINE

## 2021-03-02 PROCEDURE — 84403 ASSAY OF TOTAL TESTOSTERONE: CPT

## 2021-03-02 PROCEDURE — 36415 COLL VENOUS BLD VENIPUNCTURE: CPT

## 2021-03-02 PROCEDURE — 84146 ASSAY OF PROLACTIN: CPT

## 2021-03-02 NOTE — ASSESSMENT & PLAN NOTE
He continues to use illicit testoterone. I cautioned against that. If he is truly hypogonadal then treatment should be medically supervised

## 2021-03-02 NOTE — PROGRESS NOTES
"     Office Note      Date: 2021  Patient Name: Sunny Drummond  MRN: [id number]  : 1985    CC: gynecomasti    History of Present Illness:   Sunny Drummond is a 35 y.o. male who presents for gynecomastia.  He had very high levels  Of testosterone because  He was using illicit testosterone injectiosn  That he was getting at the gym.  He  Last used  About a month ago.  He does not feel it helped much  He does not feel the cabergoline has made any difference.    He has to take 6 meds each night to sleep and gets 3 hours of sleep per night.        Subjective          Review of Systems:   Review of Systems   Constitutional: Negative.    HENT: Negative.    Eyes: Negative.    Respiratory: Negative.    All other systems reviewed and are negative.      The following portions of the patient's history were reviewed and updated as appropriate: allergies, current medications, past family history, past medical history, past social history, past surgical history and problem list.    Objective     Visit Vitals  /74   Pulse 105   Temp 99.3 °F (37.4 °C) (Infrared)   Ht 172.7 cm (68\")   Wt 89.4 kg (197 lb)   SpO2 97%   BMI 29.95 kg/m²       Labs:    CBC w/DIFF  Lab Results   Component Value Date    WBC 16.20 (H) 2020    RBC 4.85 2020    HGB 14.7 2020    HCT 44.6 2020    MCV 92.0 2020    MCH 30.3 2020    MCHC 33.0 2020    RDW 15.3 2020    RDWSD 51.2 2020    MPV 10.4 2020     (H) 2020    NEUTRORELPCT 74.0 2020    LYMPHORELPCT 16.0 (L) 2020    MONORELPCT 7.8 2020    EOSRELPCT 0.9 2020    BASORELPCT 0.8 2020    AUTOIGPER 0.5 2020    NEUTROABS 11.99 (H) 2020    LYMPHSABS 2.59 2020    MONOSABS 1.27 (H) 2020    EOSABS 0.14 2020    BASOSABS 0.13 2020    AUTOIGNUM 0.08 (H) 2020    NRBC 0.0 2020       T4  No results found for: FREET4    TSH  No results found for: TSHBASE "     Physical Exam:  Physical Exam  Vitals signs reviewed.   Constitutional:       Appearance: Normal appearance.   Neurological:      Mental Status: He is alert.   Psychiatric:         Thought Content: Thought content normal.         Judgment: Judgment normal.      Comments: Depressed mood         Assessment / Plan      Assessment & Plan:  Problem List Items Addressed This Visit        Other    Hyperprolactinemia (CMS/HCC)    Current Assessment & Plan     On cabergoline needs re- assessment          Relevant Medications    cabergoline (DOSTINEX) 0.5 MG tablet    Other Relevant Orders    Prolactin    Male hypertestosteronemia - Primary    Current Assessment & Plan     He continues to use illicit testoterone. I cautioned against that. If he is truly hypogonadal then treatment should be medically supervised          Relevant Orders    Testosterone    Gynecomastia    Chronic fatigue    Current Assessment & Plan     Given his sleep patterns I wonder if this is  Related to a sleepo disorder                 Quentin Cassidy MD   03/02/2021

## 2021-03-03 LAB
PROLACTIN SERPL-MCNC: 0.9 NG/ML (ref 4.04–15.2)
TESTOST SERPL-MCNC: >1500 NG/DL (ref 249–836)

## 2021-03-24 ENCOUNTER — TELEPHONE (OUTPATIENT)
Dept: ENDOCRINOLOGY | Facility: CLINIC | Age: 36
End: 2021-03-24

## 2021-03-24 NOTE — TELEPHONE ENCOUNTER
Approvedtoday  Approved. This drug has been approved. Approved quantity: 8 tablets per 28 day(s). You may fill up to a 31 day supply at a retail pharmacy. You may fill up to a 92 day supply for maintenance drugs. Please call the pharmacy to process your prescription claim.  Drug  Cabergoline 0.5MG tablets  Form  [employer] Electronic Prior Authorization Request Form

## 2021-04-17 DIAGNOSIS — N52.9 ERECTILE DYSFUNCTION, UNSPECIFIED ERECTILE DYSFUNCTION TYPE: ICD-10-CM

## 2021-04-17 RX ORDER — TADALAFIL 20 MG/1
TABLET ORAL
Qty: 30 TABLET | Refills: 0 | OUTPATIENT
Start: 2021-04-17

## 2021-05-04 ENCOUNTER — OFFICE VISIT (OUTPATIENT)
Dept: INTERNAL MEDICINE | Facility: CLINIC | Age: 36
End: 2021-05-04

## 2021-05-04 DIAGNOSIS — J06.9 UPPER RESPIRATORY TRACT INFECTION, UNSPECIFIED TYPE: Primary | ICD-10-CM

## 2021-05-04 DIAGNOSIS — M54.2 CHRONIC NECK PAIN: ICD-10-CM

## 2021-05-04 DIAGNOSIS — G89.29 CHRONIC NECK PAIN: ICD-10-CM

## 2021-05-04 PROCEDURE — 99442 PR PHYS/QHP TELEPHONE EVALUATION 11-20 MIN: CPT | Performed by: NURSE PRACTITIONER

## 2021-05-04 NOTE — PROGRESS NOTES
Chief Complaint   Patient presents with   • Sinusitis   • Neck Pain     chronic       History of Present Illness      Sunny Drummond is a 36 y.o. male who presents today for a telephone visit during the Covid-19 pandemic/federally declared [facility] public health emergency for sinusitis and neck pain. The use of a video visit has been reviewed with the patient and verbal informed consent has been obtained.     Sinusitis  This is a new problem. Episode onset: 2 days ago. Associated symptoms include congestion and neck pain. Pertinent negatives include no chills, coughing, diaphoresis, ear pain, headaches, hoarse voice, shortness of breath, sinus pressure, sneezing, sore throat or swollen glands. ((+) fatigue, rhinnorrhea) Past treatments include nothing. The treatment provided no relief.   Neck Pain   This is a chronic problem. Episode onset: several years. The problem occurs constantly. The problem has been unchanged. The pain is associated with a sleep position. Pain location: posterior neck. The quality of the pain is described as aching. The pain is at a severity of 7/10. The pain is moderate. Nothing aggravates the symptoms. Stiffness is present all day. Pertinent negatives include no chest pain, fever, headaches, numbness, paresis, tingling, trouble swallowing, visual change or weakness. He has tried nothing for the symptoms. The treatment provided no relief.       Review of Systems  Review of Systems   Constitutional: Positive for fatigue. Negative for appetite change, chills, diaphoresis and fever.   HENT: Positive for congestion, postnasal drip and rhinorrhea. Negative for ear pain, hoarse voice, sinus pressure, sneezing, sore throat, swollen glands and trouble swallowing.    Eyes: Negative for visual disturbance.   Respiratory: Negative for cough, chest tightness, shortness of breath and wheezing.    Cardiovascular: Negative for chest pain, palpitations and leg swelling.   Gastrointestinal: Negative for  abdominal pain, diarrhea and nausea.   Musculoskeletal: Positive for neck pain and neck stiffness.   Neurological: Negative for dizziness, tingling, weakness, light-headedness, numbness and headache.   Psychiatric/Behavioral: Negative for sleep disturbance.         [facility]  The following portions of the patient's history were reviewed and updated as appropriate: allergies, current medications, past family history, past medical history, past social history, past surgical history and problem list.     Past Medical History:   Diagnosis Date   • Chronic back pain      Social History     Tobacco Use   • Smoking status: Current Some Day Smoker     Packs/day: 0.00     Years: 0.00     Pack years: 0.00     Types: Cigarettes     Last attempt to quit: 3/2/2018     Years since quitting: 3.1   • Smokeless tobacco: Current User     Types: Snuff   • Tobacco comment: just when drinking   Substance Use Topics   • Alcohol use: Yes     Comment: occasionally     Past Surgical History:   Procedure Laterality Date   • FINGER SURGERY Right      Family History   Problem Relation Age of Onset   • Diabetes Mother    • Heart disease Maternal Grandfather    • Alcohol abuse Maternal Grandfather    • Cancer Father    • Alcohol abuse Father      No Known Allergies      Current Outpatient Medications:   •  cabergoline (DOSTINEX) 0.5 MG tablet, Take 1 tablet by mouth 2 (Two) Times a Week., Disp: 8 tablet, Rfl: 11  •  finasteride (PROPECIA) 1 MG tablet, Take 1 tablet by mouth Daily., Disp: 30 tablet, Rfl: 2  •  imiquimod (ALDARA) 5 % cream, Apply  topically to the appropriate area as directed 3 (Three) Times a Week., Disp: 24 packet, Rfl: 0  •  tadalafil (CIALIS) 20 MG tablet, Take 1 tablet by mouth Daily As Needed for Erectile Dysfunction., Disp: 30 tablet, Rfl: 1  •  traZODone (DESYREL) 100 MG tablet, Take 1 tablet by mouth Every Night., Disp: 30 tablet, Rfl: 5    Objective   Vitals: unable to obtain due to audio only encounter      Physical Exam:  unable to assess due to audio only encounter         Assessment and Plan    Diagnoses and all orders for this visit:    1. Upper respiratory tract infection, unspecified type (Primary)  Discussed Viral vs. Bacterial etiology. Signs and symptoms consistent with viral infection at this time. Advised in symptomatic relief with recommended OTC medications or prescribed medications this visit. Encouraged rest and hydration. Salt water gargles/chloraseptic spray for sore throat prn. Tylenol/Ibuprofen prn for fevers, HA, body aches. Advised in hygiene measures and time frame of 7-10 days for resolution of symptoms. Follow-up with PCP for new, worsening, or persistent symptoms.    2. Chronic neck pain  -     Ambulatory Referral to Physical Therapy Evaluate and treat  Refer to PT per patient request. For acute pain, rest, intermittent application of ice/heat, massage therapy, analgesics Recommend a home neck/back care exercise regimen with flexion and extension stretching maneuvers. Proper lifting with avoidance of heavy lifting, pushing, or pulling discussed. Call or return to clinic prn if these symptoms worsen or fail to improve as anticipated.        Today I have spent a total of 15 minutes on a telephone encounter with Sunny Drummond. During this time, a total of 15 minutes was spent in direct discussion with patient regarding pertinent diagnoses, treatment modalities, medications, and follow-up. Education includes verbal discussion on the nature of the diagnoses including treatment, complications, implications, and management. Indications for further evaluation and work-up provided.      Follow Up   Return if symptoms worsen or fail to improve.    Plan of care reviewed with patient at conclusion of today's visit. Patient education was provided regarding diagnosis, management, and prescribed or recommended OTC medications. Patient was informed to notify office of any new, worsening, or persistent symptoms. Patient  verbalized understanding and agreement with plan of care.     Electronically Signed By:  YEE Britt  05/04/2021    EMR Dragon/Transcription Disclaimer:  Please note that portions of this encounter note were completed using electronic transcription/translation of spoken language to printed text.  The electronic transcription/translation of spoken language may permit erroneous, or at times, nonsensical words or phrases to be inadvertently transcribed.  Although I have reviewed the note for such errors, some may still exist in this documentation.

## 2021-05-07 ENCOUNTER — TELEMEDICINE (OUTPATIENT)
Dept: INTERNAL MEDICINE | Facility: CLINIC | Age: 36
End: 2021-05-07

## 2021-05-07 DIAGNOSIS — J06.9 ACUTE URI: Primary | ICD-10-CM

## 2021-05-07 PROCEDURE — 99442 PR PHYS/QHP TELEPHONE EVALUATION 11-20 MIN: CPT | Performed by: NURSE PRACTITIONER

## 2021-05-07 NOTE — PROGRESS NOTES
Chief Complaint   Patient presents with   • Nasal Congestion       History of Present Illness      Sunny Drummond is a 36 y.o. male who presents today for an audio/video visit during the Covid-19 pandemic/federally declared state of public health emergency for nasal congestion. The use of a video visit has been reviewed with the patient and verbal informed consent has been obtained.       Sinusitis  This is a recurrent problem. Episode onset: 5 days ago. The problem has been gradually improving since onset. There has been no fever. He is experiencing no pain. Associated symptoms include congestion, sinus pressure and sneezing. Pertinent negatives include no chills, coughing, diaphoresis, ear pain, headaches, hoarse voice, neck pain, shortness of breath, sore throat or swollen glands. ((-) myalgias) Past treatments include nothing. The treatment provided no relief.     Requests work excuse for URI symptoms and being exposed to the general public. Exposed to elderly.         Review of Systems    Review of Systems   Constitutional: Negative for appetite change, chills, diaphoresis, fatigue and fever.   HENT: Positive for congestion, postnasal drip, rhinorrhea, sinus pressure and sneezing. Negative for ear pain, hoarse voice, sore throat, swollen glands and trouble swallowing.    Eyes: Negative for visual disturbance.   Respiratory: Negative for cough, chest tightness, shortness of breath and wheezing.    Cardiovascular: Negative for chest pain and palpitations.   Gastrointestinal: Negative for abdominal pain, diarrhea, nausea and vomiting.   Musculoskeletal: Negative for neck pain.   Skin: Negative for color change and rash.   Neurological: Negative for dizziness, weakness, light-headedness and headache.   Psychiatric/Behavioral: Positive for sleep disturbance.         [facility]  The following portions of the patient's history were reviewed and updated as appropriate: allergies, current medications, past family history,  past medical history, past social history, past surgical history and problem list.     Past Medical History:   Diagnosis Date   • Chronic back pain      Social History     Tobacco Use   • Smoking status: Current Some Day Smoker     Packs/day: 0.00     Years: 0.00     Pack years: 0.00     Types: Cigarettes     Last attempt to quit: 3/2/2018     Years since quitting: 3.1   • Smokeless tobacco: Current User     Types: Snuff   • Tobacco comment: just when drinking   Substance Use Topics   • Alcohol use: Yes     Comment: occasionally     Past Surgical History:   Procedure Laterality Date   • FINGER SURGERY Right      Family History   Problem Relation Age of Onset   • Diabetes Mother    • Heart disease Maternal Grandfather    • Alcohol abuse Maternal Grandfather    • Cancer Father    • Alcohol abuse Father      No Known Allergies      Current Outpatient Medications:   •  cabergoline (DOSTINEX) 0.5 MG tablet, Take 1 tablet by mouth 2 (Two) Times a Week., Disp: 8 tablet, Rfl: 11  •  finasteride (PROPECIA) 1 MG tablet, Take 1 tablet by mouth Daily., Disp: 30 tablet, Rfl: 2  •  imiquimod (ALDARA) 5 % cream, Apply  topically to the appropriate area as directed 3 (Three) Times a Week., Disp: 24 packet, Rfl: 0  •  tadalafil (CIALIS) 20 MG tablet, Take 1 tablet by mouth Daily As Needed for Erectile Dysfunction., Disp: 30 tablet, Rfl: 1  •  traZODone (DESYREL) 100 MG tablet, Take 1 tablet by mouth Every Night., Disp: 30 tablet, Rfl: 5    Objective   Vitals: unable to assess due audio only encounter      Physical Exam: unable to assess due to audio only encounter.          Assessment and Plan    Diagnoses and all orders for this visit:    1. Acute URI (Primary)    Discussed Viral vs. Bacterial etiology. Signs and symptoms consistent with viral infection at this time. Advised in symptomatic relief with:     • Adequate rest, humidification, and increase clear fluid intake.   • warm salt water gargles, lozenges, honey as a natural  antitussive, and/or Delsym syrup as needed for cough.   • OTC Mucinex 600 mg twice daily for chest congestion  • nasal saline irrigation with sterile saline nasal spray twice daily for sinus congestion  • Can use OTC nasal steroid (I.e. Flonase, Nasacort, Nasonex) in addition to prescribed antibiotic therapy for nasal and/or ear congestion  • Use warm compresses over sinuses for comfort.   • Alternate use of acetaminophen and Ibuprofen every 4-6 hours as needed for headache, body aches, and/or fever reduction  • Use OTC Loratadine (Claritin), Cetrizine (Zyrtec), or Fexofenadine (Allegra) once daily for ear congestion.    Patient advised to follow-up in office with PCP should symptoms worsen or persist for physical examination given inability to perform with last 2 telehealth visits. Discussed okay to be around others so long as using proper hygiene measures with frequent handwashing, wearing masks, avoidance of sharing food, beverages, eating utensils.     Unable to complete visit using a video connection to the patient. Audio only was used to complete this visit. Total time of discussion was 12 minutes.      Today I have spent a total of 15 minutes on an audio only encounter with Sunny Drummond. During this time, a total of 15 minutes was spent in direct discussion with patient regarding pertinent diagnoses, treatment modalities, medications, and follow-up. Education includes verbal and written discussion on the nature of the diagnoses including treatment, complications, implications, and management. Indications for further evaluation and work-up provided.      Follow Up   Return if symptoms worsen or fail to improve.    Plan of care reviewed with patient at conclusion of today's visit. Patient education was provided regarding diagnosis, management, and prescribed or recommended OTC medications. Patient was informed to notify office of any new, worsening, or persistent symptoms. Patient verbalized understanding and  agreement with plan of care.     Electronically Signed By:  YEE Britt  05/07/2021    EMR Dragon/Transcription Disclaimer:  Please note that portions of this encounter note were completed using electronic transcription/translation of spoken language to printed text.  The electronic transcription/translation of spoken language may permit erroneous, or at times, nonsensical words or phrases to be inadvertently transcribed.  Although I have reviewed the note for such errors, some may still exist in this documentation.

## 2021-05-07 NOTE — PATIENT INSTRUCTIONS
"Upper Respiratory Infection, Adult  An upper respiratory infection (URI) affects the nose, throat, and upper air passages. URIs are caused by germs (viruses). The most common type of URI is often called \"the common cold.\"  Medicines cannot cure URIs, but you can do things at home to relieve your symptoms. URIs usually get better within 7-10 days.  Follow these instructions at home:  Activity  · Rest as needed.  · If you have a fever, stay home from work or school until your fever is gone, or until your doctor says you may return to work or school.  ? You should stay home until you cannot spread the infection anymore (you are not contagious).  ? Your doctor may have you wear a face mask so you have less risk of spreading the infection.  Relieving symptoms  · Gargle with a salt-water mixture 3-4 times a day or as needed. To make a salt-water mixture, completely dissolve ½-1 tsp of salt in 1 cup of warm water.  · Use a cool-mist humidifier to add moisture to the air. This can help you breathe more easily.  Eating and drinking    · Drink enough fluid to keep your pee (urine) pale yellow.  · Eat soups and other clear broths.  General instructions    · Take over-the-counter and prescription medicines only as told by your doctor. These include cold medicines, fever reducers, and cough suppressants.  · Do not use any products that contain nicotine or tobacco. These include cigarettes and e-cigarettes. If you need help quitting, ask your doctor.  · Avoid being where people are smoking (avoid secondhand smoke).  · Make sure you get regular shots and get the flu shot every year.  · Keep all follow-up visits as told by your doctor. This is important.  How to avoid spreading infection to others    · Wash your hands often with soap and water. If you do not have soap and water, use hand .  · Avoid touching your mouth, face, eyes, or nose.  · Cough or sneeze into a tissue or your sleeve or elbow. Do not cough or sneeze " "into your hand or into the air.  Contact a doctor if:  · You are getting worse, not better.  · You have any of these:  ? A fever.  ? Chills.  ? Brown or red mucus in your nose.  ? Yellow or brown fluid (discharge)coming from your nose.  ? Pain in your face, especially when you bend forward.  ? Swollen neck glands.  ? Pain with swallowing.  ? White areas in the back of your throat.  Get help right away if:  · You have shortness of breath that gets worse.  · You have very bad or constant:  ? Headache.  ? Ear pain.  ? Pain in your forehead, behind your eyes, and over your cheekbones (sinus pain).  ? Chest pain.  · You have long-lasting (chronic) lung disease along with any of these:  ? Wheezing.  ? Long-lasting cough.  ? Coughing up blood.  ? A change in your usual mucus.  · You have a stiff neck.  · You have changes in your:  ? Vision.  ? Hearing.  ? Thinking.  ? Mood.  Summary  · An upper respiratory infection (URI) is caused by a germ called a virus. The most common type of URI is often called \"the common cold.\"  · URIs usually get better within 7-10 days.  · Take over-the-counter and prescription medicines only as told by your doctor.  This information is not intended to replace advice given to you by your health care provider. Make sure you discuss any questions you have with your health care provider.  Document Revised: 12/26/2019 Document Reviewed: 08/10/2018  Elsevier Patient Education © 2021 [employer]    "

## 2021-05-14 DIAGNOSIS — N52.9 ERECTILE DYSFUNCTION, UNSPECIFIED ERECTILE DYSFUNCTION TYPE: ICD-10-CM

## 2021-05-14 DIAGNOSIS — G47.00 INSOMNIA, UNSPECIFIED TYPE: ICD-10-CM

## 2021-05-14 DIAGNOSIS — L64.9 MALE PATTERN BALDNESS: ICD-10-CM

## 2021-05-14 DIAGNOSIS — E22.1 HYPERPROLACTINEMIA (HCC): ICD-10-CM

## 2021-05-15 RX ORDER — TRAZODONE HYDROCHLORIDE 100 MG/1
100 TABLET ORAL NIGHTLY
Qty: 30 TABLET | Refills: 5 | Status: SHIPPED | OUTPATIENT
Start: 2021-05-15 | End: 2021-12-29

## 2021-05-15 RX ORDER — TADALAFIL 20 MG/1
20 TABLET ORAL DAILY PRN
Qty: 30 TABLET | Refills: 5 | Status: SHIPPED | OUTPATIENT
Start: 2021-05-15 | End: 2021-06-02

## 2021-05-15 RX ORDER — FINASTERIDE 1 MG/1
1 TABLET, FILM COATED ORAL DAILY
Qty: 30 TABLET | Refills: 5 | Status: SHIPPED | OUTPATIENT
Start: 2021-05-15 | End: 2022-05-19

## 2021-05-15 NOTE — TELEPHONE ENCOUNTER
Last Office Visit: 02/02/21  Next Office Visit:    Labs completed in past 6 months? yes  Labs completed in past year? yes    cialis  Last Refill Date: 02/02/21  Quantity:30  Refills:1    Finasteride  Last refill: 11/17/20  Quantity: 30  Refill: 2    trazodone  Last refill:  11/03/20  Quantity: 30  Refill: 5      Pharmacy:     Please review pended refill request for any changes needed on refills or quantities. Thank you!

## 2021-05-16 DIAGNOSIS — N52.9 ERECTILE DYSFUNCTION, UNSPECIFIED ERECTILE DYSFUNCTION TYPE: ICD-10-CM

## 2021-05-17 RX ORDER — CABERGOLINE 0.5 MG/1
0.5 TABLET ORAL 2 TIMES WEEKLY
Qty: 8 TABLET | Refills: 11 | Status: SHIPPED | OUTPATIENT
Start: 2021-05-17 | End: 2022-05-10 | Stop reason: SDUPTHER

## 2021-05-17 RX ORDER — TADALAFIL 20 MG/1
TABLET ORAL
Qty: 30 TABLET | Refills: 0 | OUTPATIENT
Start: 2021-05-17

## 2021-05-20 ENCOUNTER — TELEPHONE (OUTPATIENT)
Dept: INTERNAL MEDICINE | Facility: CLINIC | Age: 36
End: 2021-05-20

## 2021-05-20 NOTE — TELEPHONE ENCOUNTER
PT notified that PA received for Finasteride pt notified that he may use [facility] to cover the cost of medication. Insurance will not approve PT voiced understanding

## 2021-06-01 ENCOUNTER — TELEPHONE (OUTPATIENT)
Dept: INTERNAL MEDICINE | Facility: CLINIC | Age: 36
End: 2021-06-01

## 2021-06-01 ENCOUNTER — TREATMENT (OUTPATIENT)
Dept: PHYSICAL THERAPY | Facility: CLINIC | Age: 36
End: 2021-06-01

## 2021-06-01 DIAGNOSIS — M54.2 PAIN, NECK: Primary | ICD-10-CM

## 2021-06-01 PROCEDURE — 97162 PT EVAL MOD COMPLEX 30 MIN: CPT | Performed by: PHYSICAL THERAPIST

## 2021-06-01 NOTE — TELEPHONE ENCOUNTER
Spoke with pt informed him that labs would be completed during physical apt, pt voiced understanding and apt was scheduled for physical. PT missed apt last year.
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
## 2021-06-01 NOTE — TELEPHONE ENCOUNTER
Caller: Sunny Drummond    Relationship: Self    Best call back number: [phone]     What orders are you requesting (i.e. lab or imaging): LABS    In what timeframe would the patient need to come in: PATIENT WOULD LIKE TO COME IN AROUND 11 AM TODAY.    Where will you receive your lab/imaging services: [facility]    Additional notes: PATIENT WOULD LIKE A CALL WHEN THIS HAS BEEN DONE.

## 2021-06-02 ENCOUNTER — LAB (OUTPATIENT)
Dept: LAB | Facility: HOSPITAL | Age: 36
End: 2021-06-02

## 2021-06-02 ENCOUNTER — OFFICE VISIT (OUTPATIENT)
Dept: INTERNAL MEDICINE | Facility: CLINIC | Age: 36
End: 2021-06-02

## 2021-06-02 VITALS
BODY MASS INDEX: 27.76 KG/M2 | TEMPERATURE: 97.5 F | DIASTOLIC BLOOD PRESSURE: 68 MMHG | HEART RATE: 66 BPM | OXYGEN SATURATION: 97 % | SYSTOLIC BLOOD PRESSURE: 112 MMHG | HEIGHT: 69 IN | WEIGHT: 187.4 LBS

## 2021-06-02 DIAGNOSIS — N52.8 OTHER MALE ERECTILE DYSFUNCTION: ICD-10-CM

## 2021-06-02 DIAGNOSIS — Z00.00 ANNUAL PHYSICAL EXAM: ICD-10-CM

## 2021-06-02 DIAGNOSIS — Z00.00 ANNUAL PHYSICAL EXAM: Primary | ICD-10-CM

## 2021-06-02 DIAGNOSIS — E29.0 MALE HYPERTESTOSTERONEMIA: ICD-10-CM

## 2021-06-02 LAB
ALBUMIN SERPL-MCNC: 4.2 G/DL (ref 3.5–5.2)
ALBUMIN/GLOB SERPL: 1.6 G/DL
ALP SERPL-CCNC: 32 U/L (ref 39–117)
ALT SERPL W P-5'-P-CCNC: 46 U/L (ref 1–41)
ANION GAP SERPL CALCULATED.3IONS-SCNC: 7.7 MMOL/L (ref 5–15)
AST SERPL-CCNC: 49 U/L (ref 1–40)
BASOPHILS # BLD AUTO: 0.19 10*3/MM3 (ref 0–0.2)
BASOPHILS NFR BLD AUTO: 2.2 % (ref 0–1.5)
BILIRUB SERPL-MCNC: 0.4 MG/DL (ref 0–1.2)
BUN SERPL-MCNC: 15 MG/DL (ref 6–20)
BUN/CREAT SERPL: 16.1 (ref 7–25)
CALCIUM SPEC-SCNC: 9.3 MG/DL (ref 8.6–10.5)
CHLORIDE SERPL-SCNC: 103 MMOL/L (ref 98–107)
CHOLEST SERPL-MCNC: 156 MG/DL (ref 0–200)
CO2 SERPL-SCNC: 24.3 MMOL/L (ref 22–29)
CREAT SERPL-MCNC: 0.93 MG/DL (ref 0.76–1.27)
DEPRECATED RDW RBC AUTO: 52.1 FL (ref 37–54)
EOSINOPHIL # BLD AUTO: 0.08 10*3/MM3 (ref 0–0.4)
EOSINOPHIL NFR BLD AUTO: 0.9 % (ref 0.3–6.2)
ERYTHROCYTE [DISTWIDTH] IN BLOOD BY AUTOMATED COUNT: 15.3 % (ref 12.3–15.4)
GFR SERPL CREATININE-BSD FRML MDRD: 92 ML/MIN/1.73
GLOBULIN UR ELPH-MCNC: 2.6 GM/DL
GLUCOSE SERPL-MCNC: 80 MG/DL (ref 65–99)
HBA1C MFR BLD: 5.23 % (ref 4.8–5.6)
HCT VFR BLD AUTO: 44.9 % (ref 37.5–51)
HCV AB SER DONR QL: NORMAL
HDLC SERPL-MCNC: 19 MG/DL (ref 40–60)
HGB BLD-MCNC: 14.5 G/DL (ref 13–17.7)
IMM GRANULOCYTES # BLD AUTO: 0.04 10*3/MM3 (ref 0–0.05)
IMM GRANULOCYTES NFR BLD AUTO: 0.5 % (ref 0–0.5)
LDLC SERPL CALC-MCNC: 124 MG/DL (ref 0–100)
LDLC/HDLC SERPL: 6.51 {RATIO}
LYMPHOCYTES # BLD AUTO: 1.88 10*3/MM3 (ref 0.7–3.1)
LYMPHOCYTES NFR BLD AUTO: 22.1 % (ref 19.6–45.3)
MCH RBC QN AUTO: 30 PG (ref 26.6–33)
MCHC RBC AUTO-ENTMCNC: 32.3 G/DL (ref 31.5–35.7)
MCV RBC AUTO: 92.8 FL (ref 79–97)
MONOCYTES # BLD AUTO: 0.68 10*3/MM3 (ref 0.1–0.9)
MONOCYTES NFR BLD AUTO: 8 % (ref 5–12)
NEUTROPHILS NFR BLD AUTO: 5.65 10*3/MM3 (ref 1.7–7)
NEUTROPHILS NFR BLD AUTO: 66.3 % (ref 42.7–76)
NRBC BLD AUTO-RTO: 0 /100 WBC (ref 0–0.2)
PLATELET # BLD AUTO: 755 10*3/MM3 (ref 140–450)
PMV BLD AUTO: 10.7 FL (ref 6–12)
POTASSIUM SERPL-SCNC: 4.8 MMOL/L (ref 3.5–5.2)
PROLACTIN SERPL-MCNC: 0.48 NG/ML (ref 4.04–15.2)
PROT SERPL-MCNC: 6.8 G/DL (ref 6–8.5)
RBC # BLD AUTO: 4.84 10*6/MM3 (ref 4.14–5.8)
SODIUM SERPL-SCNC: 135 MMOL/L (ref 136–145)
TESTOST SERPL-MCNC: >1500 NG/DL (ref 249–836)
TRIGL SERPL-MCNC: 67 MG/DL (ref 0–150)
VLDLC SERPL-MCNC: 13 MG/DL (ref 5–40)
WBC # BLD AUTO: 8.52 10*3/MM3 (ref 3.4–10.8)

## 2021-06-02 PROCEDURE — 86803 HEPATITIS C AB TEST: CPT

## 2021-06-02 PROCEDURE — 83036 HEMOGLOBIN GLYCOSYLATED A1C: CPT

## 2021-06-02 PROCEDURE — 80053 COMPREHEN METABOLIC PANEL: CPT

## 2021-06-02 PROCEDURE — 85025 COMPLETE CBC W/AUTO DIFF WBC: CPT

## 2021-06-02 PROCEDURE — 80061 LIPID PANEL: CPT

## 2021-06-02 PROCEDURE — 99395 PREV VISIT EST AGE 18-39: CPT | Performed by: NURSE PRACTITIONER

## 2021-06-02 PROCEDURE — 84403 ASSAY OF TOTAL TESTOSTERONE: CPT

## 2021-06-02 PROCEDURE — 84146 ASSAY OF PROLACTIN: CPT

## 2021-06-02 RX ORDER — SILDENAFIL 100 MG/1
100 TABLET, FILM COATED ORAL DAILY PRN
Qty: 16 TABLET | Refills: 2 | Status: SHIPPED | OUTPATIENT
Start: 2021-06-02 | End: 2021-11-17

## 2021-06-02 NOTE — PROGRESS NOTES
Chief Complaint   Patient presents with   • Annual Exam     meds, labs       History of Present Illness  36 y.o.male presents for health maintanance, Adult male    General Health: The patient's health is described as good. He denies vision problems.  He denies hearing loss. Immunizations status reviewed and plan to update today if needed.  Chronic medical problems:  Insomnia chronic onset greater than 1 year.  Has been taking trazodone and seems to help his sleep.    Erectile dysfunction chronic onset greater than 1 year.  Takes Viagra as needed, does have to take 3 or 4 of the 20 mg tablets to work.  Followed by endocrinology for hypertestosteronemia.  Patient reports he has stopped all testosterone supplementation.  He does still take the cabergoline.  Social History/Lifestyle:   Social History     Socioeconomic History   • Marital status:    Tobacco Use   • Smoking status: Current Some Day Smoker     Packs/day: 0.00     Years: 0.00     Pack years: 0.00     Types: Cigarettes     Last attempt to quit: 3/2/2018     Years since quitting: 3.2   • Smokeless tobacco: Current User     Types: Snuff   • Tobacco comment: just when drinking   Vaping Use   • Vaping Use: Some days   • Substances: Nicotine   Substance and Sexual Activity   • Alcohol use: Yes     Comment: occasionally   • Drug use: No   • Sexual activity: Defer     Screening:    Risk screening reviewed:  Health Maintenance   Topic Date Due   • Pneumococcal Vaccine 0-64 (1 of 1 - PPSV23) Never done   • COVID-19 Vaccine (1) Never done   • TDAP/TD VACCINES (1 - Tdap) Never done   • HEPATITIS C SCREENING  Never done   • INFLUENZA VACCINE  08/01/2021   • ANNUAL PHYSICAL  06/03/2022        Review of Systems   Constitutional: Negative for chills and fatigue.   HENT: Negative for congestion, postnasal drip and rhinorrhea.    Eyes: Negative for blurred vision and visual disturbance.   Respiratory: Negative for cough and shortness of breath.    Cardiovascular:  "Negative for chest pain, palpitations and leg swelling.   Gastrointestinal: Negative for constipation, diarrhea, nausea and vomiting.   Genitourinary: Positive for erectile dysfunction. Negative for difficulty urinating.   Musculoskeletal: Negative for arthralgias.   Skin: Negative for rash.   Neurological: Negative for dizziness and headache.   Psychiatric/Behavioral: Positive for sleep disturbance. Negative for depressed mood. The patient is not nervous/anxious.          [facility]  The following portions of the patient's history were reviewed and updated as appropriate: allergies, current medications, past family history, past medical history, past social history, past surgical history and problem list.     Past Medical History:   Diagnosis Date   • Chronic back pain    • Insomnia    • Male hypertestosteronemia       Past Surgical History:   Procedure Laterality Date   • FINGER SURGERY Right       No Known Allergies   Family History   Problem Relation Age of Onset   • Diabetes Mother    • Heart disease Maternal Grandfather    • Alcohol abuse Maternal Grandfather    • Cancer Father    • Alcohol abuse Father           Current Outpatient Medications:   •  cabergoline (DOSTINEX) 0.5 MG tablet, Take 1 tablet by mouth 2 (Two) Times a Week for 336 days., Disp: 8 tablet, Rfl: 11  •  finasteride (PROPECIA) 1 MG tablet, Take 1 tablet by mouth Daily., Disp: 30 tablet, Rfl: 5  •  tadalafil (CIALIS) 20 MG tablet, Take 1 tablet by mouth Daily As Needed for Erectile Dysfunction., Disp: 30 tablet, Rfl: 5  •  traZODone (DESYREL) 100 MG tablet, Take 1 tablet by mouth Every Night., Disp: 30 tablet, Rfl: 5  •  imiquimod (ALDARA) 5 % cream, Apply  topically to the appropriate area as directed 3 (Three) Times a Week., Disp: 24 packet, Rfl: 0    VITALS:  /68   Pulse 66   Temp 97.5 °F (36.4 °C)   Ht 174 cm (68.5\")   Wt 85 kg (187 lb 6.4 oz)   SpO2 97%   BMI 28.08 kg/m²     Physical Exam  Vitals reviewed.   Constitutional:       " General: He is not in acute distress.     Appearance: He is well-developed. He is not ill-appearing or diaphoretic.   HENT:      Head: Normocephalic.      Right Ear: Tympanic membrane, ear canal and external ear normal.      Left Ear: Tympanic membrane, ear canal and external ear normal.      Nose: Nose normal.      Mouth/Throat:      Mouth: Mucous membranes are moist.      Pharynx: Oropharynx is clear. No oropharyngeal exudate or posterior oropharyngeal erythema.   Eyes:      General: Lids are normal.         Right eye: No discharge.         Left eye: No discharge.      Extraocular Movements: Extraocular movements intact.      Conjunctiva/sclera: Conjunctivae normal.      Pupils: Pupils are equal, round, and reactive to light.   Neck:      Thyroid: No thyromegaly.      Vascular: No JVD.   Cardiovascular:      Rate and Rhythm: Normal rate and regular rhythm.      Pulses: Normal pulses.      Heart sounds: Normal heart sounds.      Comments: No edema  Pulmonary:      Effort: Pulmonary effort is normal. No respiratory distress.      Breath sounds: Normal breath sounds.   Abdominal:      General: Bowel sounds are normal. There is no distension or abdominal bruit.      Palpations: Abdomen is soft. There is no hepatomegaly, splenomegaly or mass.      Tenderness: There is no abdominal tenderness. There is no right CVA tenderness or left CVA tenderness.      Hernia: No hernia is present.   Musculoskeletal:         General: Normal range of motion.      Cervical back: Normal range of motion and neck supple.      Comments: All major joints with normal ROM   Lymphadenopathy:      Head:      Right side of head: No submental, submandibular or tonsillar adenopathy.      Left side of head: No submental, submandibular or tonsillar adenopathy.      Cervical: No cervical adenopathy.   Skin:     General: Skin is warm and dry.      Capillary Refill: Capillary refill takes less than 2 seconds.      Findings: No rash.   Neurological:       General: No focal deficit present.      Mental Status: He is alert and oriented to person, place, and time.      Cranial Nerves: No cranial nerve deficit.      Coordination: Coordination normal.      Gait: Gait normal.   Psychiatric:         Mood and Affect: Mood normal.         Speech: Speech normal.         Behavior: Behavior normal.         LABS  pending    ASSESSMENT/PLAN  Diagnoses and all orders for this visit:    1. Annual physical exam (Primary)  -     CBC & Differential; Future  -     Comprehensive Metabolic Panel; Future  -     Hepatitis C Antibody; Future  -     Prolactin; Future  -     Testosterone; Future  -     Hemoglobin A1c; Future  -     Lipid Panel; Future    2. Male hypertestosteronemia  -     Prolactin; Future  -     Testosterone; Future    3. Other male erectile dysfunction  -     sildenafil (VIAGRA) 100 MG tablet; Take 1 tablet by mouth Daily As Needed for Erectile Dysfunction. Do not exceed more than 4 times per week.  Dispense: 16 tablet; Refill: 2        Nutrition and activity goals reviewed including: mainly water to drink, limit white flour/processed sugar; increase high protein, high fiber carbs, good breakfast, working toward 150 mins cardio per week, resistance training 2x/week.    The patient is here for a health maintenance visit.  Screening lab work is ordered.  Immunizations are reported as current.  Advice and education is given regarding nutrition, aerobic exercise, routine dental evaluations, routine eye exams, reproductive health, cardiovascular risk reduction.  Further recommendations after lab evaluation.  Annual wellness evaluations recommended.     I discussed the patients findings and my recommendations with patient.  Patient was encouraged to keep me informed of any acute changes or any new concerning symptoms.  Patient voiced understanding of all instructions and denied further questions.    FOLLOW-UP  Return in about 1 year (around 6/2/2022), or if symptoms worsen or  fail to improve, for Annual.    Electronically signed by:    YEE Hardy  06/02/2021

## 2021-06-05 PROBLEM — N52.8 OTHER MALE ERECTILE DYSFUNCTION: Status: ACTIVE | Noted: 2021-06-05

## 2021-06-06 RX ORDER — ASPIRIN 81 MG/1
81 TABLET ORAL DAILY
Qty: 30 TABLET | Refills: 5 | Status: SHIPPED | OUTPATIENT
Start: 2021-06-06 | End: 2021-08-25

## 2021-06-06 NOTE — PROGRESS NOTES
Lab review: negative diabetes screening. Testosterone levels remain very high. Prolactin level is down. Negative hep C screening.  Electrolytes kidney function ok. Liver enzymes slightly elevated but stable compared to few months prior. Total cholesterol ok. LDL bad cholesterol slightly elevated. Cut back on fried foods. No anemia.  Your platelet count is elevated 755; this is higher than a few months back.  Keep follow up with endocrine; important to be compliant with not taking steroid supplements thru gym or selfprescribed.  START TAKING ASPRIN 81MG DAILY. I sent rx to your pharmacy. This will help offset risk of higher platelet count.  I want to see you back for FU in 6 months for repeat CBC/platelet check.

## 2021-06-09 ENCOUNTER — TREATMENT (OUTPATIENT)
Dept: PHYSICAL THERAPY | Facility: CLINIC | Age: 36
End: 2021-06-09

## 2021-06-09 DIAGNOSIS — M54.2 PAIN, NECK: Primary | ICD-10-CM

## 2021-06-09 PROCEDURE — 97140 MANUAL THERAPY 1/> REGIONS: CPT | Performed by: PHYSICAL THERAPIST

## 2021-06-09 PROCEDURE — 97110 THERAPEUTIC EXERCISES: CPT | Performed by: PHYSICAL THERAPIST

## 2021-06-09 PROCEDURE — 97112 NEUROMUSCULAR REEDUCATION: CPT | Performed by: PHYSICAL THERAPIST

## 2021-06-09 NOTE — PROGRESS NOTES
Physical Therapy Daily Progress Note    Subjective   Sunny Drummond reports: he feels about the same. He really wants to try dry needling.      Objective   See Exercise, Manual, and Modality Logs for complete treatment.       Assessment/Plan   Pt tolerated treatment well. He is lacking mobility in his thoracic spine. Discussed dry needling next visit. Pt would benefit from continued skilled PT.    Progress per Plan of Care           Manual Therapy:    24     mins  64254;  Therapeutic Exercise:    12     mins  97441;     Neuromuscular Jimmy:    10    mins  30915;    Therapeutic Activity:          mins  31764;     Gait Training:           mins  08282;     Ultrasound:          mins  71304;    Electrical Stimulation:         mins  04165 ( );  E-Stim Attended:         mins  83769  Iontophoresis          mins 33575   Traction          mins  65597  Fluidotherapy          mins  22348  Dry Needling          mins self-pay - No Charge  Paraffin          mins  44503    Timed Treatment:   46   mins   Total Treatment:     46   mins    Latasha Bender, PT, DPT  Physical Therapist

## 2021-06-12 DIAGNOSIS — N52.8 OTHER MALE ERECTILE DYSFUNCTION: ICD-10-CM

## 2021-06-12 RX ORDER — SILDENAFIL 100 MG/1
100 TABLET, FILM COATED ORAL DAILY PRN
Qty: 16 TABLET | Refills: 2 | Status: CANCELLED | OUTPATIENT
Start: 2021-06-12

## 2021-06-12 RX ORDER — ASPIRIN 81 MG/1
81 TABLET ORAL DAILY
Qty: 30 TABLET | Refills: 5 | Status: CANCELLED | OUTPATIENT
Start: 2021-06-12

## 2021-06-23 ENCOUNTER — TREATMENT (OUTPATIENT)
Dept: PHYSICAL THERAPY | Facility: CLINIC | Age: 36
End: 2021-06-23

## 2021-06-23 DIAGNOSIS — M54.2 PAIN, NECK: Primary | ICD-10-CM

## 2021-06-23 PROCEDURE — 97110 THERAPEUTIC EXERCISES: CPT | Performed by: PHYSICAL THERAPIST

## 2021-06-23 PROCEDURE — 97140 MANUAL THERAPY 1/> REGIONS: CPT | Performed by: PHYSICAL THERAPIST

## 2021-06-23 NOTE — PROGRESS NOTES
Physical Therapy Daily Progress Note    Subjective   Sunny Drummond reports: he tweaked his shoulder yesterday reaching back and is feeling very sore.      Objective   See Exercise, Manual, and Modality Logs for complete treatment.       Assessment/Plan  Pt tolerated treatment well. Almost all exercises irritated his shoulder pain, so treatment was mostly manual therapy. Pt would benefit from continued skilled PT.    Progress per Plan of Care           Manual Therapy:    28     mins  86504;  Therapeutic Exercise:    10     mins  71461;     Neuromuscular Jimmy:        mins  85100;    Therapeutic Activity:          mins  22983;     Gait Training:           mins  40403;     Ultrasound:          mins  16436;    Electrical Stimulation:         mins  22395 ( );  E-Stim Attended:         mins  93487  Iontophoresis          mins 52526   Traction          mins  39512  Fluidotherapy          mins  40488  Dry Needling          mins self-pay - No Charge  Paraffin          mins  22247    Timed Treatment:   38   mins   Total Treatment:     38   mins    Latasha Bender, PT, DPT  Physical Therapist

## 2021-06-24 ENCOUNTER — TREATMENT (OUTPATIENT)
Dept: PHYSICAL THERAPY | Facility: CLINIC | Age: 36
End: 2021-06-24

## 2021-06-24 DIAGNOSIS — M54.2 PAIN, NECK: Primary | ICD-10-CM

## 2021-06-24 DIAGNOSIS — M54.9 UPPER BACK PAIN: ICD-10-CM

## 2021-06-24 PROCEDURE — DRYNDL PR CUSTOM DRY NEEDLING SELF PAY: Performed by: PHYSICAL THERAPIST

## 2021-06-28 NOTE — PROGRESS NOTES
Physical Therapy Daily Progress Note    Subjective   Sunny Drummond reports that he has persistent tightness in the neck and upper back. He has tried dry needling in the past and has experienced some improvement.       Objective   See Exercise, Manual, and Modality Logs for complete treatment.       Assessment/Plan     Dry needling performed in the clinic today, focused on mid/upper back and cervical spine. Will assess his response and progress as indicated     Progress per Plan of Care and Progress strengthening /stabilization /functional activity           Manual Therapy:         mins  86043;  Therapeutic Exercise:         mins  75536;     Neuromuscular Jimmy:        mins  28269;    Therapeutic Activity:          mins  18051;     Gait Training:           mins  77162;     Ultrasound:          mins  00352;    Electrical Stimulation:         mins  96703 ( );  E-Stim Attended:         mins  14431  Iontophoresis          mins 03493   Traction          mins  17177  Fluidotherapy          mins  50835  Dry Needling     45     mins self-pay - No Charge  Paraffin          mins  82660    Timed Treatment:   45   mins   Total Treatment:     45   mins    Andrew Avila, PT, DPT, OCS, Cert. DN  Physical Therapist

## 2021-07-06 ENCOUNTER — TELEMEDICINE (OUTPATIENT)
Dept: INTERNAL MEDICINE | Facility: CLINIC | Age: 36
End: 2021-07-06

## 2021-07-06 DIAGNOSIS — K52.9 GASTROENTERITIS: Primary | ICD-10-CM

## 2021-07-06 PROCEDURE — 99442 PR PHYS/QHP TELEPHONE EVALUATION 11-20 MIN: CPT | Performed by: NURSE PRACTITIONER

## 2021-07-06 NOTE — PROGRESS NOTES
You have chosen to receive care through a telephone visit. Do you consent to use a telephone visit for your medical care today? Yes    Chief Complaint   Patient presents with   • Nausea       History of Present Illness    36 y.o.male presents for nausea.  Had Food illness yesterday with nausea upset stomach. [place]. Had bbq day before. No fever. Has improved. Eating drinking ok just not much appetite.  Need work note.    Review of Systems   Constitutional: Negative for chills and fever.   Gastrointestinal: Positive for abdominal pain, nausea and vomiting. Negative for constipation and diarrhea.         [facility]  The following portions of the patient's history were reviewed and updated as appropriate: allergies, current medications, past family history, past medical history, past social history, past surgical history and problem list.     Past Medical History:   Diagnosis Date   • Chronic back pain    • Insomnia    • Male hypertestosteronemia       No Known Allergies   Social History     Tobacco Use   • Smoking status: Current Some Day Smoker     Packs/day: 0.00     Years: 0.00     Pack years: 0.00     Types: Cigarettes     Last attempt to quit: 3/2/2018     Years since quitting: 3.3   • Smokeless tobacco: Current User     Types: Snuff   • Tobacco comment: just when drinking   Vaping Use   • Vaping Use: Some days   • Substances: Nicotine   Substance Use Topics   • Alcohol use: Yes     Comment: occasionally   • Drug use: No           Current Outpatient Medications:   •  aspirin (aspirin) 81 MG EC tablet, Take 1 tablet by mouth Daily., Disp: 30 tablet, Rfl: 5  •  cabergoline (DOSTINEX) 0.5 MG tablet, Take 1 tablet by mouth 2 (Two) Times a Week for 336 days., Disp: 8 tablet, Rfl: 11  •  finasteride (PROPECIA) 1 MG tablet, Take 1 tablet by mouth Daily., Disp: 30 tablet, Rfl: 5  •  sildenafil (VIAGRA) 100 MG tablet, Take 1 tablet by mouth Daily As Needed for Erectile Dysfunction. Do not exceed more than 4 times per week.,  Disp: 16 tablet, Rfl: 2  •  traZODone (DESYREL) 100 MG tablet, Take 1 tablet by mouth Every Night., Disp: 30 tablet, Rfl: 5      Physical Exam  telehealth      Assessment and Plan    Diagnoses and all orders for this visit:    1. Gastroenteritis (Primary)    improving. Cont to focus on hydration. [place] diet.  Off work note on file.    This visit has been rescheduled as a phone visit to comply with patient safety concerns in accordance with CDC recommendations. Total time of discussion was 12 minutes.    I discussed the patients findings and my recommendations with patient.  Patient was encouraged to keep me informed of any acute changes, lack of improvement, or any new concerning symptoms.  Patient voiced understanding of all instructions and denied further questions.      Follow Up   Return if symptoms worsen or fail to improve.      Electronically signed by:    YEE Hardy  07/06/2021

## 2021-07-07 ENCOUNTER — TREATMENT (OUTPATIENT)
Dept: PHYSICAL THERAPY | Facility: CLINIC | Age: 36
End: 2021-07-07

## 2021-07-07 DIAGNOSIS — M54.2 PAIN, NECK: Primary | ICD-10-CM

## 2021-07-07 PROCEDURE — 97112 NEUROMUSCULAR REEDUCATION: CPT | Performed by: PHYSICAL THERAPIST

## 2021-07-07 PROCEDURE — 97140 MANUAL THERAPY 1/> REGIONS: CPT | Performed by: PHYSICAL THERAPIST

## 2021-07-07 NOTE — PROGRESS NOTES
Physical Therapy Daily Progress Note    Subjective   Sunny Drummond reports: his neck has done well for awhile, but started hurting again. It is not as bad as it originally was. He is also getting pain in the back of his shoulder blade.      Objective   See Exercise, Manual, and Modality Logs for complete treatment.       Assessment/Plan   Pt tolerated treatment well. He was encouraged to focus on light weight, external rotation exercises at the gym to strengthen his rotator cuff. Pt is leaving for vacation for the next month and will call to schedule when he returns.    Progress per Plan of Care           Manual Therapy:    15     mins  68828;  Therapeutic Exercise:         mins  92944;     Neuromuscular Jimmy:    10    mins  25331;    Therapeutic Activity:          mins  33640;     Gait Training:           mins  62148;     Ultrasound:          mins  62425;    Electrical Stimulation:         mins  33927 ( );  E-Stim Attended:         mins  55893  Iontophoresis          mins 70613   Traction          mins  34223  Fluidotherapy          mins  79129  Dry Needling          mins self-pay - No Charge  Paraffin          mins  22687    Timed Treatment:   25   mins   Total Treatment:     48   mins    Latasha Bender, PT, DPT  Physical Therapist

## 2021-07-29 ENCOUNTER — LAB (OUTPATIENT)
Dept: LAB | Facility: HOSPITAL | Age: 36
End: 2021-07-29

## 2021-07-29 ENCOUNTER — OFFICE VISIT (OUTPATIENT)
Dept: INTERNAL MEDICINE | Facility: CLINIC | Age: 36
End: 2021-07-29

## 2021-07-29 VITALS
BODY MASS INDEX: 27.55 KG/M2 | HEIGHT: 69 IN | DIASTOLIC BLOOD PRESSURE: 76 MMHG | HEART RATE: 86 BPM | SYSTOLIC BLOOD PRESSURE: 134 MMHG | OXYGEN SATURATION: 98 % | WEIGHT: 186 LBS

## 2021-07-29 DIAGNOSIS — R53.82 CHRONIC FATIGUE: Primary | ICD-10-CM

## 2021-07-29 DIAGNOSIS — L98.9 SKIN LESION: ICD-10-CM

## 2021-07-29 DIAGNOSIS — R53.82 CHRONIC FATIGUE: ICD-10-CM

## 2021-07-29 LAB — PROLACTIN SERPL-MCNC: 0.19 NG/ML (ref 4.04–15.2)

## 2021-07-29 PROCEDURE — 36415 COLL VENOUS BLD VENIPUNCTURE: CPT

## 2021-07-29 PROCEDURE — 99213 OFFICE O/P EST LOW 20 MIN: CPT

## 2021-07-29 PROCEDURE — 84402 ASSAY OF FREE TESTOSTERONE: CPT

## 2021-07-29 PROCEDURE — 84403 ASSAY OF TOTAL TESTOSTERONE: CPT

## 2021-07-29 PROCEDURE — 84146 ASSAY OF PROLACTIN: CPT

## 2021-07-29 NOTE — PROGRESS NOTES
Chief Complaint  Fatigue and Referral to dermatology    Sunnydylan Drummond presents to [facility]    PCP: YEE Lopes    Recently quit taking SARM supplementation. Has noticed some increased fatigue since discontinuing use. Requesting to have his testosterone and prolactin checked.     Also requesting a referral to dermatology to have Genital warts removed. Tried Imiquimod cream in the past with no improvement.     Subjective       [facility]  The following portions of the patient's history were reviewed and updated as appropriate: allergies, current medications, past family history, past medical history, past social history, past surgical history and problem list.     Past Medical History:   Diagnosis Date   • Chronic back pain    • Insomnia    • Male hypertestosteronemia       No Known Allergies   Social History     Tobacco Use   • Smoking status: Current Some Day Smoker     Packs/day: 0.00     Years: 0.00     Pack years: 0.00     Types: Cigarettes     Last attempt to quit: 3/2/2018     Years since quitting: 3.4   • Smokeless tobacco: Current User     Types: Snuff   • Tobacco comment: just when drinking   Vaping Use   • Vaping Use: Some days   • Substances: Nicotine   Substance Use Topics   • Alcohol use: Yes     Comment: occasionally   • Drug use: No     Past Surgical History:   Procedure Laterality Date   • FINGER SURGERY Right       Family History   Problem Relation Age of Onset   • Diabetes Mother    • Heart disease Maternal Grandfather    • Alcohol abuse Maternal Grandfather    • Cancer Father    • Alcohol abuse Father          Current Outpatient Medications:   •  finasteride (PROPECIA) 1 MG tablet, Take 1 tablet by mouth Daily., Disp: 30 tablet, Rfl: 5  •  sildenafil (VIAGRA) 100 MG tablet, Take 1 tablet by mouth Daily As Needed for Erectile Dysfunction. Do not exceed more than 4 times per week., Disp: 16 tablet, Rfl: 2  •  traZODone (DESYREL) 100 MG tablet, Take 1 tablet by  "mouth Every Night., Disp: 30 tablet, Rfl: 5  •  aspirin (aspirin) 81 MG EC tablet, Take 1 tablet by mouth Daily., Disp: 30 tablet, Rfl: 5  •  cabergoline (DOSTINEX) 0.5 MG tablet, Take 1 tablet by mouth 2 (Two) Times a Week for 336 days., Disp: 8 tablet, Rfl: 11    Review of Systems   Constitutional: Positive for fatigue. Negative for activity change, appetite change, fever, unexpected weight gain and unexpected weight loss.   Respiratory: Negative for cough, choking, chest tightness and shortness of breath.    Cardiovascular: Negative for chest pain, palpitations and leg swelling.   Genitourinary: Negative for difficulty urinating, dysuria, flank pain, frequency and genital sores.   Neurological: Negative for facial asymmetry, light-headedness, headache and memory problem.       Objective   Vital Signs  /76   Pulse 86   Ht 174 cm (68.5\")   Wt 84.4 kg (186 lb)   SpO2 98%   BMI 27.87 kg/m²     Physical Exam  Vitals and nursing note reviewed.   Constitutional:       Appearance: Normal appearance.   HENT:      Head: Normocephalic and atraumatic.      Nose: Nose normal.   Eyes:      General:         Right eye: No discharge.      Extraocular Movements: Extraocular movements intact.      Conjunctiva/sclera: Conjunctivae normal.      Pupils: Pupils are equal, round, and reactive to light.   Cardiovascular:      Rate and Rhythm: Normal rate.      Pulses: Normal pulses.      Heart sounds: Normal heart sounds.   Pulmonary:      Effort: Pulmonary effort is normal.      Breath sounds: Normal breath sounds.   Abdominal:      General: Abdomen is flat.   Musculoskeletal:         General: Normal range of motion.      Cervical back: Normal range of motion and neck supple.   Skin:     General: Skin is warm and dry.      Capillary Refill: Capillary refill takes less than 2 seconds.   Neurological:      Mental Status: He is alert and oriented to person, place, and time.   Psychiatric:         Mood and Affect: Mood normal.    "      Behavior: Behavior normal.         Thought Content: Thought content normal.         Judgment: Judgment normal.          Result Review :       Assessment and Plan    1. Chronic fatigue  - Prolactin; Future  - Testosterone, Free, Total; Future  -Will obtain labs today and address as appropriate.   2. Skin lesion  - Ambulatory Referral to Dermatology    Follow Up     No follow-ups on file.    Patient was given instructions and counseling regarding his condition or for health maintenance advice. Please see specific information pulled into the AVS if appropriate.    Part of this note may be an electronic transcription/translation of spoken language to printed text using the Dragon Dictation System.    Electronically signed by:   YEE Almonte  07/29/2021

## 2021-08-02 ENCOUNTER — TELEPHONE (OUTPATIENT)
Dept: INTERNAL MEDICINE | Facility: CLINIC | Age: 36
End: 2021-08-02

## 2021-08-02 NOTE — TELEPHONE ENCOUNTER
Caller: Sunny Drummond    Relationship: Self    Best call back number: [phone]     Caller requesting test results: SELF    What test was performed: LABS    When was the test performed: LAST WEEK    Where was the test performed: [facility]    Additional notes: PATIENT ALSO CALLED TO REQUEST A NEW REFERRAL FOR PHYSICAL THERAPY.  PATIENT STATED THAT HIS ORDERS HAVE .

## 2021-08-05 ENCOUNTER — TELEPHONE (OUTPATIENT)
Dept: INTERNAL MEDICINE | Facility: CLINIC | Age: 36
End: 2021-08-05

## 2021-08-05 LAB
TESTOST FREE SERPL-MCNC: 12.1 PG/ML (ref 8.7–25.1)
TESTOST SERPL-MCNC: 319 NG/DL (ref 264–916)

## 2021-08-05 NOTE — TELEPHONE ENCOUNTER
Caller: Sunny Drummond    Relationship: Self    Best call back number: [phone]    What test was performed:   TESTOSTERONE   PROLACTIN    When was the test performed: 07/29/2021    Additional notes:   PATIENT WOULD LIKE A CALL BACK REGARDING THE RESULTS OF HIS LABS DRAWN 07/29/2021

## 2021-08-25 ENCOUNTER — OFFICE VISIT (OUTPATIENT)
Dept: INTERNAL MEDICINE | Facility: CLINIC | Age: 36
End: 2021-08-25

## 2021-08-25 VITALS
SYSTOLIC BLOOD PRESSURE: 118 MMHG | WEIGHT: 181 LBS | TEMPERATURE: 97.4 F | BODY MASS INDEX: 27.43 KG/M2 | HEART RATE: 81 BPM | DIASTOLIC BLOOD PRESSURE: 66 MMHG | HEIGHT: 68 IN | OXYGEN SATURATION: 97 %

## 2021-08-25 DIAGNOSIS — M25.641 DECREASED RANGE OF MOTION OF FINGER OF RIGHT HAND: ICD-10-CM

## 2021-08-25 DIAGNOSIS — F51.01 PRIMARY INSOMNIA: ICD-10-CM

## 2021-08-25 DIAGNOSIS — M79.644 PAIN IN FINGER OF RIGHT HAND: Primary | ICD-10-CM

## 2021-08-25 PROCEDURE — 99213 OFFICE O/P EST LOW 20 MIN: CPT | Performed by: NURSE PRACTITIONER

## 2021-08-25 NOTE — PROGRESS NOTES
Chief Complaint   Patient presents with   • Finger Injury   • Insomnia     follow up       History of Present Illness    36 y.o.male presents for finger injury and insomnia  over a year ago had finger injury to right hand 5th digit; laceration. Had to have surgery to remove metal. Was told had some nerve damage. still having issues with that finger and getting more bothersome, numbness and occational pain, loss of strength and frequent tremor. Thinks prior surgery was done by Stephen. Pt requesting to see different hand specialist.    Insomnia chronic. trazadone helps sleep but works quick and then feels like makes him anxious.     Review of Systems   Musculoskeletal: Positive for arthralgias.   Neurological: Positive for weakness and numbness.   Psychiatric/Behavioral: Positive for sleep disturbance.         [facility]  The following portions of the patient's history were reviewed and updated as appropriate: allergies, current medications, past family history, past medical history, past social history, past surgical history and problem list.     Past Medical History:   Diagnosis Date   • Chronic back pain    • Insomnia    • Male hypertestosteronemia       No Known Allergies   Social History     Tobacco Use   • Smoking status: Former Smoker     Packs/day: 0.25     Years: 10.00     Pack years: 2.50     Types: Cigarettes     Quit date: 3/2/2018     Years since quitting: 3.4   • Smokeless tobacco: Former User     Types: Snuff   • Tobacco comment: just when drinking   Vaping Use   • Vaping Use: Some days   • Substances: Nicotine   Substance Use Topics   • Alcohol use: Yes     Comment: occasionally   • Drug use: No     Past Surgical History:   Procedure Laterality Date   • FINGER SURGERY Right       Family History   Problem Relation Age of Onset   • Diabetes Mother    • Heart disease Maternal Grandfather    • Alcohol abuse Maternal Grandfather    • Cancer Father    • Alcohol abuse Father            Current Outpatient  "Medications:   •  cabergoline (DOSTINEX) 0.5 MG tablet, Take 1 tablet by mouth 2 (Two) Times a Week for 336 days., Disp: 8 tablet, Rfl: 11  •  finasteride (PROPECIA) 1 MG tablet, Take 1 tablet by mouth Daily., Disp: 30 tablet, Rfl: 5  •  sildenafil (VIAGRA) 100 MG tablet, Take 1 tablet by mouth Daily As Needed for Erectile Dysfunction. Do not exceed more than 4 times per week., Disp: 16 tablet, Rfl: 2  •  traZODone (DESYREL) 100 MG tablet, Take 1 tablet by mouth Every Night., Disp: 30 tablet, Rfl: 5    VITALS:  /66   Pulse 81   Temp 97.4 °F (36.3 °C)   Ht 172.7 cm (68\")   Wt 82.1 kg (181 lb)   SpO2 97%   BMI 27.52 kg/m²     Physical Exam  Musculoskeletal:      Right hand: No tenderness. Decreased range of motion. Normal capillary refill. Normal pulse.      Comments: Right hand fifth digit with healed scar; no swelling. Positive decreased ROM DIP PIP joints, tremor of that finger.   Skin:     General: Skin is warm and dry.   Neurological:      Mental Status: He is alert and oriented to person, place, and time.   Psychiatric:         Mood and Affect: Mood normal.           Assessment and Plan    Diagnoses and all orders for this visit:    1. Pain in finger of right hand (Primary)  -     Ambulatory Referral to Orthopedic Surgery    2. Decreased range of motion of finger of right hand  -     Ambulatory Referral to Orthopedic Surgery    3. Primary insomnia  Comments:  try decreasing dose trazadone; only take half tab initially. if needs other half in middle of night ok to take.        I discussed the patients findings and my recommendations with patient.  Patient was encouraged to keep me informed of any acute changes, lack of improvement, or any new concerning symptoms.  Patient voiced understanding of all instructions and denied further questions.      Follow Up   Return if symptoms worsen or fail to improve.      Electronically signed by:    YEE Hardy  08/25/2021        "

## 2021-08-26 ENCOUNTER — TELEPHONE (OUTPATIENT)
Dept: INTERNAL MEDICINE | Facility: CLINIC | Age: 36
End: 2021-08-26

## 2021-08-26 NOTE — TELEPHONE ENCOUNTER
Caller: Sunny Drummond    Relationship: Self    Best call back number: [phone]    What form or medical record are you requesting: WORK EXCUSE FOR 8/23/21    Who is requesting this form or medical record from you: EMPLOYER    How would you like to receive the form or medical records (pick-up, mail, fax):  AT OFFICE    Timeframe paperwork needed: AS SOON AS POSSIBLE    Additional notes: PATIENT WAS SEEN BY NANO BUNCH ON 8/25/21 BUT THE ONLY DAY OF WORK HE MISSED WAS 8/23/21 AND HE WOULD LIKE HIS WORK EXCUSE TO COVER THAT DAY

## 2021-08-27 NOTE — TELEPHONE ENCOUNTER
I have sent a Magoos[employer] message to Ang indicating that we can only produce a work excuse for 08/25/2021 for the appointment in office.

## 2021-09-08 ENCOUNTER — TELEPHONE (OUTPATIENT)
Dept: INTERNAL MEDICINE | Facility: CLINIC | Age: 36
End: 2021-09-08

## 2021-09-08 NOTE — TELEPHONE ENCOUNTER
Caller: Sunny Drummond    Relationship: Self    Best call back number: [phone]    What orders are you requesting (i.e. lab or imaging): BLOOD WORK    In what timeframe would the patient need to come in: TOMORROW 9/8/2021    Where will you receive your lab/imaging services: IN OFFICE     Additional notes: PATIENT WOULD LIKE TO GET A BLOOD WORK ORDER TO CHECK HIS LEVELS

## 2021-09-09 ENCOUNTER — OFFICE VISIT (OUTPATIENT)
Dept: ORTHOPEDIC SURGERY | Facility: CLINIC | Age: 36
End: 2021-09-09

## 2021-09-09 VITALS
WEIGHT: 181 LBS | BODY MASS INDEX: 27.43 KG/M2 | HEART RATE: 84 BPM | HEIGHT: 68 IN | SYSTOLIC BLOOD PRESSURE: 130 MMHG | DIASTOLIC BLOOD PRESSURE: 86 MMHG

## 2021-09-09 DIAGNOSIS — Z72.0: ICD-10-CM

## 2021-09-09 DIAGNOSIS — R20.0 NUMBNESS OF FINGER: ICD-10-CM
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
## 2021-09-09 DIAGNOSIS — M79.644 FINGER PAIN, RIGHT: Primary | ICD-10-CM

## 2021-09-09 PROCEDURE — 99204 OFFICE O/P NEW MOD 45 MIN: CPT | Performed by: PHYSICIAN ASSISTANT

## 2021-09-09 NOTE — PROGRESS NOTES
"    [facility]    Subjective     Chief Complaint   Patient presents with   • Right Hand - Pain        HPI  Sunny Drummond is a 36 y.o. male.  Right-hand-dominant. New patient presents for evaluation of right small finger pain.  Symptoms/pain have been ongoing for 1+ years.  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  He had surgery (by another orthopedic surgeon at another group) but continues to have pain along with numbness to part of the finger, told he might have nerve damage. He also notes decrease strength.    Pain scale: 6/10.  Severity of the pain moderate.  Quality of the pain stabbing, shooting.  Associated symptoms weak .  Activity related to pain movement and numbness.  Pain relieved by nothing.      Denies fever, chills, night sweats or other constitutional symptoms. Nondiabetic, BMI 27.53. Positive snuff.      Past Medical History:   Diagnosis Date   • Chronic back pain    • Insomnia    • Male hypertestosteronemia       Past Surgical History:   Procedure Laterality Date   • FINGER SURGERY Right       Family History   Problem Relation Age of Onset   • Diabetes Mother    • Heart disease Maternal Grandfather    • Alcohol abuse Maternal Grandfather    • Cancer Father    • Alcohol abuse Father      Social History     Socioeconomic History   • Marital status:      Spouse name: Not on file   • Number of children: Not on file   • Years of education: Not on file   • Highest education level: Not on file   Tobacco Use   • Smoking status: Former Smoker     Packs/day: 0.25     Years: 10.00     Pack years: 2.50     Types: Cigarettes     Quit date: 3/2/2018     Years since quitting: 3.5   • Smokeless tobacco: Former User     Types: Snuff   • Tobacco comment: just when drinking   Vaping Use   • Vaping Use: Some days   • Substances: Nicotine   Substance and Sexual Activity   • Alcohol use: Yes     Comment: occasionally   • " "Drug use: No   • Sexual activity: Defer      Current Outpatient Medications on File Prior to Visit   Medication Sig Dispense Refill   • cabergoline (DOSTINEX) 0.5 MG tablet Take 1 tablet by mouth 2 (Two) Times a Week for 336 days. 8 tablet 11   • finasteride (PROPECIA) 1 MG tablet Take 1 tablet by mouth Daily. 30 tablet 5   • sildenafil (VIAGRA) 100 MG tablet Take 1 tablet by mouth Daily As Needed for Erectile Dysfunction. Do not exceed more than 4 times per week. 16 tablet 2   • traZODone (DESYREL) 100 MG tablet Take 1 tablet by mouth Every Night. 30 tablet 5     No current facility-administered medications on file prior to visit.      No Known Allergies     The following portions of the patient's history were reviewed and updated as appropriate: allergies, current medications, past family history, past medical history, past social history, past surgical history and problem list.    Review of Systems   Constitutional: Negative.    HENT: Negative.    Eyes: Negative.    Respiratory: Negative.    Cardiovascular: Negative.    Gastrointestinal: Negative.    Endocrine: Negative.    Genitourinary: Negative.    Musculoskeletal: Positive for arthralgias.   Skin: Negative.    Allergic/Immunologic: Negative.    Neurological: Negative.    Hematological: Negative.    Psychiatric/Behavioral: Negative.         Objective      Physical Exam  /86   Pulse 84   Ht 172.7 cm (67.99\")   Wt 82.1 kg (181 lb)   BMI 27.53 kg/m²     Body mass index is 27.53 kg/m².    GENERAL APPEARANCE: awake, alert & oriented x 3, in no acute distress and well developed, well nourished  PSYCH: normal mood and affect  LUNGS:  breathing nonlabored, no wheezing  EYES: sclera anicteric, pupils equal  CARDIOVASCULAR: palpable pulses. Capillary refill less than 2 seconds  INTEGUMENTARY: skin intact, no clubbing, cyanosis  NEUROLOGIC:  Normal Sensation         Ortho Exam  Right hand/small finger  Skin: Grossly intact without any redness, warmth or " swelling.  Tenderness: Mid to distal aspect of finger.  Sensation:  Intact to ulnar/tip finger; however, numbness to radial aspect finger  Motor: Intact R/U/M/AIN/PIN  FDS, FDP, extensor tendon intact.  Able to make a composite fist.  Vascular: 2+ radial pulse with brisk CR into each digit    No issues to other digits.      Imaging/Studies  Ordered right hand plain films.  Imaging read/interpreted by Dr. Velázquez.    Imaging Results (Last 7 Days)     Procedure Component Value Units Date/Time    XR Hand 3+ View Right [[id number]] Resulted: 09/09/21 0949     Updated: 09/09/21 0950    Narrative:      Indication: Right hand pain    Comparison: No prior xrays are available for comparison      Impression:           Right hand 3 views: Radiographs demonstrate no acute bony injury or   fracture.  There is a presence of foreign metallic material adjacent to   the distal phalanx of the ring and small fingers.  Otherwise normal bony   alignment.          Assessment/Plan        ICD-10-CM ICD-9-CM   1. Finger pain, right  M79.644 729.5   2. Numbness of finger  R20.0 782.0   3. Contact with welding equipment as cause of accidental injury  W31.1XXA E919.3   4. Patient uses snuff  Z72.0 305.1       Orders Placed This Encounter   Procedures   • XR Hand 3+ View Right        -Right small finger pain with numbness to radial aspect secondary to prior welding injury.  No changes despite PT/OT after surgery.  -Refer to hand surgeon for further evaluation and treatment as indicated.  -Recommend OTC pain medication as needed.  -Snuff use--Discussed the importance of smoking cessation, reviewed the adverse effects of tobacco use: increased risk of tendonitis (more difficult to treat and resolve), hardening of the arteries, gangrene, amputation, etc. Included in the counseling were treatments options for cessation: quitting cold turkey, medication, nicotine step-down programs and smoking cessation programs. Furthermore, I explained to the  patient the importance of abstaining from nicotine usage as nicotine is known to increase risk of complications associated with surgery including but not limited to infection, decreased wound healing, fracture/fusion nonunion, slowed soft tissue healing, and increased surgery associated pain. (5 minutes spent counseling patient).  -Follow up our clinic as needed.  -Questions and concerns answered.      Medical Decision Making  Management Options : over-the-counter medicine  Data/Risk: radiology tests          Rhina Pitts PA-C  09/13/21  20:01 EDT               EMR Dragon/Transcription disclaimer:  Much of this encounter note is an electronic transcription of spoken language to printed text. Electronic transcription of spoken language may permit erroneous, or at times, nonsensical words or phrases to be inadvertently transcribed. Although I have reviewed the note for such errors, some may still exist.

## 2021-09-13 DIAGNOSIS — R53.83 FATIGUE, UNSPECIFIED TYPE: Primary | ICD-10-CM

## 2021-09-14 ENCOUNTER — LAB (OUTPATIENT)
Dept: LAB | Facility: HOSPITAL | Age: 36
End: 2021-09-14

## 2021-09-14 DIAGNOSIS — R53.83 FATIGUE, UNSPECIFIED TYPE: ICD-10-CM

## 2021-09-14 LAB — PROLACTIN SERPL-MCNC: 0.27 NG/ML (ref 4.04–15.2)

## 2021-09-14 PROCEDURE — 84403 ASSAY OF TOTAL TESTOSTERONE: CPT

## 2021-09-14 PROCEDURE — 84146 ASSAY OF PROLACTIN: CPT

## 2021-09-14 PROCEDURE — 84402 ASSAY OF FREE TESTOSTERONE: CPT

## 2021-09-17 LAB
TESTOST FREE SERPL-MCNC: 41.2 PG/ML (ref 8.7–25.1)
TESTOST SERPL-MCNC: >1500 NG/DL (ref 264–916)

## 2021-09-17 NOTE — PROGRESS NOTES
Testosterone levels are back up > 1500, free testosterone is up as well. Need to keep FU appt with endocrine.

## 2021-11-02 ENCOUNTER — TELEMEDICINE (OUTPATIENT)
Dept: INTERNAL MEDICINE | Facility: CLINIC | Age: 36
End: 2021-11-02

## 2021-11-02 DIAGNOSIS — K52.9 GASTROENTERITIS: ICD-10-CM

## 2021-11-02 DIAGNOSIS — R19.7 DIARRHEA, UNSPECIFIED TYPE: Primary | ICD-10-CM

## 2021-11-02 PROCEDURE — 99214 OFFICE O/P EST MOD 30 MIN: CPT

## 2021-11-02 NOTE — PROGRESS NOTES
You have chosen to receive care through a telehealth visit.  Do you consent to use a video/audio connection for your medical care today? Yes   Patient's location was verified as being within the [facility].     Chief Complaint  Sunny Drummond is a 36 y.o. male who presents via video-conference for lower back pain, diarrhea, fatigue, and stomach cramping x 2 days.     History of Present Illness:    Low back pain, loss of appetite, diarrhea, fatigue, and stomach cramping x 2 days. No mechanism of injury reported. Back pain occurring in the bilateral lumbar region and is described as achy and sharp. No tenderness to palpation. Reports having 3-4 loose bowel movements today. No blood or pus reported in the stool. Has not tried any medications as of yet. Pain is rated as a 4 out of 10.     The following portions of the patient's history were reviewed and updated as appropriate: allergies, current medications, past family history, past medical history, past social history, past surgical history and problem list.    [facility]  The following portions of the patient's history were reviewed and updated as appropriate: allergies, current medications, past family history, past medical history, past social history, past surgical history and problem list.     Past Medical History:   Diagnosis Date   • Chronic back pain    • Insomnia    • Male hypertestosteronemia       No Known Allergies   Social History     Tobacco Use   • Smoking status: Former Smoker     Packs/day: 0.25     Years: 10.00     Pack years: 2.50     Types: Cigarettes     Quit date: 3/2/2018     Years since quitting: 3.6   • Smokeless tobacco: Former User     Types: Snuff   • Tobacco comment: just when drinking   Vaping Use   • Vaping Use: Some days   • Substances: Nicotine   Substance Use Topics   • Alcohol use: Yes     Comment: occasionally   • Drug use: No     Past Surgical History:   Procedure Laterality Date   • FINGER SURGERY Right       Family History    Problem Relation Age of Onset   • Diabetes Mother    • Heart disease Maternal Grandfather    • Alcohol abuse Maternal Grandfather    • Cancer Father    • Alcohol abuse Father          Current Outpatient Medications:   •  cabergoline (DOSTINEX) 0.5 MG tablet, Take 1 tablet by mouth 2 (Two) Times a Week for 336 days., Disp: 8 tablet, Rfl: 11  •  finasteride (PROPECIA) 1 MG tablet, Take 1 tablet by mouth Daily., Disp: 30 tablet, Rfl: 5  •  sildenafil (VIAGRA) 100 MG tablet, Take 1 tablet by mouth Daily As Needed for Erectile Dysfunction. Do not exceed more than 4 times per week., Disp: 16 tablet, Rfl: 2  •  traZODone (DESYREL) 100 MG tablet, Take 1 tablet by mouth Every Night., Disp: 30 tablet, Rfl: 5    Review of Systems   Constitutional: Positive for appetite change and fatigue. Negative for activity change, diaphoresis and fever.   Respiratory: Negative for apnea, cough, choking, chest tightness, shortness of breath, wheezing and stridor.    Cardiovascular: Negative for chest pain, palpitations and leg swelling.   Gastrointestinal: Positive for diarrhea. Negative for abdominal distention, abdominal pain, anal bleeding, blood in stool, constipation, nausea, rectal pain, vomiting, GERD and indigestion.   Musculoskeletal: Positive for arthralgias, back pain and myalgias. Negative for gait problem, joint swelling, neck pain, neck stiffness and bursitis.       Objective  Vital signs available: No      Assessment/Plan   1. Diarrhea, unspecified type  - COVID-19 PCR, [facility], NP SWAB IN [facility]SPORT MEDIA/ORAL SWISH 24-30 HR TAT - Swab, Nasopharynx; Future  - Encouraged to self-quarantine until COVID results are made available.     2. Gastroenteritis  Advised in oral rehydration therapy with water and electrolyte replacement. Encouraged bland diet and avoidance of offending foods. OTC anti-diarrheals recommended sparingly. Encouraged in proper hygiene measures prior to preparing and consuming foods and after  bowel or bladder habits to avoid cross contamination to others in household. Advised in typical duration of 2-4 days for resolution of viral gastroenteritis symptoms. Encouraged to seek further follow-up in office of ER treatment after hours for worsening symptoms and/or inability to keep food/fluids down for 24 hours. Follow-up in 2 days if no improvement or worsening.      Return for Next scheduled follow up.    I spent 36 minutes caring for Sunny on this date of service. This time includes time spent by me in the following activities:preparing for the visit, reviewing tests, obtaining and/or reviewing a separately obtained history, counseling and educating the patient/family/caregiver, ordering medications, tests, or procedures, referring and communicating with other health care professionals , documenting information in the medical record and care coordination    Part of this note may be an electronic transcription/translation of spoken language to printed text using the Dragon Dictation System.    Electronically signed by:      YEE Almonte  11/02/2021

## 2021-11-05 ENCOUNTER — TELEPHONE (OUTPATIENT)
Dept: INTERNAL MEDICINE | Facility: CLINIC | Age: 36
End: 2021-11-05

## 2021-11-05 ENCOUNTER — TELEMEDICINE (OUTPATIENT)
Dept: INTERNAL MEDICINE | Facility: CLINIC | Age: 36
End: 2021-11-05

## 2021-11-05 DIAGNOSIS — R11.0 NAUSEA: ICD-10-CM

## 2021-11-05 DIAGNOSIS — R19.7 DIARRHEA, UNSPECIFIED TYPE: Primary | ICD-10-CM

## 2021-11-05 PROCEDURE — 99213 OFFICE O/P EST LOW 20 MIN: CPT

## 2021-11-05 RX ORDER — ONDANSETRON 4 MG/1
4 TABLET, FILM COATED ORAL EVERY 8 HOURS PRN
Qty: 21 TABLET | Refills: 0 | Status: SHIPPED | OUTPATIENT
Start: 2021-11-05 | End: 2022-04-08

## 2021-11-05 NOTE — PROGRESS NOTES
You have chosen to receive care through a telehealth visit.  Do you consent to use a video/audio connection for your medical care today? Yes   Patient's location was verified as being within the [facility].     Chief Complaint  Sunny Drummond is a 36 y.o. male who presents via video-conference for nausea, vomiting, and diarrhea x 5 days.      History of Present Illness  Nausea, vomiting, and diarrhea x 5 days. Had a visit on  with similar symptoms which has not improved. Reports eating some shrimp that may have been  before symptom onset. Having 4-5 bowel movements per day that are liquid and watery with associated generalized stomach cramping. Has tried taking pepto bismol with mild relief in symptoms. No recent antibiotic usage. Pain is rated as a 6 out of 10.       The following portions of the patient's history were reviewed and updated as appropriate: allergies, current medications, past family history, past medical history, past social history, past surgical history and problem list.    [facility]  The following portions of the patient's history were reviewed and updated as appropriate: allergies, current medications, past family history, past medical history, past social history, past surgical history and problem list.     Past Medical History:   Diagnosis Date   • Chronic back pain    • Insomnia    • Male hypertestosteronemia       No Known Allergies   Social History     Tobacco Use   • Smoking status: Former Smoker     Packs/day: 0.25     Years: 10.00     Pack years: 2.50     Types: Cigarettes     Quit date: 3/2/2018     Years since quitting: 3.6   • Smokeless tobacco: Former User     Types: Snuff   • Tobacco comment: just when drinking   Vaping Use   • Vaping Use: Some days   • Substances: Nicotine   Substance Use Topics   • Alcohol use: Yes     Comment: occasionally   • Drug use: No     Past Surgical History:   Procedure Laterality Date   • FINGER SURGERY Right       Family History   Problem  Relation Age of Onset   • Diabetes Mother    • Heart disease Maternal Grandfather    • Alcohol abuse Maternal Grandfather    • Cancer Father    • Alcohol abuse Father          Current Outpatient Medications:   •  cabergoline (DOSTINEX) 0.5 MG tablet, Take 1 tablet by mouth 2 (Two) Times a Week for 336 days., Disp: 8 tablet, Rfl: 11  •  finasteride (PROPECIA) 1 MG tablet, Take 1 tablet by mouth Daily., Disp: 30 tablet, Rfl: 5  •  ondansetron (Zofran) 4 MG tablet, Take 1 tablet by mouth Every 8 (Eight) Hours As Needed for Nausea or Vomiting., Disp: 21 tablet, Rfl: 0  •  sildenafil (VIAGRA) 100 MG tablet, Take 1 tablet by mouth Daily As Needed for Erectile Dysfunction. Do not exceed more than 4 times per week., Disp: 16 tablet, Rfl: 2  •  traZODone (DESYREL) 100 MG tablet, Take 1 tablet by mouth Every Night., Disp: 30 tablet, Rfl: 5    Review of Systems   Constitutional: Negative for chills, fatigue and fever.   Respiratory: Negative for apnea, cough, choking, chest tightness, shortness of breath, wheezing and stridor.    Cardiovascular: Negative for chest pain, palpitations and leg swelling.   Gastrointestinal: Positive for abdominal pain, diarrhea and nausea. Negative for abdominal distention, anal bleeding, blood in stool, constipation, rectal pain, vomiting, GERD and indigestion.   Skin: Negative for color change, dry skin, pallor, rash, skin lesions and bruise.       Objective  Vital signs available: No      Assessment/Plan   1. Diarrhea, unspecified type  - Gastrointestinal Panel, PCR - Stool, Per Rectum; Future  - Patient still has COVID test ordered that was never collected. Encouraged patient to have COVID test performed if possible.   - Advised in oral rehydration therapy with water and electrolyte replacement.   -Encouraged bland diet and avoidance of offending foods. OTC anti-diarrheals recommended sparingly.   -Encouraged in proper hygiene measures prior to preparing and consuming foods and after bowel or  bladder habits to avoid cross contamination to others in household.   -Advised in typical duration of 2-4 days for resolution of viral gastroenteritis symptoms.   - Encouraged to seek further follow-up in office of ER treatment after hours for worsening symptoms and/or inability to keep food/fluids down for 24 hours.     2. Nausea  - ondansetron (Zofran) 4 MG tablet; Take 1 tablet by mouth Every 8 (Eight) Hours As Needed for Nausea or Vomiting.  Dispense: 21 tablet; Refill: 0      Return for Next scheduled follow up.    Part of this note may be an electronic transcription/translation of spoken language to printed text using the Dragon Dictation System.    Electronically signed by:      YEE Almonte  11/05/2021

## 2021-11-05 NOTE — TELEPHONE ENCOUNTER
Caller: Sunny Drummond    Relationship: Self    Best call back number: [phone]    Who are you requesting to speak with (clinical staff, provider,  specific staff member): CLINICAL STAFF    What was the call regarding: PATIENT STATED HE WAS SUPPOSED TO COME IN TO DO LAB WORK FOR [facility] TODAY BUT WAS UNABLE TO MAKE IT IN. PATIENT WOULD LIKE TO KNOW IF MEDICATION COULD BE CALLED IN FOR HIM EVEN THOUGH HE DID NOT DO THE LAB WORK.    Do you require a callback: YES      [place] [address] - [place], [place] - [address] [facility] [address] - [phone]  - [phone] FX

## 2021-11-17 DIAGNOSIS — N52.8 OTHER MALE ERECTILE DYSFUNCTION: ICD-10-CM

## 2021-11-17 RX ORDER — SILDENAFIL 100 MG/1
TABLET, FILM COATED ORAL
Qty: 16 TABLET | Refills: 2 | Status: SHIPPED | OUTPATIENT
Start: 2021-11-17 | End: 2022-04-08 | Stop reason: SDUPTHER

## 2021-12-22 ENCOUNTER — TELEPHONE (OUTPATIENT)
Dept: INTERNAL MEDICINE | Facility: CLINIC | Age: 36
End: 2021-12-22

## 2021-12-22 NOTE — TELEPHONE ENCOUNTER
Caller: Sunny Drummond    Relationship: Self    Best call back number: [phone]    What orders are you requesting: LABS    In what timeframe would the patient need to come in: ASAP    Where will you receive your lab/imaging services: [facility]    Additional notes: PATIENT IS REQUESTING TO GET LABS CHECKED DUE TO THE SUPPLEMENTS THAT HE TAKES FOR WORKING OUT TO MAKE SURE LEVELS ARE GOOD. TESTOSTERONE, ETC

## 2021-12-29 ENCOUNTER — OFFICE VISIT (OUTPATIENT)
Dept: INTERNAL MEDICINE | Facility: CLINIC | Age: 36
End: 2021-12-29

## 2021-12-29 VITALS
SYSTOLIC BLOOD PRESSURE: 118 MMHG | OXYGEN SATURATION: 96 % | TEMPERATURE: 99.2 F | HEART RATE: 84 BPM | WEIGHT: 162.7 LBS | BODY MASS INDEX: 24.74 KG/M2 | DIASTOLIC BLOOD PRESSURE: 80 MMHG

## 2021-12-29 DIAGNOSIS — F41.9 ANXIETY: ICD-10-CM

## 2021-12-29 DIAGNOSIS — F51.01 PRIMARY INSOMNIA: ICD-10-CM

## 2021-12-29 DIAGNOSIS — R19.7 DIARRHEA, UNSPECIFIED TYPE: Primary | ICD-10-CM

## 2021-12-29 DIAGNOSIS — E22.1 HYPERPROLACTINEMIA: ICD-10-CM

## 2021-12-29 PROCEDURE — 99214 OFFICE O/P EST MOD 30 MIN: CPT | Performed by: NURSE PRACTITIONER

## 2021-12-29 RX ORDER — MULTIVITAMIN
1 CAPSULE ORAL DAILY
COMMUNITY
End: 2022-05-06

## 2021-12-29 RX ORDER — AMITRIPTYLINE HYDROCHLORIDE 10 MG/1
TABLET, FILM COATED ORAL
Qty: 60 TABLET | Refills: 0 | Status: SHIPPED | OUTPATIENT
Start: 2021-12-29 | End: 2022-04-08

## 2021-12-29 RX ORDER — DICYCLOMINE HYDROCHLORIDE 10 MG/1
10 CAPSULE ORAL
Qty: 90 CAPSULE | Refills: 0 | Status: SHIPPED | OUTPATIENT
Start: 2021-12-29 | End: 2022-04-08

## 2021-12-30 ENCOUNTER — LAB (OUTPATIENT)
Dept: LAB | Facility: HOSPITAL | Age: 36
End: 2021-12-30

## 2021-12-30 DIAGNOSIS — E22.1 HYPERPROLACTINEMIA: ICD-10-CM

## 2021-12-30 DIAGNOSIS — F41.9 ANXIETY: ICD-10-CM

## 2021-12-30 DIAGNOSIS — R19.7 DIARRHEA, UNSPECIFIED TYPE: ICD-10-CM

## 2021-12-30 LAB
ALBUMIN SERPL-MCNC: 4 G/DL (ref 3.5–5.2)
ALBUMIN/GLOB SERPL: 1.3 G/DL
ALP SERPL-CCNC: 48 U/L (ref 39–117)
ALT SERPL W P-5'-P-CCNC: 51 U/L (ref 1–41)
ANION GAP SERPL CALCULATED.3IONS-SCNC: 12.3 MMOL/L (ref 5–15)
AST SERPL-CCNC: 46 U/L (ref 1–40)
BILIRUB SERPL-MCNC: 0.3 MG/DL (ref 0–1.2)
BUN SERPL-MCNC: 10 MG/DL (ref 6–20)
BUN/CREAT SERPL: 9.3 (ref 7–25)
CALCIUM SPEC-SCNC: 9.1 MG/DL (ref 8.6–10.5)
CHLORIDE SERPL-SCNC: 102 MMOL/L (ref 98–107)
CO2 SERPL-SCNC: 23.7 MMOL/L (ref 22–29)
CREAT SERPL-MCNC: 1.07 MG/DL (ref 0.76–1.27)
DEPRECATED RDW RBC AUTO: 42.5 FL (ref 37–54)
ERYTHROCYTE [DISTWIDTH] IN BLOOD BY AUTOMATED COUNT: 14.1 % (ref 12.3–15.4)
ERYTHROCYTE [SEDIMENTATION RATE] IN BLOOD: 49 MM/HR (ref 0–15)
GFR SERPL CREATININE-BSD FRML MDRD: 78 ML/MIN/1.73
GLOBULIN UR ELPH-MCNC: 3.2 GM/DL
GLUCOSE SERPL-MCNC: 99 MG/DL (ref 65–99)
HCT VFR BLD AUTO: 42.2 % (ref 37.5–51)
HGB BLD-MCNC: 14.2 G/DL (ref 13–17.7)
LIPASE SERPL-CCNC: 35 U/L (ref 13–60)
MCH RBC QN AUTO: 28.3 PG (ref 26.6–33)
MCHC RBC AUTO-ENTMCNC: 33.6 G/DL (ref 31.5–35.7)
MCV RBC AUTO: 84.2 FL (ref 79–97)
PLATELET # BLD AUTO: 641 10*3/MM3 (ref 140–450)
PMV BLD AUTO: 10.4 FL (ref 6–12)
POTASSIUM SERPL-SCNC: 4.7 MMOL/L (ref 3.5–5.2)
PROT SERPL-MCNC: 7.2 G/DL (ref 6–8.5)
RBC # BLD AUTO: 5.01 10*6/MM3 (ref 4.14–5.8)
SODIUM SERPL-SCNC: 138 MMOL/L (ref 136–145)
WBC NRBC COR # BLD: 12.23 10*3/MM3 (ref 3.4–10.8)

## 2021-12-30 PROCEDURE — 84403 ASSAY OF TOTAL TESTOSTERONE: CPT

## 2021-12-30 PROCEDURE — 83516 IMMUNOASSAY NONANTIBODY: CPT

## 2021-12-30 PROCEDURE — 82533 TOTAL CORTISOL: CPT

## 2021-12-30 PROCEDURE — 85652 RBC SED RATE AUTOMATED: CPT

## 2021-12-30 PROCEDURE — 82024 ASSAY OF ACTH: CPT

## 2021-12-30 PROCEDURE — 80053 COMPREHEN METABOLIC PANEL: CPT

## 2021-12-30 PROCEDURE — 84402 ASSAY OF FREE TESTOSTERONE: CPT

## 2021-12-30 PROCEDURE — 82784 ASSAY IGA/IGD/IGG/IGM EACH: CPT

## 2021-12-30 PROCEDURE — 84146 ASSAY OF PROLACTIN: CPT

## 2021-12-30 PROCEDURE — 85027 COMPLETE CBC AUTOMATED: CPT

## 2021-12-30 PROCEDURE — 86255 FLUORESCENT ANTIBODY SCREEN: CPT

## 2021-12-30 PROCEDURE — 83690 ASSAY OF LIPASE: CPT

## 2021-12-31 LAB
CORTIS SERPL-MCNC: 14.49 MCG/DL
ENDOMYSIUM IGA SER QL: NEGATIVE
GLIADIN PEPTIDE IGA SER-ACNC: 6 UNITS (ref 0–19)
GLIADIN PEPTIDE IGG SER-ACNC: 1 UNITS (ref 0–19)
IGA SERPL-MCNC: 194 MG/DL (ref 90–386)
PROLACTIN SERPL-MCNC: 0.26 NG/ML (ref 4.04–15.2)
TTG IGA SER-ACNC: <2 U/ML (ref 0–3)
TTG IGG SER-ACNC: <2 U/ML (ref 0–5)

## 2022-01-01 LAB — ACTH PLAS-MCNC: 11.8 PG/ML (ref 7.2–63.3)

## 2022-01-02 LAB
TESTOST FREE SERPL-MCNC: >50 PG/ML (ref 8.7–25.1)
TESTOST SERPL-MCNC: >1500 NG/DL (ref 264–916)

## 2022-01-04 NOTE — PROGRESS NOTES
Testosterone levels are still high. Normal cortisol. Negative celiac gluten intolerance screening. Prolactin levels low. Electrolytes ok. Liver enzymes slightly elevated but stable. Normal pancrease labs. Sed rate elevated showing inflammation and wbc elevated. With you stomach symptoms likely have some gastroenteritis or stomach flu type sx. If no improvement in gi symptoms please let me know.

## 2022-01-10 ENCOUNTER — TELEPHONE (OUTPATIENT)
Dept: INTERNAL MEDICINE | Facility: CLINIC | Age: 37
End: 2022-01-10

## 2022-01-10 ENCOUNTER — LAB (OUTPATIENT)
Dept: LAB | Facility: HOSPITAL | Age: 37
End: 2022-01-10

## 2022-01-10 DIAGNOSIS — R19.7 DIARRHEA, UNSPECIFIED TYPE: ICD-10-CM

## 2022-01-10 PROCEDURE — 83013 H PYLORI (C-13) BREATH: CPT

## 2022-01-10 NOTE — TELEPHONE ENCOUNTER
Caller: Sunny Drummond    Relationship: Self    Best call back number: [phone]    What medications are you currently taking:   Current Outpatient Medications on File Prior to Visit   Medication Sig Dispense Refill   • amitriptyline (ELAVIL) 10 MG tablet Take 1 to 3 tab by mouth nightly as needed for sleep 60 tablet 0   • cabergoline (DOSTINEX) 0.5 MG tablet Take 1 tablet by mouth 2 (Two) Times a Week for 336 days. 8 tablet 11   • dicyclomine (Bentyl) 10 MG capsule Take 1 capsule by mouth 4 (Four) Times a Day Before Meals & at Bedtime. 90 capsule 0   • finasteride (PROPECIA) 1 MG tablet Take 1 tablet by mouth Daily. 30 tablet 5   • Multiple Vitamin (multivitamin) capsule Take 1 capsule by mouth Daily.     • ondansetron (Zofran) 4 MG tablet Take 1 tablet by mouth Every 8 (Eight) Hours As Needed for Nausea or Vomiting. 21 tablet 0   • sildenafil (VIAGRA) 100 MG tablet TAKE ONE TABLET BY MOUTH DAILY AS NEEDED FOR ERECTILE DYSFUNCTION ...DO NOT EXCEED MORE THAN 4 TIMES A WEEK 16 tablet 2     No current facility-administered medications on file prior to visit.              Which medication are you concerned about: JOANN     Who prescribed you this medication: NEVILLE BUNCH     What are your concerns: THE PATIENT STATES THAT THE MEDICATION IS NOT HELPING AND WHEN HE WAKES UP HE IS VERY DROWSY THE PATIENT WOULD LIKE TO KNOW IF THE DOCTOR WOULD LIKE TO CHANGE THE MEDICATION HE WOULD LIKE TO DISCUSS USING AMBIEN OR WHATEVER THE DOCTOR THINKS IS BEST THE STATES THAT HE IS PICKING UP A  MEDICATION CALLED CALM TODAY

## 2022-01-12 LAB — UREA BREATH TEST QL: NEGATIVE

## 2022-01-12 NOTE — TELEPHONE ENCOUNTER
Not going to prescribe ambien; controlled medication.  He wanted to come off the trazadone due to dry mouth side effect. If he wants to go back to that let me know.  He can try melatonin for sleep; up to 10mg nightly for sleep.

## 2022-01-13 NOTE — TELEPHONE ENCOUNTER
Hub staff attempted to follow warm transfer process and was unsuccessful     Caller: Sunny Drummond    Relationship to patient: Self    Best call back number: [phone]    Patient is needing: PATIENT WAS RETURNING A CALL TO TATY ABOUT HIS MEDICATION CHANGE.

## 2022-01-25 ENCOUNTER — TELEPHONE (OUTPATIENT)
Dept: INTERNAL MEDICINE | Facility: CLINIC | Age: 37
End: 2022-01-25

## 2022-01-25 NOTE — TELEPHONE ENCOUNTER
Caller: Sunny Drummond    Relationship: Self    Best call back number: [phone]     What medication are you requesting: PRILIGY    What are your current symptoms: IRRECTILE DISFUNCTION    How long have you been experiencing symptoms:     If a prescription is needed, what is your preferred pharmacy and phone number: [facility] [address] - [place], [place] - [address] AT [facility] [address]  - [phone]  - [phone] FX     Additional notes: PATIENT STATED HE TRIED NOT TAKING THE finasteride AND THE PROBLEM WENT AWAY BUT IN DOING RESEARCH HE FOUND THAT THE PRILIGY CAN HELP ALONG WITH THE FINASTERIDE.    ALSO, HE FOUND [facility] SLEEP SUPPORT FOR HIS SLEEP PROBLEM.

## 2022-01-27 NOTE — TELEPHONE ENCOUNTER
Return call to pawel Hernandez does not show up on our order formulary list.  A general search shows the other name is Dapoxetine, and this is not an option on our order formulary.  Nor is any prescribing information for this med available in my drug references. I am not able to prescribe this medication.

## 2022-01-27 NOTE — TELEPHONE ENCOUNTER
Patient contacted and verbalized understanding and he is thankful you got back with him.    Patient wants to know if there is anyway he could get a PT referral, stated he went yesterday but they would not see him due to no referral from a PCP

## 2022-01-31 ENCOUNTER — TELEPHONE (OUTPATIENT)
Dept: INTERNAL MEDICINE | Facility: CLINIC | Age: 37
End: 2022-01-31

## 2022-01-31 NOTE — TELEPHONE ENCOUNTER
Caller: Sunny Drummond    Relationship: Self    Best call back number: [phone]    What orders are you requesting (i.e. lab or imaging): LABS    In what timeframe would the patient need to come in: ASAP    Where will you receive your lab/imaging services: IN OFFICE     Additional notes: PATIENT IS SUPPOSED TO GET HIS LABS DONE AGAIN BASED ON HIS LAST RESULTS ON 12/30/21. PATENT NEEDS AN ACTIVE LAB ORDER ASAP

## 2022-02-01 NOTE — TELEPHONE ENCOUNTER
Return pt call.  He just had his labs done dec 30th.  Does not need these done again now and I did not recommend this either.  Not much is going to change with these in 30 days.  I would not repeat these until 6 months.  He can make a follow up appt with endocrine if he is having more difficulties.

## 2022-02-02 NOTE — TELEPHONE ENCOUNTER
Called and spoke to pt and he is agreeable to wait but would definitely like to see about getting labs in April (about the 90 day fatou) since stopping all supplements discussed.  Please advise

## 2022-02-02 NOTE — TELEPHONE ENCOUNTER
Called pt and he stated this is for the off and on back pain that he has been seen for in the past.

## 2022-02-04 DIAGNOSIS — M54.6 CHRONIC MIDLINE THORACIC BACK PAIN: Primary | ICD-10-CM

## 2022-02-04 DIAGNOSIS — G89.29 CHRONIC MIDLINE THORACIC BACK PAIN: Primary | ICD-10-CM

## 2022-02-07 ENCOUNTER — OFFICE VISIT (OUTPATIENT)
Dept: FAMILY MEDICINE CLINIC | Facility: CLINIC | Age: 37
End: 2022-02-07

## 2022-02-07 ENCOUNTER — TELEPHONE (OUTPATIENT)
Dept: PHYSICAL THERAPY | Facility: OTHER | Age: 37
End: 2022-02-07

## 2022-02-07 ENCOUNTER — TREATMENT (OUTPATIENT)
Dept: PHYSICAL THERAPY | Facility: CLINIC | Age: 37
End: 2022-02-07

## 2022-02-07 VITALS
DIASTOLIC BLOOD PRESSURE: 70 MMHG | HEART RATE: 85 BPM | TEMPERATURE: 99.5 F | HEIGHT: 68 IN | WEIGHT: 187 LBS | BODY MASS INDEX: 28.34 KG/M2 | RESPIRATION RATE: 20 BRPM | OXYGEN SATURATION: 99 % | SYSTOLIC BLOOD PRESSURE: 110 MMHG

## 2022-02-07 DIAGNOSIS — M54.6 CHRONIC BILATERAL THORACIC BACK PAIN: ICD-10-CM

## 2022-02-07 DIAGNOSIS — R05.9 COUGH: Primary | ICD-10-CM

## 2022-02-07 DIAGNOSIS — M54.9 UPPER BACK PAIN: Primary | ICD-10-CM

## 2022-02-07 DIAGNOSIS — G89.29 CHRONIC BILATERAL THORACIC BACK PAIN: ICD-10-CM

## 2022-02-07 PROCEDURE — 97161 PT EVAL LOW COMPLEX 20 MIN: CPT | Performed by: PHYSICAL THERAPIST

## 2022-02-07 PROCEDURE — 99213 OFFICE O/P EST LOW 20 MIN: CPT | Performed by: NURSE PRACTITIONER

## 2022-02-07 RX ORDER — BENZONATATE 100 MG/1
100 CAPSULE ORAL 3 TIMES DAILY PRN
Qty: 30 CAPSULE | Refills: 0 | Status: SHIPPED | OUTPATIENT
Start: 2022-02-07 | End: 2022-03-29

## 2022-02-07 NOTE — PROGRESS NOTES
"Chief Complaint  Cough and Nasal Congestion    Subjective          Sunny Drummond presents to [facility]  Patient is a 35 yo male. He is here for cough, non productive. No chills.   Low grade fever.  nasal congestion. He denies any loss of taste or smell.   He has not been immunized for COVID-19.   He declines any COVID testing today.             The following portions of the patient's history were reviewed and updated as appropriate: allergies, current medications, past family history, past medical history, past social history, past surgical history and problem list.    Review of Systems   Constitutional: Positive for activity change, fatigue and fever. Negative for chills.   HENT: Positive for congestion.    Respiratory: Positive for cough and chest tightness.    Cardiovascular: Negative.    Gastrointestinal: Negative.    Allergic/Immunologic: Negative.    Neurological: Negative.    Hematological: Negative.    Psychiatric/Behavioral: Negative.          Objective   Vital Signs:   /70   Pulse 85   Temp 99.5 °F (37.5 °C)   Resp 20   Ht 172.7 cm (68\")   Wt 84.8 kg (187 lb)   SpO2 99%   BMI 28.43 kg/m²     Physical Exam  Vitals reviewed.   HENT:      Head: Normocephalic.      Right Ear: Tympanic membrane normal.      Nose: Congestion present.      Mouth/Throat:      Pharynx: No oropharyngeal exudate.   Eyes:      Pupils: Pupils are equal, round, and reactive to light.   Cardiovascular:      Rate and Rhythm: Normal rate and regular rhythm.      Pulses: Normal pulses.   Pulmonary:      Effort: Pulmonary effort is normal.      Breath sounds: Normal breath sounds.   Abdominal:      General: Bowel sounds are normal.      Palpations: Abdomen is soft.   Musculoskeletal:         General: Normal range of motion.   Skin:     General: Skin is dry.      Capillary Refill: Capillary refill takes less than 2 seconds.   Neurological:      Mental Status: He is alert and oriented to person, " place, and time.   Psychiatric:         Mood and Affect: Mood normal.        Result Review :                 Assessment and Plan    Diagnoses and all orders for this visit:    1. Cough (Primary)  -     benzonatate (Tessalon Perles) 100 MG capsule; Take 1 capsule by mouth 3 (Three) Times a Day As Needed for Cough.  Dispense: 30 capsule; Refill: 0    refused covid screening.   He declined inhaler.     Follow up as needed   Will need to quarantine per CDC guidelines.     He is missing work today.         Follow Up   Return if symptoms worsen or fail to improve.  Patient was given instructions and counseling regarding his condition or for health maintenance advice. Please see specific information pulled into the AVS if appropriate.
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
## 2022-02-16 ENCOUNTER — TREATMENT (OUTPATIENT)
Dept: PHYSICAL THERAPY | Facility: CLINIC | Age: 37
End: 2022-02-16

## 2022-02-16 DIAGNOSIS — M54.6 CHRONIC BILATERAL THORACIC BACK PAIN: ICD-10-CM

## 2022-02-16 DIAGNOSIS — M54.9 UPPER BACK PAIN: Primary | ICD-10-CM

## 2022-02-16 DIAGNOSIS — M54.2 PAIN, NECK: ICD-10-CM

## 2022-02-16 DIAGNOSIS — G89.29 CHRONIC BILATERAL THORACIC BACK PAIN: ICD-10-CM

## 2022-02-16 PROCEDURE — 97110 THERAPEUTIC EXERCISES: CPT | Performed by: PHYSICAL THERAPIST

## 2022-02-16 PROCEDURE — 97530 THERAPEUTIC ACTIVITIES: CPT | Performed by: PHYSICAL THERAPIST

## 2022-02-16 PROCEDURE — 97140 MANUAL THERAPY 1/> REGIONS: CPT | Performed by: PHYSICAL THERAPIST

## 2022-02-16 NOTE — PROGRESS NOTES
Physical Therapy Daily Treatment Note      Patient: Sunny Drummond   : 1985  Referring practitioner: YEE Turpin  Date of Initial Visit: Type: THERAPY  Noted: 2022  Today's Date: 2022  Patient seen for 2 sessions           Sunny Drummond reports 5/10 pain at rest.  Pt reports he felt a little better after last PT session.             Objective Pt arrives to PT today with no distress noted with with rest.     Pt with hypermobility in the T5-T6 area but hypomobility noted in the upper and lower T/S.      Pt with stability exercises to the mid T/S seemed to reduce pain and fatigue quickly.     See Exercise, Manual, and Modality Logs for complete treatment.       Assessment/Plan Pt is feeling slightly better.  He seems to have instability in one segment but hypomobility in another segment.     Visit Diagnoses:    ICD-10-CM ICD-9-CM   1. Upper back pain  M54.9 724.5   2. Chronic bilateral thoracic back pain  M54.6 724.1    G89.29 338.29   3. Pain, neck  M54.2 723.1       Progress per Plan of Care and Progress strengthening /stabilization /functional activity check response to the stability exercises.            Timed:  Manual Therapy:    11     mins  47179;  Therapeutic Exercise:    35     mins  18935;     Neuromuscular Jimmy:        mins  65095;    Therapeutic Activity:     8     mins  51505;     Gait Training:           mins  99140;     Ultrasound:          mins  23052;    Electrical Stimulation:         mins  73516 ( );    Untimed:  Electrical Stimulation:         mins  54444 ( );  Mechanical Traction:         mins  20024;     Timed Treatment:   54   mins   Total Treatment:     54   mins  Kyle Lorenz, PT  Physical Therapist

## 2022-03-08 ENCOUNTER — TELEPHONE (OUTPATIENT)
Dept: INTERNAL MEDICINE | Facility: CLINIC | Age: 37
End: 2022-03-08

## 2022-03-08 NOTE — TELEPHONE ENCOUNTER
Caller: Sunny Drummond    Relationship: Self    Best call back number: [phone]    What orders are you requesting (i.e. lab or imaging): LABS    In what timeframe would the patient need to come in: END OF MARCH    Where will you receive your lab/imaging services: IN OFFICE    Additional notes: MANUEL BUNCH TOLD HIM TO COME IN FOR MORE LABS. PLEASE CALL WHEN THE ORDER IS DROPPED. HE WANTS TO MAKE SURE HIS ESTROGEN LEVELS ARE INCLUDED IN THE ORDER

## 2022-03-09 ENCOUNTER — TREATMENT (OUTPATIENT)
Dept: PHYSICAL THERAPY | Facility: CLINIC | Age: 37
End: 2022-03-09

## 2022-03-09 DIAGNOSIS — M54.9 UPPER BACK PAIN: Primary | ICD-10-CM

## 2022-03-09 DIAGNOSIS — M54.6 CHRONIC BILATERAL THORACIC BACK PAIN: ICD-10-CM

## 2022-03-09 DIAGNOSIS — G89.29 CHRONIC BILATERAL THORACIC BACK PAIN: ICD-10-CM

## 2022-03-09 PROCEDURE — 97530 THERAPEUTIC ACTIVITIES: CPT | Performed by: PHYSICAL THERAPIST

## 2022-03-09 PROCEDURE — 97140 MANUAL THERAPY 1/> REGIONS: CPT | Performed by: PHYSICAL THERAPIST

## 2022-03-09 PROCEDURE — 97110 THERAPEUTIC EXERCISES: CPT | Performed by: PHYSICAL THERAPIST

## 2022-03-09 NOTE — PROGRESS NOTES
Physical Therapy Daily Treatment Note      Patient: Sunny Drummond   : 1985  Referring practitioner: YEE Turpin  Date of Initial Visit: Type: THERAPY  Noted: 2022  Today's Date: 3/9/2022  Patient seen for 3 sessions           Sunny Drummond reports 6/10 pain at rest.  Pt reports he is feeling a little better but the sxs still persist.              Objective Pt arrives to PT today with no distress noted with with rest.     T/S T2-T6 primary area of pain R, L and centrally.      Pt with C/S distraction + for minimal relief.  C/S distraction / retraction / ext is + for slight relief of sx.     See Exercise, Manual, and Modality Logs for complete treatment.     Trial of C/S txn with jr [facility]      Assessment/Plan Pt is having same sxs still occurring in the T/S. The exercises are minimally helpful.     Visit Diagnoses:    ICD-10-CM ICD-9-CM   1. Upper back pain  M54.9 724.5   2. Chronic bilateral thoracic back pain  M54.6 724.1    G89.29 338.29       Progress per Plan of Care and Progress strengthening /stabilization /functional activity            Timed:  Manual Therapy:    12     mins  71011;  Therapeutic Exercise:    16     mins  06974;     Neuromuscular Jimmy:        mins  33393;    Therapeutic Activity:     12     mins  43920;     Gait Training:           mins  32348;     Ultrasound:          mins  87308;    Electrical Stimulation:         mins  33412 ( );    Untimed:  Electrical Stimulation:         mins  54785 ( );  Mechanical Traction:         mins  72602;     Timed Treatment:   40   mins   Total Treatment:     40   mins  Kyle Lorenz, PT  Physical Therapist

## 2022-03-16 NOTE — TELEPHONE ENCOUNTER
PT CALLED BACK REGARDING GETTING LABS ORDERED. PT IS WONDERING WHEN HE CAN COME IN TO GET THOSE COMPLETED. PLEASE ADVISE.

## 2022-03-22 DIAGNOSIS — E29.0 MALE HYPERTESTOSTERONEMIA: Primary | ICD-10-CM

## 2022-03-22 NOTE — TELEPHONE ENCOUNTER
Patient called with an update on labs. The only reason he is calling is because he is heading out of town soon and would like to get them drawn before he leaves.     Once orders have been placed he would like a phone call so he can come in as soon as possible and get them drawn.    Please advise

## 2022-03-28 ENCOUNTER — LAB (OUTPATIENT)
Dept: LAB | Facility: HOSPITAL | Age: 37
End: 2022-03-28

## 2022-03-28 DIAGNOSIS — E29.0 MALE HYPERTESTOSTERONEMIA: ICD-10-CM

## 2022-03-28 PROCEDURE — 82670 ASSAY OF TOTAL ESTRADIOL: CPT

## 2022-03-28 PROCEDURE — 84402 ASSAY OF FREE TESTOSTERONE: CPT

## 2022-03-28 PROCEDURE — 84403 ASSAY OF TOTAL TESTOSTERONE: CPT

## 2022-03-28 PROCEDURE — 84146 ASSAY OF PROLACTIN: CPT

## 2022-03-28 PROCEDURE — 82533 TOTAL CORTISOL: CPT

## 2022-03-29 DIAGNOSIS — R05.9 COUGH: ICD-10-CM

## 2022-03-29 LAB
CORTIS SERPL-MCNC: 15.8 MCG/DL
ESTRADIOL SERPL HS-MCNC: 6.3 PG/ML
PROLACTIN SERPL-MCNC: 0.71 NG/ML (ref 4.04–15.2)

## 2022-03-29 RX ORDER — BENZONATATE 100 MG/1
CAPSULE ORAL
Qty: 30 CAPSULE | Refills: 0 | Status: SHIPPED | OUTPATIENT
Start: 2022-03-29 | End: 2022-04-08

## 2022-03-29 RX ORDER — TADALAFIL 20 MG/1
TABLET ORAL
Qty: 30 TABLET | OUTPATIENT
Start: 2022-03-29

## 2022-03-29 NOTE — TELEPHONE ENCOUNTER
Last Office Visit: 02/07/22  Next Office Visit: N/A  Last Refill Date: 02/07/22  Quantity: 30  Refills: 0

## 2022-03-29 NOTE — TELEPHONE ENCOUNTER
Called pt and made an appt to see Gary to est care on Thursday 3/31 at 3:15.  Pt states he is on both Cialis and Viagra and should be ok on refills till after his appt with Gary.

## 2022-03-30 ENCOUNTER — TREATMENT (OUTPATIENT)
Dept: PHYSICAL THERAPY | Facility: CLINIC | Age: 37
End: 2022-03-30

## 2022-03-30 DIAGNOSIS — M54.9 UPPER BACK PAIN: Primary | ICD-10-CM

## 2022-03-30 DIAGNOSIS — G89.29 CHRONIC BILATERAL THORACIC BACK PAIN: ICD-10-CM

## 2022-03-30 DIAGNOSIS — M54.6 CHRONIC BILATERAL THORACIC BACK PAIN: ICD-10-CM

## 2022-03-30 DIAGNOSIS — M54.2 PAIN, NECK: ICD-10-CM

## 2022-03-30 PROCEDURE — 97110 THERAPEUTIC EXERCISES: CPT | Performed by: PHYSICAL THERAPIST

## 2022-03-30 PROCEDURE — 97140 MANUAL THERAPY 1/> REGIONS: CPT | Performed by: PHYSICAL THERAPIST

## 2022-03-30 PROCEDURE — 97530 THERAPEUTIC ACTIVITIES: CPT | Performed by: PHYSICAL THERAPIST

## 2022-03-30 NOTE — PROGRESS NOTES
Physical Therapy Daily Treatment Note      Patient: Sunny Drummond   : 1985  Referring practitioner: YEE Turpin  Date of Initial Visit: Type: THERAPY  Noted: 2022  Today's Date: 3/30/2022  Patient seen for 4 sessions           Sunny Drummond reports 5/10 pain at rest.  Pt reports he is still hurting.  He reports that the traction seems to have helped but he has not gotten on yet.  His relief was partial and limited.          Objective Pt arrives to PT today with no distress noted with with rest.     Palpation:  T3-T6 seems to be the primary area of pain and discomfort.      Segmental flexion of the T3-T5 segments don't seem to be moving as much in flexion as they should.      See Exercise, Manual, and Modality Logs for complete treatment.    trial of DN today at the T/S and interspinous ligaments.     Assessment/Plan Pt is feeling about the same.  The txn helped temporarily.       Visit Diagnoses:    ICD-10-CM ICD-9-CM   1. Upper back pain  M54.9 724.5   2. Chronic bilateral thoracic back pain  M54.6 724.1    G89.29 338.29   3. Pain, neck  M54.2 723.1       Progress per Plan of Care and Progress strengthening /stabilization /functional activity check response to DN today.             Timed:  Manual Therapy:    12     mins  99438;  Therapeutic Exercise:    16     mins  10008;     Neuromuscular Jimmy:        mins  63823;    Therapeutic Activity:     11     mins  11324;     Gait Training:           mins  72288;     Ultrasound:          mins  76889;    Electrical Stimulation:         mins  35706 ( );    Untimed:  Electrical Stimulation:         mins  26888 ( );  Mechanical Traction:         mins  22811;     Timed Treatment:   39   mins   Total Treatment:     45   mins  Kyle Lorenz, PT  Physical Therapist

## 2022-04-01 LAB
TESTOST FREE SERPL-MCNC: 3.1 PG/ML (ref 8.7–25.1)
TESTOST SERPL-MCNC: 440 NG/DL (ref 264–916)

## 2022-04-01 NOTE — PROGRESS NOTES
Gavino Correa. Your testosterone levels have come down. Your total testosterone is 440, and free testosterone is 3.1, which is low.  I see you have an appt with endocrine in April, which is what I would recommend for you.  He will address if you will need any testosterone replacement.  Again, I would recommend you do any testosterone replacement thru medical advisement, and do not supplement with other forms of testosterone.

## 2022-04-05 ENCOUNTER — TREATMENT (OUTPATIENT)
Dept: PHYSICAL THERAPY | Facility: CLINIC | Age: 37
End: 2022-04-05

## 2022-04-05 DIAGNOSIS — G89.29 CHRONIC BILATERAL THORACIC BACK PAIN: ICD-10-CM

## 2022-04-05 DIAGNOSIS — M54.6 CHRONIC BILATERAL THORACIC BACK PAIN: ICD-10-CM

## 2022-04-05 DIAGNOSIS — M54.9 UPPER BACK PAIN: Primary | ICD-10-CM

## 2022-04-05 PROCEDURE — 97530 THERAPEUTIC ACTIVITIES: CPT | Performed by: PHYSICAL THERAPIST

## 2022-04-05 PROCEDURE — 97110 THERAPEUTIC EXERCISES: CPT | Performed by: PHYSICAL THERAPIST

## 2022-04-05 PROCEDURE — 97140 MANUAL THERAPY 1/> REGIONS: CPT | Performed by: PHYSICAL THERAPIST

## 2022-04-05 NOTE — PROGRESS NOTES
Physical Therapy Daily Treatment Note      Patient: Sunny Drummond   : 1985  Referring practitioner: YEE Turpin  Date of Initial Visit: Type: THERAPY  Noted: 2022  Today's Date: 2022  Patient seen for 5 sessions           Sunny Drummond reports 4/10 pain at rest.  Pt reports he did really well with the DN last time.  He had the best results last time.               Objective Pt arrives to PT today with no distress noted with with rest.     Pt with less discomfort noted with palpation exam.     Pt needed cues for T/S mobility and head position.     See Exercise, Manual, and Modality Logs for complete treatment.       Assessment/Plan Pt is improved from the HEP and the DN.  He reports significant improvement from doing the DN in the Interspinous ligament.     Visit Diagnoses:    ICD-10-CM ICD-9-CM   1. Upper back pain  M54.9 724.5   2. Chronic bilateral thoracic back pain  M54.6 724.1    G89.29 338.29       Pt has run out of insurance approval.  We will D/C at this time.  I think he benefited from the DN but he also needs to change his training methods to avoid overuse of the T/S area.            Timed:  Manual Therapy:    12     mins  45373;  Therapeutic Exercise:    16     mins  18384;     Neuromuscular Jimmy:        mins  05843;    Therapeutic Activity:     11     mins  77039;     Gait Training:           mins  70174;     Ultrasound:          mins  49170;    Electrical Stimulation:         mins  93474 ( );    Untimed:  Electrical Stimulation:         mins  82788 ( );  Mechanical Traction:         mins  96579;     Timed Treatment:   39   mins   Total Treatment:     39   mins  Kyle Lorenz, PT  Physical Therapist

## 2022-04-08 ENCOUNTER — OFFICE VISIT (OUTPATIENT)
Dept: INTERNAL MEDICINE | Facility: CLINIC | Age: 37
End: 2022-04-08

## 2022-04-08 VITALS
HEART RATE: 87 BPM | WEIGHT: 186 LBS | SYSTOLIC BLOOD PRESSURE: 150 MMHG | RESPIRATION RATE: 14 BRPM | BODY MASS INDEX: 28.19 KG/M2 | HEIGHT: 68 IN | TEMPERATURE: 97.8 F | OXYGEN SATURATION: 99 % | DIASTOLIC BLOOD PRESSURE: 78 MMHG

## 2022-04-08 DIAGNOSIS — N52.8 OTHER MALE ERECTILE DYSFUNCTION: Primary | ICD-10-CM

## 2022-04-08 DIAGNOSIS — R03.0 ELEVATED BLOOD PRESSURE READING: ICD-10-CM

## 2022-04-08 PROCEDURE — 99214 OFFICE O/P EST MOD 30 MIN: CPT | Performed by: PHYSICIAN ASSISTANT

## 2022-04-08 RX ORDER — TRETINOIN 0.1 %
CREAM (GRAM) TOPICAL
COMMUNITY
Start: 2022-03-08 | End: 2022-05-10

## 2022-04-08 RX ORDER — TRAZODONE HYDROCHLORIDE 100 MG/1
100 TABLET ORAL NIGHTLY
COMMUNITY
Start: 2022-03-29 | End: 2022-05-10

## 2022-04-08 RX ORDER — ASPIRIN 81 MG/1
TABLET, COATED ORAL
COMMUNITY
Start: 2022-03-29 | End: 2023-03-22

## 2022-04-08 RX ORDER — SILDENAFIL 100 MG/1
100 TABLET, FILM COATED ORAL AS NEEDED
Qty: 16 TABLET | Refills: 2 | Status: SHIPPED | OUTPATIENT
Start: 2022-04-08 | End: 2022-04-15

## 2022-04-08 RX ORDER — TADALAFIL 5 MG/1
5 TABLET ORAL DAILY
Qty: 90 TABLET | Refills: 1 | Status: SHIPPED | OUTPATIENT
Start: 2022-04-08 | End: 2023-03-22 | Stop reason: DRUGHIGH

## 2022-04-08 NOTE — PROGRESS NOTES
Chief Complaint   Patient presents with   • Med Refill       Subjective     Sunny Drummond is a 37 y.o. male.        History of Present Illness     Pt was previously patient of [facility], here to establish care. A couple years ago he developed some hair thinning and was started on finasteride. Developed erectile dysfunction related to the medication. Tried both Cialis and Viagra. Tried stopping finasteride but his hair loss worsens. Currently he is taking 1/2 if Cialis 20 mg every other day and then uses Viagra prn. Has felt like he developed resistance to the Ciagra if he took daily.    He tried some supplemental testosterone and had elevated levels. He saw Endocrinology and is followed yearly. Testosterone levels have returned to normal.    Does not check his blood pressure routinely. Had it checked a few weeks ago. He lost a close friend recently d/t drug overdose.      Current Outpatient Medications:   •  Aspirin Low Dose 81 MG EC tablet, , Disp: , Rfl:   •  cabergoline (DOSTINEX) 0.5 MG tablet, Take 1 tablet by mouth 2 (Two) Times a Week for 336 days., Disp: 8 tablet, Rfl: 11  •  finasteride (PROPECIA) 1 MG tablet, Take 1 tablet by mouth Daily. (Patient taking differently: Take 5 mg by mouth Daily.), Disp: 30 tablet, Rfl: 5  •  Multiple Vitamin (multivitamin) capsule, Take 1 capsule by mouth Daily., Disp: , Rfl:   •  Retin-A 0.1 % cream, , Disp: , Rfl:   •  sildenafil (VIAGRA) 100 MG tablet, Take 1 tablet by mouth As Needed for Erectile Dysfunction., Disp: 16 tablet, Rfl: 2  •  tadalafil (CIALIS) 5 MG tablet, Take 1 tablet by mouth Daily., Disp: 90 tablet, Rfl: 1  •  traZODone (DESYREL) 100 MG tablet, , Disp: , Rfl:      PMFSH  The following portions of the patient's history were reviewed and updated as appropriate: allergies, current medications, past family history, past medical history, past social history, past surgical history and problem list.    Review of Systems   Constitutional: Negative for activity  "change, appetite change and fatigue.   HENT: Negative for congestion and rhinorrhea.    Respiratory: Negative for chest tightness and shortness of breath.    Cardiovascular: Negative for chest pain and palpitations.   Gastrointestinal: Negative for abdominal pain.   Genitourinary: Negative for dysuria.   Musculoskeletal: Negative for arthralgias and myalgias.   Neurological: Negative for dizziness, weakness, light-headedness and headaches.   Psychiatric/Behavioral: Negative for dysphoric mood. The patient is not nervous/anxious.        Objective   /78   Pulse 87   Temp 97.8 °F (36.6 °C)   Resp 14   Ht 172.7 cm (68\")   Wt 84.4 kg (186 lb)   SpO2 99%   BMI 28.28 kg/m²     Physical Exam  Constitutional:       Appearance: He is well-developed.   HENT:      Head: Normocephalic and atraumatic.      Right Ear: External ear normal.      Left Ear: External ear normal.   Eyes:      Conjunctiva/sclera: Conjunctivae normal.   Cardiovascular:      Rate and Rhythm: Normal rate and regular rhythm.   Pulmonary:      Effort: Pulmonary effort is normal.      Breath sounds: Normal breath sounds.   Musculoskeletal:         General: Normal range of motion.      Cervical back: Normal range of motion.   Skin:     General: Skin is warm and dry.   Psychiatric:         Behavior: Behavior normal.              ASSESSMENT/PLAN    Diagnoses and all orders for this visit:    1. Elevated blood pressure reading (Primary)  Comments:  Likely secondary to situational stressors. He will monitor at home and call if elevated.    2. Other male erectile dysfunction  Assessment & Plan:  Discussed treatment options and decided to try Cialis 5 mg daily with viagra prn. If he prefers, he can take Cialis 10 mg every other day and viagra prn. Keep f/u with Endocrinology.    Orders:  -     sildenafil (VIAGRA) 100 MG tablet; Take 1 tablet by mouth As Needed for Erectile Dysfunction.  Dispense: 16 tablet; Refill: 2  -     tadalafil (CIALIS) 5 MG " tablet; Take 1 tablet by mouth Daily.  Dispense: 90 tablet; Refill: 1           Return in about 3 months (around 7/8/2022) for Annual with fasting labs.

## 2022-04-12 NOTE — ASSESSMENT & PLAN NOTE
Discussed treatment options and decided to try Cialis 5 mg daily with viagra prn. If he prefers, he can take Cialis 10 mg every other day and viagra prn. Keep f/u with Endocrinology.

## 2022-04-15 ENCOUNTER — TELEPHONE (OUTPATIENT)
Dept: INTERNAL MEDICINE | Facility: CLINIC | Age: 37
End: 2022-04-15

## 2022-04-15 DIAGNOSIS — N52.8 OTHER MALE ERECTILE DYSFUNCTION: ICD-10-CM

## 2022-04-15 RX ORDER — SILDENAFIL 100 MG/1
TABLET, FILM COATED ORAL
Qty: 16 TABLET | Refills: 2 | Status: SHIPPED | OUTPATIENT
Start: 2022-04-15

## 2022-04-15 RX ORDER — TADALAFIL 20 MG/1
TABLET ORAL
Qty: 30 TABLET | Refills: 3 | Status: SHIPPED | OUTPATIENT
Start: 2022-04-15 | End: 2023-03-22 | Stop reason: SDUPTHER

## 2022-04-15 NOTE — TELEPHONE ENCOUNTER
Received a noticed that Tadalafil (Cialis) is not covered by his insurance. Has his insurance changed or is this something he has dealt with before? Diannat try to figure out how it was covered previously.

## 2022-05-03 ENCOUNTER — TELEPHONE (OUTPATIENT)
Dept: INTERNAL MEDICINE | Facility: CLINIC | Age: 37
End: 2022-05-03

## 2022-05-03 NOTE — TELEPHONE ENCOUNTER
Patient has called in asking for a referral to a spine specialist.     The last time you saw this patient was on 4/8/22. Would you like for the patient to come in and be evaluated for this issue or will you input a referral to a specialist.

## 2022-05-03 NOTE — TELEPHONE ENCOUNTER
Caller: Sunny Drummond    Relationship: Self    Best call back number: [phone]    What is the medical concern/diagnosis: back issues     What specialty or service is being requested: spine specialist    What is the provider, practice or medical service name:     What is the office location:     What is the office phone number:     Any additional details:

## 2022-05-04 NOTE — TELEPHONE ENCOUNTER
Left vm for patient to return call to office. Office number given.      HUB: If patient returns call to office inform patient that pedro pablo would like to see him in clinic to discuss referral to spin specialist.

## 2022-05-04 NOTE — TELEPHONE ENCOUNTER
This is not something we discussed at his appointment so I would need to see him first to document reason for the referral.

## 2022-05-06 ENCOUNTER — OFFICE VISIT (OUTPATIENT)
Dept: INTERNAL MEDICINE | Facility: CLINIC | Age: 37
End: 2022-05-06

## 2022-05-06 VITALS
DIASTOLIC BLOOD PRESSURE: 72 MMHG | WEIGHT: 188 LBS | HEART RATE: 92 BPM | HEIGHT: 68 IN | BODY MASS INDEX: 28.49 KG/M2 | OXYGEN SATURATION: 98 % | SYSTOLIC BLOOD PRESSURE: 116 MMHG

## 2022-05-06 DIAGNOSIS — G89.29 CHRONIC RIGHT-SIDED THORACIC BACK PAIN: Primary | ICD-10-CM

## 2022-05-06 DIAGNOSIS — M54.6 CHRONIC RIGHT-SIDED THORACIC BACK PAIN: Primary | ICD-10-CM

## 2022-05-06 DIAGNOSIS — G47.00 INSOMNIA, UNSPECIFIED TYPE: ICD-10-CM

## 2022-05-06 PROCEDURE — 99214 OFFICE O/P EST MOD 30 MIN: CPT | Performed by: PHYSICIAN ASSISTANT

## 2022-05-06 RX ORDER — HYDROXYZINE HYDROCHLORIDE 25 MG/1
TABLET, FILM COATED ORAL
Qty: 60 TABLET | Refills: 1 | Status: SHIPPED | OUTPATIENT
Start: 2022-05-06 | End: 2022-05-11 | Stop reason: SINTOL
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
## 2022-05-06 NOTE — ASSESSMENT & PLAN NOTE
Tried and failed PT and chiropractor care. Refer for updated thoracic MRI and refer to pain management to discuss treatment options.

## 2022-05-10 ENCOUNTER — OFFICE VISIT (OUTPATIENT)
Dept: ENDOCRINOLOGY | Facility: CLINIC | Age: 37
End: 2022-05-10

## 2022-05-10 VITALS
WEIGHT: 190 LBS | HEIGHT: 68 IN | OXYGEN SATURATION: 97 % | HEART RATE: 90 BPM | SYSTOLIC BLOOD PRESSURE: 118 MMHG | BODY MASS INDEX: 28.79 KG/M2 | DIASTOLIC BLOOD PRESSURE: 70 MMHG

## 2022-05-10 DIAGNOSIS — F52.4 PREMATURE EJACULATION: ICD-10-CM

## 2022-05-10 DIAGNOSIS — E22.1 HYPERPROLACTINEMIA: Primary | ICD-10-CM

## 2022-05-10 PROCEDURE — 99213 OFFICE O/P EST LOW 20 MIN: CPT | Performed by: PHYSICIAN ASSISTANT

## 2022-05-10 RX ORDER — CABERGOLINE 0.5 MG/1
0.5 TABLET ORAL 2 TIMES WEEKLY
Qty: 8 TABLET | Refills: 11 | Status: SHIPPED | OUTPATIENT
Start: 2022-05-12 | End: 2022-06-29

## 2022-05-10 NOTE — PROGRESS NOTES
Office Note      Date: 05/10/2022  Patient Name: Sunny Drummond  MRN: [id number]  : 1985    Chief Complaint   Patient presents with   • Hyperprolactinemia       History of Present Illness:   Sunny Drummond is a 37 y.o. male who presents today for follow up on history of hyperprolactinemia.  He reports main concern today is premature ejaculation.  He reports history of this for ~20 years.  Prolactin was noted to be mildly elevated in the 30-40 range.  He had normal MRI of pituitary.  Mild hyperprolactinemia was felt to be medication induced.  He was started on cabergoline twice per week in 2020.  Prolactin did decrease as expected on the cabergoline.  He remains on the cabergoline.  He reports tolerating well.  He reports taking finasteride for hair loss.  He reports that he has stopped taking testosterone (testosterone was not by prescription).  Total testosterone was >1500 on 2021.  This was down to 440 on 3/28/2022.  Free testosterone was 3.1 (LLN 8.7).  Normal estradiol at 6.3.  Prolactin was 0.71.  He reports that he has felt more lethargic.  He reports a long history of insomnia.  He saw PCP recently.  He reports that he will be changing trazodone to hydroxyzine.  He reports that he is scheduled for sleep study.      Subjective      Review of Systems:  Review of Systems   Constitutional: Positive for fatigue.   Cardiovascular: Negative.    Gastrointestinal: Negative.    Endocrine: Negative.    Psychiatric/Behavioral: Positive for sleep disturbance.        Past Medical History:   Diagnosis Date   • Chronic back pain    • Chronic fatigue 3/2/2021   • Hyperprolactinemia (HCC) 2020   • Insomnia    • Male hypertestosteronemia       Past Surgical History:   Procedure Laterality Date   • FINGER SURGERY Right      The following portions of the patient's history were reviewed and updated as appropriate: allergies, current medications, past family history, past medical history, past social  "history, past surgical history and problem list.    Objective     Vitals:    05/10/22 1100   BP: 118/70   Pulse: 90   SpO2: 97%   Weight: 86.2 kg (190 lb)   Height: 172.7 cm (68\")   PainSc: 0-No pain     Body mass index is 28.89 kg/m².    Physical Exam  Vitals reviewed.   Constitutional:       General: He is not in acute distress.  Neurological:      Mental Status: He is alert and oriented to person, place, and time.   Psychiatric:         Mood and Affect: Affect normal.         Current Outpatient Medications   Medication Instructions   • Aspirin Low Dose 81 MG EC tablet No dose, route, or frequency recorded.   • cabergoline (DOSTINEX) 0.5 mg, Oral, 2 Times Weekly   • finasteride (PROPECIA) 1 mg, Oral, Daily   • hydrOXYzine (ATARAX) 25 MG tablet Take 1-2 tablets at bedtime   • sildenafil (VIAGRA) 100 MG tablet TAKE ONE TABLET BY MOUTH DAILY AS NEEDED FOR ERECTILE DYSFUNCTION - DO NOT EXCEED MORE THAN 4 TIMES A WEEK   • tadalafil (CIALIS) 20 MG tablet TAKE ONE TABLET BY MOUTH DAILY AS NEEDED FOR ERECTILE DYSFUNCTION   • tadalafil (CIALIS) 5 mg, Oral, Daily       Assessment / Plan      Assessment & Plan:  1. Hyperprolactinemia (HCC)  Continue cabergoline.  Consider decreasing dose/stopping cabergoline next visit.  Recheck free testosterone (by equilibrium dialysis) and total testosterone in ~1 month.  - cabergoline (DOSTINEX) 0.5 MG tablet; Take 1 tablet by mouth 2 (Two) Times a Week for 336 days.  Dispense: 8 tablet; Refill: 11  - Testosterone Free MS / Dialysis; Future    2. Premature ejaculation  Recommend that he follow up with PCP.  He was prescribed duloxetine in the past.  This may help with premature ejaculation.  He declined referral to urology.      Return in about 1 year (around 5/10/2023) for next scheduled follow up, sooner if needed. He was advised to contact the office with any interval questions or concerns.    ANDRE Narayanan  Endocrinology  05/10/2022  "

## 2022-05-11 ENCOUNTER — OFFICE VISIT (OUTPATIENT)
Dept: INTERNAL MEDICINE | Facility: CLINIC | Age: 37
End: 2022-05-11

## 2022-05-11 VITALS
OXYGEN SATURATION: 96 % | HEART RATE: 86 BPM | RESPIRATION RATE: 16 BRPM | WEIGHT: 188.8 LBS | SYSTOLIC BLOOD PRESSURE: 140 MMHG | TEMPERATURE: 98.1 F | BODY MASS INDEX: 28.71 KG/M2 | DIASTOLIC BLOOD PRESSURE: 84 MMHG

## 2022-05-11 DIAGNOSIS — F51.01 PRIMARY INSOMNIA: Primary | ICD-10-CM

## 2022-05-11 PROCEDURE — 99214 OFFICE O/P EST MOD 30 MIN: CPT

## 2022-05-11 RX ORDER — TRAZODONE HYDROCHLORIDE 100 MG/1
100 TABLET ORAL NIGHTLY
Qty: 90 TABLET | Refills: 1 | Status: SHIPPED | OUTPATIENT
Start: 2022-05-11 | End: 2022-06-28

## 2022-05-11 NOTE — PROGRESS NOTES
Chief Complaint  Medication Problem (Saw Kassie aSbillon on Friday and she put him on sleeping meds. /Sleeping meds aren't letting him sleep. Having tremors and muscle spasms )    Sunny Drummond presents to [facility]    HPI: Reports being on trazodone in the past but felt as if it was making him anxious. Presented to PCP with these concerns and was switched to hydroxyzine and referred to sleep medicine. States since he has been off of trazodone he has not slept in 3 days. Experiencing frequent muscle spasms and twitching that only occur soon after taking the medication. No reports of seizure like activity. Has tried amitriptyline and melatonin in the past which were non-beneficial. Requesting to go back on trazodone. Was started on cymbalta by endocrinology which he took in the past for anxiety. No other complaints today.     Subjective         Health Maintenance   Topic   • COVID-19 Vaccine (1)   • TDAP/TD VACCINES (1 - Tdap)   • ANNUAL PHYSICAL    • INFLUENZA VACCINE    • HEPATITIS C SCREENING    • Pneumococcal Vaccine 0-64        PMSFH  The following portions of the patient's history were reviewed and updated as appropriate: allergies, current medications, past family history, past medical history, past social history, past surgical history and problem list.     Past Medical History:   Diagnosis Date   • Chronic back pain    • Insomnia    • Male hypertestosteronemia       No Known Allergies   Social History     Tobacco Use   • Smoking status: Former Smoker     Packs/day: 0.25     Years: 10.00     Pack years: 2.50     Types: Cigarettes     Quit date: 3/2/2018     Years since quittin.2   • Smokeless tobacco: Former User     Types: Snuff   • Tobacco comment: just when drinking   Vaping Use   • Vaping Use: Former   • Substances: Nicotine   Substance Use Topics   • Alcohol use: Yes     Comment: occasionally   • Drug use: No     Past Surgical History:   Procedure Laterality Date    • FINGER SURGERY Right       Family History   Problem Relation Age of Onset   • Diabetes Mother    • Heart disease Maternal Grandfather    • Alcohol abuse Maternal Grandfather    • Cancer Father    • Alcohol abuse Father          Current Outpatient Medications:   •  Aspirin Low Dose 81 MG EC tablet, , Disp: , Rfl:   •  cabergoline (DOSTINEX) 0.5 MG tablet, Take 1 tablet by mouth 2 (Two) Times a Week for 336 days., Disp: 8 tablet, Rfl: 11  •  finasteride (PROPECIA) 1 MG tablet, Take 1 tablet by mouth Daily. (Patient taking differently: Take 5 mg by mouth Daily.), Disp: 30 tablet, Rfl: 5  •  sildenafil (VIAGRA) 100 MG tablet, TAKE ONE TABLET BY MOUTH DAILY AS NEEDED FOR ERECTILE DYSFUNCTION - DO NOT EXCEED MORE THAN 4 TIMES A WEEK, Disp: 16 tablet, Rfl: 2  •  tadalafil (CIALIS) 20 MG tablet, TAKE ONE TABLET BY MOUTH DAILY AS NEEDED FOR ERECTILE DYSFUNCTION, Disp: 30 tablet, Rfl: 3  •  tadalafil (CIALIS) 5 MG tablet, Take 1 tablet by mouth Daily., Disp: 90 tablet, Rfl: 1  •  traZODone (DESYREL) 100 MG tablet, Take 1 tablet by mouth Every Night., Disp: 90 tablet, Rfl: 1    Review of Systems   Constitutional: Negative for activity change, appetite change, chills, fatigue and fever.   Respiratory: Negative for apnea, cough, choking, chest tightness, shortness of breath, wheezing and stridor.    Cardiovascular: Negative for chest pain, palpitations and leg swelling.   Musculoskeletal: Negative for arthralgias, back pain, gait problem, joint swelling, myalgias, neck pain, neck stiffness and bursitis.   Neurological: Positive for tremors. Negative for dizziness, seizures, syncope, facial asymmetry, speech difficulty, weakness, light-headedness, numbness, headache, memory problem and confusion.   Psychiatric/Behavioral: Positive for sleep disturbance and positive for hyperactivity. Negative for agitation, behavioral problems, decreased concentration, dysphoric mood, hallucinations, self-injury, suicidal ideas, depressed  mood and stress. The patient is nervous/anxious.        Objective   Vital Signs  /84   Pulse 86   Temp 98.1 °F (36.7 °C)   Resp 16   Wt 85.6 kg (188 lb 12.8 oz)   SpO2 96%   BMI 28.71 kg/m²     Physical Exam  Constitutional:       Appearance: Normal appearance.   HENT:      Head: Normocephalic.      Mouth/Throat:      Mouth: Mucous membranes are moist.      Pharynx: Oropharynx is clear.   Cardiovascular:      Rate and Rhythm: Normal rate and regular rhythm.      Pulses: Normal pulses.      Heart sounds: Normal heart sounds.   Pulmonary:      Effort: Pulmonary effort is normal.      Breath sounds: Normal breath sounds.   Abdominal:      General: Bowel sounds are normal.      Palpations: Abdomen is soft.   Skin:     General: Skin is warm and dry.      Capillary Refill: Capillary refill takes less than 2 seconds.   Neurological:      Mental Status: He is alert and oriented to person, place, and time.      Cranial Nerves: Cranial nerves are intact.      Sensory: Sensation is intact.      Motor: Motor function is intact.      Coordination: Coordination is intact.      Gait: Gait is intact.      Deep Tendon Reflexes: Reflexes are normal and symmetric.   Psychiatric:         Mood and Affect: Mood normal.         Behavior: Behavior normal.         Thought Content: Thought content normal.         Judgment: Judgment normal.          Result Review :     The following data was reviewed by: YEE Almonte on 05/11/2022:  CMP    CMP 6/2/21 12/30/21   Glucose 80 99   BUN 15 10   Creatinine 0.93 1.07   eGFR Non African Am 92 78   Sodium 135 (A) 138   Potassium 4.8 4.7   Chloride 103 102   Calcium 9.3 9.1   Albumin 4.20 4.00   Total Bilirubin 0.4 0.3   Alkaline Phosphatase 32 (A) 48   AST (SGOT) 49 (A) 46 (A)   ALT (SGPT) 46 (A) 51 (A)   (A) Abnormal value              Assessment and Plan    1. Primary insomnia  - traZODone (DESYREL) 100 MG tablet; Take 1 tablet by mouth Every Night.  Dispense: 90 tablet; Refill: 1  -  Muscle spasms and involuntary movements are listed SE of vistaril. Will d/c for this reason. Will reinitiate trazodone at patient's request. Hopefully Cymbalta can negate some of the anxiety that trazodone has caused in the past. Encouraged to follow-up with PCP if symptoms worsen or fail to improve. Patient verbalized understanding and was agreeable with POC.     Follow Up     Return for Next scheduled follow up.    Patient was given instructions and counseling regarding his condition or for health maintenance advice. Please see specific information pulled into the AVS if appropriate.    Part of this note may be an electronic transcription/translation of spoken language to printed text using the Dragon Dictation System.    Electronically signed by:   YEE Almonte  05/11/2022

## 2022-05-19 DIAGNOSIS — L64.9 MALE PATTERN BALDNESS: ICD-10-CM

## 2022-05-19 RX ORDER — FINASTERIDE 1 MG/1
TABLET, FILM COATED ORAL
Qty: 30 TABLET | Refills: 5 | Status: SHIPPED | OUTPATIENT
Start: 2022-05-19 | End: 2022-08-10 | Stop reason: SDUPTHER

## 2022-05-19 NOTE — TELEPHONE ENCOUNTER
Has an appointment to establish care on 07/13/22 from [facility]    Last refilled on 05/15/21 for 30 with 5 refills

## 2022-06-13 ENCOUNTER — HOSPITAL ENCOUNTER (OUTPATIENT)
Dept: MRI IMAGING | Facility: HOSPITAL | Age: 37
Discharge: HOME | End: 2022-06-13
Admitting: PHYSICIAN ASSISTANT

## 2022-06-13 DIAGNOSIS — G89.29 CHRONIC RIGHT-SIDED THORACIC BACK PAIN: ICD-10-CM

## 2022-06-13 DIAGNOSIS — M54.6 CHRONIC RIGHT-SIDED THORACIC BACK PAIN: ICD-10-CM

## 2022-06-13 PROCEDURE — 72146 MRI CHEST SPINE W/O DYE: CPT

## 2022-06-13 NOTE — PROGRESS NOTES
The MRI of your back shows an incidental finding of a hemangioma at T7. This is not likely contributing to your pain and not worrisome. Everything else looks fine.

## 2022-06-28 DIAGNOSIS — F51.01 PRIMARY INSOMNIA: ICD-10-CM

## 2022-06-28 DIAGNOSIS — E22.1 HYPERPROLACTINEMIA: ICD-10-CM

## 2022-06-28 RX ORDER — TRAZODONE HYDROCHLORIDE 100 MG/1
TABLET ORAL
Qty: 30 TABLET | Refills: 1 | Status: SHIPPED | OUTPATIENT
Start: 2022-06-28 | End: 2023-03-22 | Stop reason: SDUPTHER

## 2022-06-29 RX ORDER — CABERGOLINE 0.5 MG/1
TABLET ORAL
Qty: 8 TABLET | Refills: 11 | Status: SHIPPED | OUTPATIENT
Start: 2022-06-29 | End: 2023-03-22

## 2022-07-13 ENCOUNTER — TELEPHONE (OUTPATIENT)
Dept: URGENT CARE | Facility: CLINIC | Age: 37
End: 2022-07-13

## 2022-07-13 ENCOUNTER — TELEPHONE (OUTPATIENT)
Dept: INTERNAL MEDICINE | Facility: CLINIC | Age: 37
End: 2022-07-13

## 2022-07-13 NOTE — TELEPHONE ENCOUNTER
Caller: Sunny Drummond    Relationship: Self    Best call back number: [phone]    What medications are you currently taking:   Current Outpatient Medications on File Prior to Visit   Medication Sig Dispense Refill   • Aspirin Low Dose 81 MG EC tablet      • cabergoline (DOSTINEX) 0.5 MG tablet TAKE ONE TABLET BY MOUTH 2 TIMES A WEEK  DAYS 8 tablet 11   • doxycycline (MONODOX) 100 MG capsule Take 1 capsule by mouth 2 (Two) Times a Day. 14 capsule 0   • finasteride (PROPECIA) 1 MG tablet TAKE ONE TABLET BY MOUTH DAILY 30 tablet 5   • HYDROcodone-acetaminophen (NORCO) 5-325 MG per tablet      • ibuprofen (ADVIL,MOTRIN) 800 MG tablet      • sildenafil (VIAGRA) 100 MG tablet TAKE ONE TABLET BY MOUTH DAILY AS NEEDED FOR ERECTILE DYSFUNCTION - DO NOT EXCEED MORE THAN 4 TIMES A WEEK 16 tablet 2   • tadalafil (CIALIS) 20 MG tablet TAKE ONE TABLET BY MOUTH DAILY AS NEEDED FOR ERECTILE DYSFUNCTION 30 tablet 3   • tadalafil (CIALIS) 5 MG tablet Take 1 tablet by mouth Daily. 90 tablet 1   • traZODone (DESYREL) 100 MG tablet TAKE ONE TABLET BY MOUTH ONCE NIGHTLY 30 tablet 1     No current facility-administered medications on file prior to visit.        Which medication are you concerned about: DOXYCYCLINE    Who prescribed you this medication: DR WONG     What are your concerns: PATIENT WENT TO URGENT CARE TODAY AND THIS MEDICATION IS UNAVAILABLE THERE AND IN THE SURROUNDING AREA PHARMACIES SO PATIENT WOULD LIKE TO KNOW [facility] CAN PRESCRIBE SOMETHING ELSE. PATIENT CALLED URGENT CARE ON THIS MATTER AND THEY SAID DOCTOR IS VERY BUSY AND WOULD NOT BE ABLE TO PRESCRIBE SOMETHING ELSE TODAY OR TOMORROW. PLEASE ADVISE AND CALL PATIENT BACK

## 2022-07-13 NOTE — TELEPHONE ENCOUNTER
Sunny said his pharmacy does not have antibiotic you prescribed for him today and will not be in until tomorrow.  He wanted to know if you would send him in a different antibiotic

## 2022-07-14 NOTE — TELEPHONE ENCOUNTER
According to the note I received, the [facility] provider switched him to Keflex. Pelase check to make sure he was able to get this.

## 2022-07-14 NOTE — TELEPHONE ENCOUNTER
Left voice message that according the [facility] note he was switched to Keflex and we just wanted to make sure he was aware and picked this up

## 2022-07-18 RX ORDER — ASPIRIN 81 MG/1
TABLET, COATED ORAL
Qty: 30 TABLET | OUTPATIENT
Start: 2022-07-18

## 2022-08-10 DIAGNOSIS — L64.9 MALE PATTERN BALDNESS: ICD-10-CM

## 2022-08-10 RX ORDER — FINASTERIDE 1 MG/1
1 TABLET, FILM COATED ORAL DAILY
Qty: 30 TABLET | Refills: 1 | Status: SHIPPED | OUTPATIENT
Start: 2022-08-10

## 2022-08-10 NOTE — TELEPHONE ENCOUNTER
Caller: Sunny Drummond    Relationship: Self    Best call back number: [phone]    Requested Prescriptions:   Requested Prescriptions     Pending Prescriptions Disp Refills   • finasteride (PROPECIA) 1 MG tablet 30 tablet 5     Sig: Take 1 tablet by mouth Daily.        Pharmacy where request should be sent: [facility] [address] [place] [address]  AT [facility] & [address] - [phone]  - [phone] FX     Additional details provided by patient: THE PATIENT STATES THAT HE SWITCHED [facility] FROM [place] TO [facility] AND WAS TOLD HE WOULD HAVE TO REACH OUT TO HIS PROVIDER TO GET THIS MED REFILL APPROVED. THE REPORTS HE IS OUT OF MEDICATION. PLEASE CALL THE PATIENT AND ADVISE     Does the patient have less than a 3 day supply:  [x] Yes  [] No    Kait Kenney Rep   08/10/22 17:14 EDT

## 2022-08-31 ENCOUNTER — LAB (OUTPATIENT)
Dept: LAB | Facility: HOSPITAL | Age: 37
End: 2022-08-31

## 2022-08-31 ENCOUNTER — TELEPHONE (OUTPATIENT)
Dept: INTERNAL MEDICINE | Facility: CLINIC | Age: 37
End: 2022-08-31

## 2022-08-31 ENCOUNTER — APPOINTMENT (OUTPATIENT)
Dept: LAB | Facility: HOSPITAL | Age: 37
End: 2022-08-31

## 2022-08-31 DIAGNOSIS — E22.1 HYPERPROLACTINEMIA: ICD-10-CM

## 2022-08-31 DIAGNOSIS — E22.1 HYPERPROLACTINEMIA: Primary | ICD-10-CM

## 2022-08-31 PROCEDURE — 36415 COLL VENOUS BLD VENIPUNCTURE: CPT

## 2022-08-31 PROCEDURE — 84403 ASSAY OF TOTAL TESTOSTERONE: CPT

## 2022-08-31 PROCEDURE — 84402 ASSAY OF FREE TESTOSTERONE: CPT

## 2022-08-31 NOTE — TELEPHONE ENCOUNTER
No need to repeat prolactin at this time.  He is still taking the cabergoline and prolactin will be low.  This was just checked 5 months ago.  His estradiol was normal at that time as well.  I see no reason to repeat estradiol.  Plan was to repeat testosterone to see if improving.

## 2022-08-31 NOTE — TELEPHONE ENCOUNTER
These should be ordered by Endocrinology. I am not sure who to send this note to- can you please forward it on?

## 2022-08-31 NOTE — TELEPHONE ENCOUNTER
SW PT. HE STATES HE HAS NOT BEEN TAKING CABERGOLINE FOR ABOUT 1 MONTH D/T FELT FATIGUED ON THE MEDICATION, BUT STILL FEELS FATIGUED. HE WANTS TO KNOW IF HE SHOULD START IT BACK.  PLEASE ADVISE.

## 2022-08-31 NOTE — TELEPHONE ENCOUNTER
PT IS CALLING BECAUSE HE IS AT THE LAB TO HAVE LAB WORK DONE BUT ONE OF THE ORDERS IS ONLY FOR TESTOSTERONE AND THE PT STATES THAT HE NORMALLY HAS AN ORDER FOR TESTOSTERONE ESTRIDOL AND PROLACTIN. PT STATES HE IS GOING TO HAVE ALL OTHER LABS DONE TODAY BUT HE WOULD LIKE TO SEE IF [facility] WILL PUT IN AN ORDER FOR THE TESTOSTERONE ESTRIDOL AND PROLACTIN. HE WILL GO BACK TO GET THIS LAB DONE IF [facility] WILL PUT IT IN.     PLEASE CALL THE PT BACK TO ADVISE.

## 2022-09-06 ENCOUNTER — TELEPHONE (OUTPATIENT)
Dept: ENDOCRINOLOGY | Facility: CLINIC | Age: 37
End: 2022-09-06

## 2022-09-08 ENCOUNTER — TELEPHONE (OUTPATIENT)
Dept: INTERNAL MEDICINE | Facility: CLINIC | Age: 37
End: 2022-09-08

## 2022-09-08 NOTE — TELEPHONE ENCOUNTER
Caller: Crystal Drummond    Relationship: Self    Best call back number: [phone]    Caller requesting test results: CRYSTAL    What test was performed: BLOOD TESTS     When was the test performed: 08/31/22    Where was the test performed: [facility] OFFICE     Additional notes: CRYSTAL IS CALLING FOR THE RESULTS

## 2022-09-09 NOTE — TELEPHONE ENCOUNTER
Left voice mail for patient that the lab was still in process but since Endo ordered that you will have to call them for the results.

## 2022-09-13 ENCOUNTER — TELEPHONE (OUTPATIENT)
Dept: ENDOCRINOLOGY | Facility: CLINIC | Age: 37
End: 2022-09-13

## 2022-09-13 NOTE — TELEPHONE ENCOUNTER
Spoke with lab as labs are still in process.  They state that results should be done in 6-10 business days.  Today is day 8.   Patient notified.

## 2022-09-13 NOTE — TELEPHONE ENCOUNTER
PATIENT CALLED FOR RECENT TESTOSTERONE RESULTS. PATIENT STATES THAT THIS WAS DRAWN AT [facility] LOCATION.

## 2022-09-14 LAB
TESTOST FREE MFR SERPL: 2.7 %
TESTOST FREE SERPL-MCNC: 28 PG/ML
TESTOST SERPL-MCNC: 103 NG/DL

## 2022-09-19 ENCOUNTER — TELEPHONE (OUTPATIENT)
Dept: ENDOCRINOLOGY | Facility: CLINIC | Age: 37
End: 2022-09-19

## 2022-09-19 DIAGNOSIS — E22.1 HYPERPROLACTINEMIA: Primary | ICD-10-CM

## 2022-09-19 NOTE — TELEPHONE ENCOUNTER
His testosterone level was low.  This was drawn in the afternoon, so not before 10 AM.  He reported to Kerri ROTH that he had stopped the cabergoline (see phone notes under 8/31/2022).  Plan was to repeat prolactin, but does not appear that he had this done yet.  The prolactin should be collected fasting (not at any particular time of day).  Please contact patient to see if he is going to have the prolactin drawn.  If so, I recommend repeating testosterone at that time and have blood drawn before 10 AM.  This will be required by insurance if considering testosterone replacement.

## 2022-09-21 ENCOUNTER — LAB (OUTPATIENT)
Dept: LAB | Facility: HOSPITAL | Age: 37
End: 2022-09-21

## 2022-09-21 DIAGNOSIS — E22.1 HYPERPROLACTINEMIA: ICD-10-CM

## 2022-09-21 LAB — PROLACTIN SERPL-MCNC: 4.19 NG/ML (ref 4.04–15.2)

## 2022-09-21 PROCEDURE — 84270 ASSAY OF SEX HORMONE GLOBUL: CPT

## 2022-09-21 PROCEDURE — 36415 COLL VENOUS BLD VENIPUNCTURE: CPT

## 2022-09-21 PROCEDURE — 84402 ASSAY OF FREE TESTOSTERONE: CPT

## 2022-09-21 PROCEDURE — 84403 ASSAY OF TOTAL TESTOSTERONE: CPT

## 2022-09-21 PROCEDURE — 84146 ASSAY OF PROLACTIN: CPT

## 2022-09-22 LAB — SHBG SERPL-SCNC: 7.4 NMOL/L (ref 16.5–55.9)

## 2022-09-29 LAB
TESTOST FREE MFR SERPL: 2.1 %
TESTOST FREE SERPL-MCNC: 27 PG/ML
TESTOST SERPL-MCNC: 129 NG/DL

## 2022-11-09 DIAGNOSIS — E22.1 HYPERPROLACTINEMIA: ICD-10-CM

## 2022-11-09 RX ORDER — CABERGOLINE 0.5 MG/1
TABLET ORAL
Qty: 8 TABLET | Refills: 11 | OUTPATIENT
Start: 2022-11-09

## 2022-11-09 NOTE — TELEPHONE ENCOUNTER
Caller: Sunny Drummond    Relationship: Self    Best call back number: [phone]    Requested Prescriptions:   Requested Prescriptions     Pending Prescriptions Disp Refills   • cabergoline (DOSTINEX) 0.5 MG tablet 8 tablet 11        Pharmacy where request should be sent: [facility] [id number] [address]  AT [facility] & [address] - [phone]  - [phone] FX     Additional details provided by patient: PATIENT WOULD LIKE TO START BACK ON THIS MEDICATION    Does the patient have less than a 3 day supply:  [x] Yes  [] No    Kait Arreola Rep   11/09/22 13:07 EST

## 2022-11-17 DIAGNOSIS — F51.01 PRIMARY INSOMNIA: ICD-10-CM

## 2022-11-17 DIAGNOSIS — N52.8 OTHER MALE ERECTILE DYSFUNCTION: ICD-10-CM

## 2022-11-17 RX ORDER — TADALAFIL 20 MG/1
20 TABLET ORAL DAILY PRN
Qty: 30 TABLET | Refills: 3 | OUTPATIENT
Start: 2022-11-17

## 2022-11-17 RX ORDER — SILDENAFIL 100 MG/1
TABLET, FILM COATED ORAL
Qty: 16 TABLET | Refills: 2 | OUTPATIENT
Start: 2022-11-17

## 2022-11-17 RX ORDER — TRAZODONE HYDROCHLORIDE 100 MG/1
100 TABLET ORAL NIGHTLY
Qty: 30 TABLET | Refills: 1 | OUTPATIENT
Start: 2022-11-17

## 2022-11-17 NOTE — TELEPHONE ENCOUNTER
Caller: Bhanu Sunny Allenon    Relationship: Self    Best call back number: [phone]    Requested Prescriptions:   Requested Prescriptions     Pending Prescriptions Disp Refills   • sildenafil (VIAGRA) 100 MG tablet 16 tablet 2   • traZODone (DESYREL) 100 MG tablet 30 tablet 1     Sig: Take 1 tablet by mouth Every Night.   • tadalafil (CIALIS) 20 MG tablet 30 tablet 3     Sig: Take 1 tablet by mouth Daily As Needed for Erectile Dysfunction.        Pharmacy where request should be sent: [facility] [id number] - [address]  AT [facility] & [address] - [phone]  - [phone] FX     Additional details provided by patient: COMPLETELY OUT    Does the patient have less than a 3 day supply:  [x] Yes  [] No    Kait Case Rep   11/17/22 10:50 EST

## 2022-11-21 NOTE — TELEPHONE ENCOUNTER
PT IS CALLING BECAUSE HE NEEDS REFILLS ON THESE MEDS. PT STATES THAT HE WAS JUST INT HE OFFICE LAST MONTH AND [facility] SAID THAT HE DIDN'T NEED THE APPOINTMENT AND NOT THAT HE IS TRYING TO GET HIS MEDS FILLED HE IS BEING TOLD HE NEEDS AN APPOINTMENT. THE PT IS FRUSTRATED BECAUSE HE HAS BEEN OUT OF THESE MEDS FOR A COUPLE OF WEEKS.     THE PT WOULD LIKE TO KNOW IF HE HAS TO HAVE AN APPOINTMENT OR IF HE CAN HAVE A REFILL OF HIS MEDICATIONS WITHOUT AN APPOINTMENT. PLEASE CALL THE PT BACK TO ADVISE.

## 2022-11-22 NOTE — TELEPHONE ENCOUNTER
The reason he came in for an appointment recently was to discuss lab results that Endocrinology ordered. We did not complete that appointment and he was to follow up with Endo.    I would like him to schedule an annual physical to go through his preventative/health maintenance.     I will refill his viagra/cialis and trazodone but need to confirm how he is taking them.

## 2023-03-13 DIAGNOSIS — N52.8 OTHER MALE ERECTILE DYSFUNCTION: ICD-10-CM

## 2023-03-14 RX ORDER — TADALAFIL 5 MG/1
TABLET ORAL
Qty: 90 TABLET | Refills: 1 | OUTPATIENT
Start: 2023-03-14

## 2023-03-22 ENCOUNTER — OFFICE VISIT (OUTPATIENT)
Dept: INTERNAL MEDICINE | Facility: CLINIC | Age: 38
End: 2023-03-22
Payer: COMMERCIAL

## 2023-03-22 VITALS
SYSTOLIC BLOOD PRESSURE: 124 MMHG | BODY MASS INDEX: 30.31 KG/M2 | DIASTOLIC BLOOD PRESSURE: 72 MMHG | OXYGEN SATURATION: 99 % | TEMPERATURE: 97.4 F | HEART RATE: 80 BPM | WEIGHT: 200 LBS | HEIGHT: 68 IN

## 2023-03-22 DIAGNOSIS — G89.29 CHRONIC RIGHT-SIDED THORACIC BACK PAIN: Primary | ICD-10-CM

## 2023-03-22 DIAGNOSIS — F51.01 PRIMARY INSOMNIA: ICD-10-CM

## 2023-03-22 DIAGNOSIS — N52.8 OTHER MALE ERECTILE DYSFUNCTION: ICD-10-CM

## 2023-03-22 DIAGNOSIS — M54.6 CHRONIC RIGHT-SIDED THORACIC BACK PAIN: Primary | ICD-10-CM

## 2023-03-22 PROCEDURE — 1160F RVW MEDS BY RX/DR IN RCRD: CPT | Performed by: PHYSICIAN ASSISTANT

## 2023-03-22 PROCEDURE — 99214 OFFICE O/P EST MOD 30 MIN: CPT | Performed by: PHYSICIAN ASSISTANT

## 2023-03-22 PROCEDURE — 1159F MED LIST DOCD IN RCRD: CPT | Performed by: PHYSICIAN ASSISTANT

## 2023-03-22 RX ORDER — TADALAFIL 20 MG/1
20 TABLET ORAL DAILY PRN
Qty: 30 TABLET | Refills: 1 | Status: SHIPPED | OUTPATIENT
Start: 2023-03-22

## 2023-03-22 RX ORDER — TRAZODONE HYDROCHLORIDE 100 MG/1
100 TABLET ORAL NIGHTLY
Qty: 90 TABLET | Refills: 1 | Status: SHIPPED | OUTPATIENT
Start: 2023-03-22

## 2023-03-22 NOTE — PROGRESS NOTES
Chief Complaint   Patient presents with   • Erectile Dysfunction   • Back Pain   • Insomnia       Subjective     Sunny Drummond is a 37 y.o. male.        History of Present Illness     Mr. Sunny Drummond is a 37-year-old male who presents to the clinic today for a follow-up.    He needs refills on his medications. He has been having back issues for a long time. He was referred to a specialist at [facility] and last saw them on 07/2022. He had trigger point injections on 06/2022. He wanted to see an orthopedic surgeon, so he referred him to Dr. Cabrera. He has been trying to get an appointment for 6 months; however, they never called him back. He was going to come back to the pain management as needed and then they ordered an MRI. He has left multiple voicemails and he is trying to get it figured out. He has had the same issue since he came here. Chiropractic care does not seem to relieve it but only for a short period of time. Medication does not seem to be helpful. The injections helped. He was told that it could only be every 3 months. He was told that he could come back after 09/2023. 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 He had it done at [facility]. He does not have a copy of the MRI.    He takes trazodone every night. Melatonin does not work for him. The trazodone seems to work.     He tried Cialis 5 mg; however, he needed to take as much as 5 for it to work. He and his wife have been together for 23 years. He rarely refills the Cialis and Viagra. He inquires if switching the Cialis would be a better idea. He never had this issue until he was put on finasteride. Does not want to stop it because it helps with hair growth.      Current Outpatient Medications:   •  finasteride (PROPECIA) 1 MG tablet, Take 1 tablet by mouth Daily., Disp: 30 tablet, Rfl: 1  •  ibuprofen (ADVIL,MOTRIN) 800 MG tablet, , Disp: , Rfl:   •  sildenafil (VIAGRA) 100  "MG tablet, TAKE ONE TABLET BY MOUTH DAILY AS NEEDED FOR ERECTILE DYSFUNCTION - DO NOT EXCEED MORE THAN 4 TIMES A WEEK, Disp: 16 tablet, Rfl: 2  •  tadalafil (CIALIS) 20 MG tablet, Take 1 tablet by mouth Daily As Needed for Erectile Dysfunction., Disp: 30 tablet, Rfl: 1  •  traZODone (DESYREL) 100 MG tablet, Take 1 tablet by mouth Every Night., Disp: 90 tablet, Rfl: 1     PMF  The following portions of the patient's history were reviewed and updated as appropriate: allergies, current medications, past family history, past medical history, past social history, past surgical history and problem list.    Review of Systems   Constitutional: Negative for activity change, appetite change and fatigue.   HENT: Negative for congestion and rhinorrhea.    Respiratory: Negative for chest tightness and shortness of breath.    Cardiovascular: Negative for chest pain and palpitations.   Gastrointestinal: Negative for abdominal pain.   Genitourinary: Negative for dysuria.   Musculoskeletal: Negative for arthralgias and myalgias.   Neurological: Negative for dizziness, weakness, light-headedness and headaches.   Psychiatric/Behavioral: Positive for sleep disturbance. Negative for dysphoric mood. The patient is not nervous/anxious.        Objective   /72   Pulse 80   Temp 97.4 °F (36.3 °C)   Ht 172.7 cm (68\")   Wt 90.7 kg (200 lb)   SpO2 99%   BMI 30.41 kg/m²      Physical Exam  Constitutional:       Appearance: He is well-developed.   HENT:      Head: Normocephalic and atraumatic.      Right Ear: External ear normal.      Left Ear: External ear normal.   Eyes:      Conjunctiva/sclera: Conjunctivae normal.   Cardiovascular:      Rate and Rhythm: Normal rate and regular rhythm.   Pulmonary:      Effort: Pulmonary effort is normal.      Breath sounds: Normal breath sounds.   Musculoskeletal:         General: Normal range of motion.      Cervical back: Normal range of motion.      Lumbar back: Tenderness present.   Skin:    "  General: Skin is warm and dry.   Psychiatric:         Behavior: Behavior normal.         Results for orders placed or performed in visit on 09/21/22   Prolactin    Specimen: Blood   Result Value Ref Range    Prolactin 4.19 4.04 - 15.20 ng/mL   Testosterone Free MS / Dialysis    Specimen: Blood   Result Value Ref Range    Testosterone, Total 129 (L) ng/dL    Testosterone, Free % 2.1 %    Testosterone, Free 27 (L) pg/mL   Sex Horm Binding Globulin    Specimen: Blood   Result Value Ref Range    Sex Hormone Binding Globulin 7.4 (L) 16.5 - 55.9 nmol/L        ASSESSMENT/PLAN    Diagnoses and all orders for this visit:    1. Chronic right-sided thoracic back pain (Primary)  Comments:  - Will send another referral to Dr. Cabrera.   Orders:  -     Ambulatory Referral to Orthopedic Surgery    2. Primary insomnia  Comments:  - Trazodone 50 MG tablet sent to his pharmacy.  Orders:  -     traZODone (DESYREL) 100 MG tablet; Take 1 tablet by mouth Every Night.  Dispense: 90 tablet; Refill: 1    3. Other male erectile dysfunction  Comments:  - Cialis 20 MG tablet sent to his pharmacy.  Orders:  -     tadalafil (CIALIS) 20 MG tablet; Take 1 tablet by mouth Daily As Needed for Erectile Dysfunction.  Dispense: 30 tablet; Refill: 1             Return in about 3 months (around 6/22/2023) for Annual with fasting labs.     Transcribed from ambient dictation for ANDRE Roque by Donna Crow.  03/22/23   12:08 EDT    Patient or patient representative verbalized consent to the visit recording.  I have personally performed the services described in this document as transcribed by the above individual, and it is both accurate and complete.

## 2023-04-28 DIAGNOSIS — N52.8 OTHER MALE ERECTILE DYSFUNCTION: ICD-10-CM

## 2023-04-28 RX ORDER — SILDENAFIL 100 MG/1
TABLET, FILM COATED ORAL
Qty: 16 TABLET | Refills: 2 | OUTPATIENT
Start: 2023-04-28

## 2023-04-28 NOTE — TELEPHONE ENCOUNTER
Caller: Sunny Drummond    Relationship: Self    Best call back number: [phone]    Requested Prescriptions:   Requested Prescriptions     Pending Prescriptions Disp Refills   • sildenafil (VIAGRA) 100 MG tablet 16 tablet 2        Pharmacy where request should be sent: [facility] [id number] [address]  AT [facility] & [address] - [phone]  - [phone] FX     Last office visit with prescribing clinician: 3/22/2023   Last telemedicine visit with prescribing clinician: 7/18/2023   Next office visit with prescribing clinician: 7/18/2023     Additional details provided by patient:     Does the patient have less than a 3 day supply:  [x] Yes  [] No    Would you like a call back once the refill request has been completed: [x] Yes [] No    If the office needs to give you a call back, can they leave a voicemail: [x] Yes [] No    Kait Ruvlacaba Rep   04/28/23 15:06 EDT

## 2023-05-04 ENCOUNTER — TELEPHONE (OUTPATIENT)
Dept: INTERNAL MEDICINE | Facility: CLINIC | Age: 38
End: 2023-05-04
Payer: COMMERCIAL

## 2023-05-04 DIAGNOSIS — N52.8 OTHER MALE ERECTILE DYSFUNCTION: ICD-10-CM

## 2023-05-04 RX ORDER — TADALAFIL 20 MG/1
20 TABLET ORAL DAILY PRN
Qty: 16 TABLET | Refills: 2 | Status: SHIPPED | OUTPATIENT
Start: 2023-05-04

## 2023-05-04 RX ORDER — SILDENAFIL 100 MG/1
100 TABLET, FILM COATED ORAL AS NEEDED
Qty: 16 TABLET | Refills: 2 | Status: SHIPPED | OUTPATIENT
Start: 2023-05-04

## 2023-05-04 NOTE — TELEPHONE ENCOUNTER
Patient called and feels there was a miscommunication regarding the Cialis and Viagra, he alternates between the two and never fills both at the same time and has been doing it this way for the last 5 years, states 2-3 refills on both last him a year, requesting refill on the Viagra

## 2023-10-02 DIAGNOSIS — L64.9 MALE PATTERN BALDNESS: ICD-10-CM

## 2023-10-02 DIAGNOSIS — N52.8 OTHER MALE ERECTILE DYSFUNCTION: ICD-10-CM

## 2023-10-03 RX ORDER — FINASTERIDE 1 MG/1
TABLET, FILM COATED ORAL
Qty: 90 TABLET | Refills: 3 | Status: SHIPPED | OUTPATIENT
Start: 2023-10-03

## 2023-10-03 RX ORDER — SILDENAFIL 100 MG/1
TABLET, FILM COATED ORAL
Qty: 16 TABLET | Refills: 3 | Status: SHIPPED | OUTPATIENT
Start: 2023-10-03

## 2023-10-03 RX ORDER — TADALAFIL 20 MG/1
TABLET ORAL
Qty: 16 TABLET | Refills: 3 | Status: SHIPPED | OUTPATIENT
Start: 2023-10-03

## 2023-11-12 ENCOUNTER — HOSPITAL ENCOUNTER (EMERGENCY)
Facility: HOSPITAL | Age: 38
Discharge: LEFT BEFORE DISCHARGE | End: 2023-11-12
Payer: COMMERCIAL

## 2023-11-12 VITALS
OXYGEN SATURATION: 97 % | RESPIRATION RATE: 16 BRPM | TEMPERATURE: 98 F | DIASTOLIC BLOOD PRESSURE: 108 MMHG | SYSTOLIC BLOOD PRESSURE: 155 MMHG | WEIGHT: 190 LBS | HEIGHT: 68 IN | BODY MASS INDEX: 28.79 KG/M2 | HEART RATE: 94 BPM

## 2023-11-12 PROCEDURE — 99211 OFF/OP EST MAY X REQ PHY/QHP: CPT

## 2024-02-05 ENCOUNTER — OFFICE VISIT (OUTPATIENT)
Dept: INTERNAL MEDICINE | Facility: CLINIC | Age: 39
End: 2024-02-05
Payer: COMMERCIAL

## 2024-02-05 VITALS
BODY MASS INDEX: 29.25 KG/M2 | WEIGHT: 193 LBS | TEMPERATURE: 98.5 F | RESPIRATION RATE: 16 BRPM | SYSTOLIC BLOOD PRESSURE: 128 MMHG | OXYGEN SATURATION: 98 % | DIASTOLIC BLOOD PRESSURE: 70 MMHG | HEIGHT: 68 IN | HEART RATE: 91 BPM

## 2024-02-05 DIAGNOSIS — R53.82 CHRONIC FATIGUE: ICD-10-CM

## 2024-02-05 DIAGNOSIS — N62 GYNECOMASTIA: ICD-10-CM

## 2024-02-05 DIAGNOSIS — Z13.228 SCREENING FOR ENDOCRINE, NUTRITIONAL, METABOLIC AND IMMUNITY DISORDER: ICD-10-CM

## 2024-02-05 DIAGNOSIS — E29.0 MALE HYPERTESTOSTERONEMIA: ICD-10-CM

## 2024-02-05 DIAGNOSIS — Z13.0 SCREENING FOR ENDOCRINE, NUTRITIONAL, METABOLIC AND IMMUNITY DISORDER: ICD-10-CM

## 2024-02-05 DIAGNOSIS — E22.1 HYPERPROLACTINEMIA: ICD-10-CM

## 2024-02-05 DIAGNOSIS — Z13.220 SCREENING, LIPID: ICD-10-CM

## 2024-02-05 DIAGNOSIS — Z13.29 SCREENING FOR ENDOCRINE, NUTRITIONAL, METABOLIC AND IMMUNITY DISORDER: ICD-10-CM

## 2024-02-05 DIAGNOSIS — Z13.21 SCREENING FOR ENDOCRINE, NUTRITIONAL, METABOLIC AND IMMUNITY DISORDER: ICD-10-CM

## 2024-02-05 DIAGNOSIS — Z00.00 WELLNESS EXAMINATION: Primary | ICD-10-CM

## 2024-02-05 PROCEDURE — 2014F MENTAL STATUS ASSESS: CPT | Performed by: FAMILY MEDICINE

## 2024-02-05 PROCEDURE — 1159F MED LIST DOCD IN RCRD: CPT | Performed by: FAMILY MEDICINE

## 2024-02-05 PROCEDURE — 1160F RVW MEDS BY RX/DR IN RCRD: CPT | Performed by: FAMILY MEDICINE

## 2024-02-05 PROCEDURE — 99395 PREV VISIT EST AGE 18-39: CPT | Performed by: FAMILY MEDICINE

## 2024-02-05 RX ORDER — CABERGOLINE 0.5 MG/1
0.5 TABLET ORAL 2 TIMES WEEKLY
COMMUNITY
End: 2024-02-07 | Stop reason: SDUPTHER

## 2024-02-05 NOTE — PROGRESS NOTES
Office Note     Name: Sunny Drummond    : 1985     MRN: [id number]     Chief Complaint  Annual Exam and Fatigue    Subjective     History of Present Illness:  Sunny Drummond is a 38 y.o. male who presents today for physical and establish care concerns for fatigue    Low energy- was on testosterone- came off.     Concerns for fatigue-   Cabergoline- increased prolactin   Has tried anti estrogens in the past   Will need to check labs- was following with Endocrinology- no recent labs in .     PMH-  Chronic Fatigue   Hyperprolactinemia   Male hypertestosteronemia   Gynecomastia   ED   Chronic back pain   Male pattern baldness   Sleep difficulty     Meds-  Finasteride 1mg daily   Sildenafil 100mg as needed   Tadalafil 20mg as needed   Trazodone 100mg nightly   Ibuprofen as needed   Cabergoline 0.5mg twice per week    Family Hx-   Maternal GM- - HTN, smoker   Maternal GF- - HTN, heart attack, alcohol, smoking   Paternal GM- alive- unsure   Paternal GF-    Mother- alive- prediabetes, HTN   Father- - cancer- pancreatic- alcohol   Sister- alive- Healthy     Alcohol 10-20 per day x 20 years. Non currently- very minimal   Smoking - vape occasionally- nicotine  Drug use - none   Job - - homes and businesses   Stress - 8/10    Sun Exposure - will try. No dermatology       Dental/Eye exams - up to date. Vision is stable   Diet/Exercise- Diet- Healthy   Exercise- 6 days per week- 1 hour in gym sometimes twice a day     Colonoscopy/Cologuard- not     Immunizations  Tdap- will hold- check records   Flu- postponed   COVID- none     I spent 20 minutes on the separately reported service of reviewing records, labs and counseling patient. This time is not included in the time used to support the E/M service also reported today.    Review of Systems:   Review of Systems   Constitutional:  Positive for fatigue. Negative for chills and fever.   HENT:  Negative for dental problem,  trouble swallowing and voice change.    Eyes:  Negative for visual disturbance.   Respiratory:  Negative for cough, chest tightness, shortness of breath and wheezing.    Cardiovascular:  Negative for chest pain, palpitations and leg swelling.   Gastrointestinal:  Negative for abdominal pain, blood in stool, constipation, diarrhea, nausea, vomiting and GERD.   Genitourinary:  Negative for dysuria.   Musculoskeletal:  Negative for gait problem.   Skin:  Negative for rash.   Neurological:  Negative for dizziness, seizures, speech difficulty, light-headedness and headache.   Hematological:  Does not bruise/bleed easily.   Psychiatric/Behavioral:  Positive for sleep disturbance. Negative for dysphoric mood, suicidal ideas and stress.        Past Medical History:   Past Medical History:   Diagnosis Date    Chronic back pain     Chronic fatigue 3/2/2021    Hyperprolactinemia 11/25/2020    Insomnia     Male hypertestosteronemia        Past Surgical History:   Past Surgical History:   Procedure Laterality Date    FINGER SURGERY Right        Immunizations:   There is no immunization history on file for this patient.     Medications:     Current Outpatient Medications:     cabergoline (DOSTINEX) 0.5 MG tablet, Take 1 tablet by mouth 2 (Two) Times a Week., Disp: , Rfl:     finasteride (PROPECIA) 1 MG tablet, TAKE ONE TABLET BY MOUTH DAILY, Disp: 90 tablet, Rfl: 3    ibuprofen (ADVIL,MOTRIN) 800 MG tablet, , Disp: , Rfl:     sildenafil (VIAGRA) 100 MG tablet, TAKE ONE TABLET BY MOUTH DAILY AS NEEDED FOR ERECTILE DYSFUNCTION DO NOT EXCEED MORE THAN 4 A WEEK. ALERNATE BETWEEN TADALAFIL AND SILDENAFIL, DO NOT TAKE BOTH AT THE SAME TIME, Disp: 16 tablet, Rfl: 3    tadalafil (CIALIS) 20 MG tablet, TAKE ONE TABLET BY MOUTH DAILY AS NEEDED FOR ERECTILE DYSFUNCTION DO NOT EXCEED MORE THAN 4 A WEEK. ALERNATE BETWEEN TABALADIL AND SILDENAFIL DO NOT TAKE BOTH AT SAME TIME, Disp: 16 tablet, Rfl: 3    traZODone (DESYREL) 100 MG tablet, Take  "1 tablet by mouth Every Night., Disp: 90 tablet, Rfl: 1    Allergies:   No Known Allergies    Family History:   Family History   Problem Relation Age of Onset    Diabetes Mother     Hypertension Mother     Cancer Father     Alcohol abuse Father     Pancreatic cancer Father     Heart disease Maternal Grandfather     Alcohol abuse Maternal Grandfather        Social History:   Social History     Socioeconomic History    Marital status:    Tobacco Use    Smoking status: Former     Packs/day: 0.25     Years: 10.00     Additional pack years: 0.00     Total pack years: 2.50     Types: Cigarettes     Quit date: 3/2/2018     Years since quittin.9    Smokeless tobacco: Former     Types: Snuff    Tobacco comments:     just when drinking   Vaping Use    Vaping Use: Former    Substances: Nicotine   Substance and Sexual Activity    Alcohol use: Yes     Comment: occasionally    Drug use: No    Sexual activity: Defer         Objective     Vital Signs  /70   Pulse 91   Temp 98.5 °F (36.9 °C) (Infrared)   Resp 16   Ht 172.7 cm (67.99\")   Wt 87.5 kg (193 lb)   SpO2 98%   BMI 29.35 kg/m²   Estimated body mass index is 29.35 kg/m² as calculated from the following:    Height as of this encounter: 172.7 cm (67.99\").    Weight as of this encounter: 87.5 kg (193 lb).    BMI is >= 25 and <30. (Overweight) The following options were offered after discussion;: exercise counseling/recommendations and nutrition counseling/recommendations      Physical Exam  Vitals and nursing note reviewed.   Constitutional:       General: He is not in acute distress.     Appearance: Normal appearance.   HENT:      Head: Normocephalic and atraumatic.      Right Ear: Tympanic membrane normal.      Left Ear: Tympanic membrane normal.      Nose: Nose normal.      Mouth/Throat:      Mouth: Mucous membranes are moist.   Eyes:      Extraocular Movements: Extraocular movements intact.      Conjunctiva/sclera: Conjunctivae normal.      Pupils: " Pupils are equal, round, and reactive to light.   Cardiovascular:      Rate and Rhythm: Normal rate and regular rhythm.      Heart sounds: Normal heart sounds.   Pulmonary:      Effort: Pulmonary effort is normal. No respiratory distress.      Breath sounds: Normal breath sounds.   Abdominal:      General: Bowel sounds are normal.      Palpations: Abdomen is soft.   Musculoskeletal:         General: Normal range of motion.      Cervical back: Normal range of motion.      Comments: Moving all extremities    Skin:     General: Skin is warm and dry.   Neurological:      General: No focal deficit present.      Mental Status: He is alert and oriented to person, place, and time.   Psychiatric:         Mood and Affect: Mood normal.         Behavior: Behavior normal.         Thought Content: Thought content normal.         Judgment: Judgment normal.          Procedures     Assessment and Plan   Diagnosis Discussed   Continue to monitor   Plenty of fluids, monitor diet and exercise   Labs ordered will notify of results  Immunizations up to date - will hold on Tdap   Take medications as instructed- awaiting lab results for refills if indicated.   Follow up as directed   If symptoms worsen or persist please seek further evaluation     1. Wellness examination    2. Screening for endocrine, nutritional, metabolic and immunity disorder  - CBC & Differential; Future  - Comprehensive Metabolic Panel; Future  - TSH Rfx On Abnormal To Free T4; Future  - Vitamin D,25-Hydroxy; Future    3. Screening, lipid  - Lipid Panel; Future    4. Male hypertestosteronemia  - Vitamin D,25-Hydroxy; Future  - Prolactin; Future  - Testosterone, Free, Total; Future  - Sex Horm Binding Globulin; Future  - Estradiol; Future    5. Gynecomastia  - Prolactin; Future  - Testosterone, Free, Total; Future  - Sex Horm Binding Globulin; Future  - Estradiol; Future    6. Hyperprolactinemia  - Vitamin D,25-Hydroxy; Future  - Prolactin; Future  - Testosterone,  Free, Total; Future  - Sex Horm Binding Globulin; Future  - Estradiol; Future    7. Chronic fatigue  - CBC & Differential; Future  - Vitamin D,25-Hydroxy; Future  - Testosterone, Free, Total; Future  - Sex Horm Binding Globulin; Future  - Estradiol; Future       Follow Up  Return in about 6 months (around 8/5/2024), or if symptoms worsen or fail to improve, for Recheck.    Daniela Pereyra MD  [facility] INTERNAL MEDICINE  [address] [place] [id number] [phone]

## 2024-02-05 NOTE — PATIENT INSTRUCTIONS
Diagnosis Discussed   Continue to monitor   Plenty of fluids, monitor diet and exercise   Labs ordered will notify of results   Immunizations up to date - will hold on Tdap   Take medications as instructed- awaiting lab results for refills   Follow up as directed   If symptoms worsen or persist please seek further evaluation

## 2024-02-06 ENCOUNTER — LAB (OUTPATIENT)
Dept: LAB | Facility: HOSPITAL | Age: 39
End: 2024-02-06
Payer: COMMERCIAL

## 2024-02-06 DIAGNOSIS — R53.82 CHRONIC FATIGUE: ICD-10-CM

## 2024-02-06 DIAGNOSIS — Z13.228 SCREENING FOR ENDOCRINE, NUTRITIONAL, METABOLIC AND IMMUNITY DISORDER: ICD-10-CM

## 2024-02-06 DIAGNOSIS — E22.1 HYPERPROLACTINEMIA: ICD-10-CM

## 2024-02-06 DIAGNOSIS — Z13.220 SCREENING, LIPID: ICD-10-CM

## 2024-02-06 DIAGNOSIS — Z13.29 SCREENING FOR ENDOCRINE, NUTRITIONAL, METABOLIC AND IMMUNITY DISORDER: ICD-10-CM

## 2024-02-06 DIAGNOSIS — Z13.0 SCREENING FOR ENDOCRINE, NUTRITIONAL, METABOLIC AND IMMUNITY DISORDER: ICD-10-CM

## 2024-02-06 DIAGNOSIS — Z13.21 SCREENING FOR ENDOCRINE, NUTRITIONAL, METABOLIC AND IMMUNITY DISORDER: ICD-10-CM

## 2024-02-06 DIAGNOSIS — E29.0 MALE HYPERTESTOSTERONEMIA: ICD-10-CM

## 2024-02-06 LAB
25(OH)D3 SERPL-MCNC: 37.7 NG/ML (ref 30–100)
ALBUMIN SERPL-MCNC: 4.1 G/DL (ref 3.5–5.2)
ALBUMIN/GLOB SERPL: 1.4 G/DL
ALP SERPL-CCNC: 47 U/L (ref 39–117)
ALT SERPL W P-5'-P-CCNC: 49 U/L (ref 1–41)
ANION GAP SERPL CALCULATED.3IONS-SCNC: 11.6 MMOL/L (ref 5–15)
AST SERPL-CCNC: 59 U/L (ref 1–40)
BASOPHILS # BLD AUTO: 0.2 10*3/MM3 (ref 0–0.2)
BASOPHILS NFR BLD AUTO: 2.2 % (ref 0–1.5)
BILIRUB SERPL-MCNC: 0.3 MG/DL (ref 0–1.2)
BUN SERPL-MCNC: 11 MG/DL (ref 6–20)
BUN/CREAT SERPL: 10 (ref 7–25)
CALCIUM SPEC-SCNC: 9.6 MG/DL (ref 8.6–10.5)
CHLORIDE SERPL-SCNC: 105 MMOL/L (ref 98–107)
CHOLEST SERPL-MCNC: 143 MG/DL (ref 0–200)
CO2 SERPL-SCNC: 22.4 MMOL/L (ref 22–29)
CREAT SERPL-MCNC: 1.1 MG/DL (ref 0.76–1.27)
DEPRECATED RDW RBC AUTO: 52.1 FL (ref 37–54)
EGFRCR SERPLBLD CKD-EPI 2021: 88.1 ML/MIN/1.73
EOSINOPHIL # BLD AUTO: 0.45 10*3/MM3 (ref 0–0.4)
EOSINOPHIL NFR BLD AUTO: 5 % (ref 0.3–6.2)
ERYTHROCYTE [DISTWIDTH] IN BLOOD BY AUTOMATED COUNT: 15.2 % (ref 12.3–15.4)
ESTRADIOL SERPL HS-MCNC: 154 PG/ML
GLOBULIN UR ELPH-MCNC: 2.9 GM/DL
GLUCOSE SERPL-MCNC: 89 MG/DL (ref 65–99)
HCT VFR BLD AUTO: 48.3 % (ref 37.5–51)
HDLC SERPL-MCNC: 12 MG/DL (ref 40–60)
HGB BLD-MCNC: 15.6 G/DL (ref 13–17.7)
IMM GRANULOCYTES # BLD AUTO: 0.03 10*3/MM3 (ref 0–0.05)
IMM GRANULOCYTES NFR BLD AUTO: 0.3 % (ref 0–0.5)
LDLC SERPL CALC-MCNC: 116 MG/DL (ref 0–100)
LDLC/HDLC SERPL: 9.68 {RATIO}
LYMPHOCYTES # BLD AUTO: 2.19 10*3/MM3 (ref 0.7–3.1)
LYMPHOCYTES NFR BLD AUTO: 24.2 % (ref 19.6–45.3)
MCH RBC QN AUTO: 29.8 PG (ref 26.6–33)
MCHC RBC AUTO-ENTMCNC: 32.3 G/DL (ref 31.5–35.7)
MCV RBC AUTO: 92.4 FL (ref 79–97)
MONOCYTES # BLD AUTO: 0.68 10*3/MM3 (ref 0.1–0.9)
MONOCYTES NFR BLD AUTO: 7.5 % (ref 5–12)
NEUTROPHILS NFR BLD AUTO: 5.5 10*3/MM3 (ref 1.7–7)
NEUTROPHILS NFR BLD AUTO: 60.8 % (ref 42.7–76)
NRBC BLD AUTO-RTO: 0 /100 WBC (ref 0–0.2)
PLATELET # BLD AUTO: 488 10*3/MM3 (ref 140–450)
PMV BLD AUTO: 11.7 FL (ref 6–12)
POTASSIUM SERPL-SCNC: 4.3 MMOL/L (ref 3.5–5.2)
PROLACTIN SERPL-MCNC: 25.7 NG/ML (ref 4.04–15.2)
PROT SERPL-MCNC: 7 G/DL (ref 6–8.5)
RBC # BLD AUTO: 5.23 10*6/MM3 (ref 4.14–5.8)
SODIUM SERPL-SCNC: 139 MMOL/L (ref 136–145)
TRIGL SERPL-MCNC: 74 MG/DL (ref 0–150)
TSH SERPL DL<=0.05 MIU/L-ACNC: 0.3 UIU/ML (ref 0.27–4.2)
VLDLC SERPL-MCNC: 15 MG/DL (ref 5–40)
WBC NRBC COR # BLD AUTO: 9.05 10*3/MM3 (ref 3.4–10.8)

## 2024-02-06 PROCEDURE — 36415 COLL VENOUS BLD VENIPUNCTURE: CPT

## 2024-02-06 PROCEDURE — 82306 VITAMIN D 25 HYDROXY: CPT

## 2024-02-06 PROCEDURE — 82670 ASSAY OF TOTAL ESTRADIOL: CPT

## 2024-02-06 PROCEDURE — 84270 ASSAY OF SEX HORMONE GLOBUL: CPT

## 2024-02-06 PROCEDURE — 84402 ASSAY OF FREE TESTOSTERONE: CPT

## 2024-02-06 PROCEDURE — 84403 ASSAY OF TOTAL TESTOSTERONE: CPT

## 2024-02-06 PROCEDURE — 80050 GENERAL HEALTH PANEL: CPT

## 2024-02-06 PROCEDURE — 84146 ASSAY OF PROLACTIN: CPT

## 2024-02-06 PROCEDURE — 80061 LIPID PANEL: CPT

## 2024-02-07 DIAGNOSIS — R53.82 CHRONIC FATIGUE: ICD-10-CM

## 2024-02-07 DIAGNOSIS — E22.1 HYPERPROLACTINEMIA: Primary | ICD-10-CM

## 2024-02-07 LAB — SHBG SERPL-SCNC: 4.6 NMOL/L (ref 16.5–55.9)

## 2024-02-07 RX ORDER — CABERGOLINE 0.5 MG/1
0.5 TABLET ORAL 2 TIMES WEEKLY
Qty: 24 TABLET | Refills: 1 | Status: SHIPPED | OUTPATIENT
Start: 2024-02-08

## 2024-02-07 NOTE — TELEPHONE ENCOUNTER
Caller: Sunny Drummond    Relationship: Self    Best call back number: [phone]     Requested Prescriptions:   Requested Prescriptions     Pending Prescriptions Disp Refills    cabergoline (DOSTINEX) 0.5 MG tablet       Sig: Take 1 tablet by mouth 2 (Two) Times a Week.      Pharmacy where request should be sent: [facility] [id number] [place] [address] AT [facility] [address]  - [phone]  - [phone] FX     Last office visit with prescribing clinician: 2/5/2024   Last telemedicine visit with prescribing clinician: Visit date not found   Next office visit with prescribing clinician: 8/5/2024     Additional details provided by patient: PATIENT IS OUT OF MEDICATION    THE PATIENT WILL BE GOING OUT OF TOWN EARLY TOMORROW MORNING AND WILL NOT BE BACK UNTIL NEXT WEEK. HE IS EXTREMELY LETHARGIC WITHOUT THIS MEDICATION. THE PATIENT WAS TOLD THE LAB RESULTS WOULD COME BACK TODAY. ONCE THESE DO CAN THIS MEDICATION BE SENT TODAY 02.07.24. THE PHARMACY IS AWARE AND WAITING ON A PRESCRIPTION.     Does the patient have less than a 3 day supply:  [x] Yes  [] No    Would you like a call back once the refill request has been completed: [x] Yes [] No    If the office needs to give you a call back, can they leave a voicemail: [] Yes [x] No    Kait Jay   02/07/24 12:02 EST

## 2024-02-08 ENCOUNTER — TELEPHONE (OUTPATIENT)
Dept: INTERNAL MEDICINE | Facility: CLINIC | Age: 39
End: 2024-02-08
Payer: COMMERCIAL

## 2024-02-08 PROBLEM — R74.8 ELEVATED LIVER ENZYMES: Status: ACTIVE | Noted: 2024-02-08

## 2024-02-08 NOTE — TELEPHONE ENCOUNTER
Name: Sunny Drummond    Relationship: Self    Best Callback Number:     HUB PROVIDED THE RELAY MESSAGE FROM THE OFFICE   PATIENT HAS QUESTIONS    ADDITIONAL INFORMATION:WOULD LIKE TO KNOW WHAT IS MENT BY GASTROENTEROLOGY WORK UP

## 2024-02-08 NOTE — TELEPHONE ENCOUNTER
I spoke with patient and he stated he has never been seen by gastroenterology. He also wanted to let you know he eats a clean diet and exercises and he is concerned about his good cholesterol. He wants to know if you have any other recommendations or anything he needs to change.

## 2024-02-08 NOTE — TELEPHONE ENCOUNTER
Lvm for patient to return call, hub to relay msg below              ----- Message from Daniela Pereyra MD sent at 2/8/2024 11:37 AM EST -----  Please advise patient of lab results   CBC- complete blood count- stable- no anemia. Increased platelets- improved from previous  TSH- thyroid stimulating hormone- normal   CMP- comprehensive metabolic panel- stable.   Glucose level- normal   Liver Function- Elevated Liver enzymes- most likely due from medications- will monitor   Kidney Function- normal   Lipid panel- stable. HDL (good cholesterol) Very low. Continue to monitor diet and exercise   Vitamin D level- low end of normal. Recommend supplement daily 1000-2000IU daily   Prolactin level- Elevated   Estradiol- Elevated   Sex binding globulin- low   Free and Total Testosterone- Pending.     Refill on cabergoline given- please follow up as directed. Gastroenterology work up previously? Please advise.

## 2024-02-09 ENCOUNTER — TELEPHONE (OUTPATIENT)
Dept: INTERNAL MEDICINE | Facility: CLINIC | Age: 39
End: 2024-02-09
Payer: COMMERCIAL

## 2024-02-14 ENCOUNTER — TELEPHONE (OUTPATIENT)
Dept: INTERNAL MEDICINE | Facility: CLINIC | Age: 39
End: 2024-02-14

## 2024-02-14 NOTE — TELEPHONE ENCOUNTER
Caller: uSnny Drummond    Relationship: Self    Best call back number: [phone]     What is the medical concern/diagnosis: DRY SKIN ON FACE AND SCALP    What specialty or service is being requested: DERMATOLOGY     Any additional details: PATIENT STATES HE HAS BEEN REFERRED TO A DERMATOLOGIST BEFORE BUT HE DOES NOT REMEMBER WHO THAT WAS BUT HE WOULD LIKE SOMEONE DR. CHAVEZ RECOMMENDS.

## 2024-02-14 NOTE — TELEPHONE ENCOUNTER
Called pt back and gave him information to the [facility] along with the number .

## 2024-02-16 LAB
TESTOST FREE SERPL-MCNC: >50 PG/ML (ref 8.7–25.1)
TESTOST SERPL-MCNC: >1500 NG/DL (ref 264–916)

## 2024-02-19 NOTE — TELEPHONE ENCOUNTER
Patient stated he called the dermatologist and they are booking out until May. He is wanting to know if he could get some form of medication to help with dry skin. He stated he will schedule an appointment if he needs to be seen.

## 2024-02-21 ENCOUNTER — TELEPHONE (OUTPATIENT)
Dept: INTERNAL MEDICINE | Facility: CLINIC | Age: 39
End: 2024-02-21
Payer: COMMERCIAL

## 2024-02-21 NOTE — TELEPHONE ENCOUNTER
Hub to relay    Left message for patient that per Dr Pereyra he needs to make an appointment for evaluation. Please schedule appt with pcp if patient returns call.

## 2024-02-21 NOTE — TELEPHONE ENCOUNTER
Name: Sunny Drummond    Relationship: Self    Best Callback Number:     [phone]       HUB PROVIDED THE RELAY MESSAGE FROM THE OFFICE   PATIENT SCHEDULED AS REQUESTED

## 2024-02-23 ENCOUNTER — OFFICE VISIT (OUTPATIENT)
Dept: INTERNAL MEDICINE | Facility: CLINIC | Age: 39
End: 2024-02-23
Payer: COMMERCIAL

## 2024-02-23 VITALS
WEIGHT: 187 LBS | BODY MASS INDEX: 28.34 KG/M2 | OXYGEN SATURATION: 98 % | SYSTOLIC BLOOD PRESSURE: 138 MMHG | DIASTOLIC BLOOD PRESSURE: 70 MMHG | TEMPERATURE: 98.3 F | HEART RATE: 89 BPM | HEIGHT: 68 IN

## 2024-02-23 DIAGNOSIS — L21.9 SEBORRHEIC DERMATITIS: Primary | ICD-10-CM

## 2024-02-23 RX ORDER — KETOCONAZOLE 20 MG/ML
SHAMPOO TOPICAL 2 TIMES WEEKLY
Qty: 120 ML | Refills: 1 | Status: SHIPPED | OUTPATIENT
Start: 2024-02-26

## 2024-02-23 RX ORDER — KETOCONAZOLE 20 MG/G
CREAM TOPICAL
Qty: 30 G | Refills: 1 | Status: SHIPPED | OUTPATIENT
Start: 2024-02-23

## 2024-02-23 NOTE — PROGRESS NOTES
Office Note     Name: Sunny Drummond    : 1985     MRN: [id number]     Chief Complaint  Eczema (Referral)    Subjective     History of Present Illness:  Sunny Drummond is a 38 y.o. male who presents today for dry skin- scalp, face. Thick patchy. Keratosis like. Not very itchy     Flaking- skin- worse on scalp  Irritated skin   Tries to stay hydrated.   Seems to be worsen during winter months has been worsening over last few months   Flaking worse on scalp- hairline and beard       Review of Systems:   Review of Systems   Constitutional:  Positive for fatigue. Negative for chills and fever.   Respiratory:  Negative for cough and shortness of breath.    Cardiovascular:  Negative for palpitations.   Gastrointestinal:  Negative for abdominal pain, nausea and vomiting.   Skin:  Positive for dry skin.   Neurological:  Negative for light-headedness and headache.       Past Medical History:   Past Medical History:   Diagnosis Date    Chronic back pain     Chronic fatigue 3/2/2021    Hyperprolactinemia 2020    Insomnia     Male hypertestosteronemia        Past Surgical History:   Past Surgical History:   Procedure Laterality Date    FINGER SURGERY Right        Immunizations:   There is no immunization history on file for this patient.     Medications:     Current Outpatient Medications:     cabergoline (DOSTINEX) 0.5 MG tablet, Take 1 tablet by mouth 2 (Two) Times a Week., Disp: 24 tablet, Rfl: 1    finasteride (PROPECIA) 1 MG tablet, TAKE ONE TABLET BY MOUTH DAILY, Disp: 90 tablet, Rfl: 3    ibuprofen (ADVIL,MOTRIN) 800 MG tablet, , Disp: , Rfl:     sildenafil (VIAGRA) 100 MG tablet, TAKE ONE TABLET BY MOUTH DAILY AS NEEDED FOR ERECTILE DYSFUNCTION DO NOT EXCEED MORE THAN 4 A WEEK. ALERNATE BETWEEN TADALAFIL AND SILDENAFIL, DO NOT TAKE BOTH AT THE SAME TIME, Disp: 16 tablet, Rfl: 3    tadalafil (CIALIS) 20 MG tablet, TAKE ONE TABLET BY MOUTH DAILY AS NEEDED FOR ERECTILE DYSFUNCTION DO NOT EXCEED MORE  "THAN 4 A WEEK. ALERNATE BETWEEN TABALADIL AND SILDENAFIL DO NOT TAKE BOTH AT SAME TIME, Disp: 16 tablet, Rfl: 3    traZODone (DESYREL) 100 MG tablet, Take 1 tablet by mouth Every Night., Disp: 90 tablet, Rfl: 1    ketoconazole (NIZORAL) 2 % cream, Apply to affected areas in thins layer BID as needed, Disp: 30 g, Rfl: 1    [START ON 2024] ketoconazole (NIZORAL) 2 % shampoo, Apply  topically to the appropriate area as directed 2 (Two) Times a Week. Let sit 5 minutes on scalp, then rinse., Disp: 120 mL, Rfl: 1    Allergies:   No Known Allergies    Family History:   Family History   Problem Relation Age of Onset    Diabetes Mother     Hypertension Mother     Cancer Father     Alcohol abuse Father     Pancreatic cancer Father     Heart disease Maternal Grandfather     Alcohol abuse Maternal Grandfather        Social History:   Social History     Socioeconomic History    Marital status:    Tobacco Use    Smoking status: Former     Packs/day: 0.25     Years: 10.00     Additional pack years: 0.00     Total pack years: 2.50     Types: Cigarettes     Quit date: 3/2/2018     Years since quittin.9     Passive exposure: Past    Smokeless tobacco: Former     Types: Snuff    Tobacco comments:     just when drinking   Vaping Use    Vaping Use: Former    Substances: Nicotine   Substance and Sexual Activity    Alcohol use: Yes     Comment: occasionally    Drug use: No    Sexual activity: Defer         Objective     Vital Signs  /70   Pulse 89   Temp 98.3 °F (36.8 °C) (Infrared)   Ht 172.7 cm (67.99\")   Wt 84.8 kg (187 lb)   SpO2 98%   BMI 28.44 kg/m²   Estimated body mass index is 28.44 kg/m² as calculated from the following:    Height as of this encounter: 172.7 cm (67.99\").    Weight as of this encounter: 84.8 kg (187 lb).            Physical Exam  Vitals and nursing note reviewed.   Constitutional:       Appearance: Normal appearance.   HENT:      Head: Normocephalic and atraumatic.   Cardiovascular:    "   Rate and Rhythm: Normal rate.   Pulmonary:      Effort: Pulmonary effort is normal.   Musculoskeletal:         General: Normal range of motion.   Skin:     General: Skin is warm and dry.      Comments: Dry flaking skin- worse on scalp and beard   Seborrheic dermatitis    Neurological:      General: No focal deficit present.      Mental Status: He is alert and oriented to person, place, and time.          Procedures     Assessment and Plan   Diagnosis Discussed   Continue to monitor   Plenty of fluids  Take medications as directed  Follow up with Dermatology as directed   If symptoms worsen or persist please seek further evaluation     1. Seborrheic dermatitis  - ketoconazole (NIZORAL) 2 % shampoo; Apply  topically to the appropriate area as directed 2 (Two) Times a Week. Let sit 5 minutes on scalp, then rinse.  Dispense: 120 mL; Refill: 1  - ketoconazole (NIZORAL) 2 % cream; Apply to affected areas in thins layer BID as needed  Dispense: 30 g; Refill: 1       Follow Up  Return if symptoms worsen or fail to improve, for Next scheduled follow up.    Daniela Pereyra MD  [facility]  [address] [place] [id number] [phone]

## 2024-02-23 NOTE — PATIENT INSTRUCTIONS
Diagnosis Discussed   Continue to monitor   Plenty of fluids  Take medications as directed  Follow up with Dermatology as directed   If symptoms worsen or persist please seek further evaluation

## 2024-03-23 ENCOUNTER — HOSPITAL ENCOUNTER (EMERGENCY)
Facility: HOSPITAL | Age: 39
Discharge: HOME | End: 2024-03-23
Attending: EMERGENCY MEDICINE
Payer: COMMERCIAL

## 2024-03-23 VITALS
HEIGHT: 68 IN | HEART RATE: 98 BPM | SYSTOLIC BLOOD PRESSURE: 145 MMHG | BODY MASS INDEX: 28.04 KG/M2 | DIASTOLIC BLOOD PRESSURE: 87 MMHG | WEIGHT: 185 LBS | TEMPERATURE: 98.8 F | RESPIRATION RATE: 18 BRPM | OXYGEN SATURATION: 100 %

## 2024-03-23 DIAGNOSIS — L03.114 CELLULITIS OF LEFT ARM: Primary | ICD-10-CM

## 2024-03-23 LAB
ALBUMIN SERPL-MCNC: 4 G/DL (ref 3.5–5.2)
ALBUMIN/GLOB SERPL: 1.5 G/DL
ALP SERPL-CCNC: 50 U/L (ref 39–117)
ALT SERPL W P-5'-P-CCNC: 36 U/L (ref 1–41)
ANION GAP SERPL CALCULATED.3IONS-SCNC: 8 MMOL/L (ref 5–15)
AST SERPL-CCNC: 38 U/L (ref 1–40)
BASOPHILS # BLD AUTO: 0.08 10*3/MM3 (ref 0–0.2)
BASOPHILS NFR BLD AUTO: 0.8 % (ref 0–1.5)
BILIRUB SERPL-MCNC: 0.5 MG/DL (ref 0–1.2)
BUN SERPL-MCNC: 10 MG/DL (ref 6–20)
BUN/CREAT SERPL: 12.2 (ref 7–25)
CALCIUM SPEC-SCNC: 8.6 MG/DL (ref 8.6–10.5)
CHLORIDE SERPL-SCNC: 107 MMOL/L (ref 98–107)
CO2 SERPL-SCNC: 25 MMOL/L (ref 22–29)
CREAT SERPL-MCNC: 0.82 MG/DL (ref 0.76–1.27)
D-LACTATE SERPL-SCNC: 1.2 MMOL/L (ref 0.5–2)
DEPRECATED RDW RBC AUTO: 61.5 FL (ref 37–54)
EGFRCR SERPLBLD CKD-EPI 2021: 115.3 ML/MIN/1.73
EOSINOPHIL # BLD AUTO: 0.11 10*3/MM3 (ref 0–0.4)
EOSINOPHIL NFR BLD AUTO: 1.1 % (ref 0.3–6.2)
ERYTHROCYTE [DISTWIDTH] IN BLOOD BY AUTOMATED COUNT: 18.9 % (ref 12.3–15.4)
GLOBULIN UR ELPH-MCNC: 2.7 GM/DL
GLUCOSE SERPL-MCNC: 112 MG/DL (ref 65–99)
HCT VFR BLD AUTO: 38.6 % (ref 37.5–51)
HGB BLD-MCNC: 13 G/DL (ref 13–17.7)
IMM GRANULOCYTES # BLD AUTO: 0.03 10*3/MM3 (ref 0–0.05)
IMM GRANULOCYTES NFR BLD AUTO: 0.3 % (ref 0–0.5)
LYMPHOCYTES # BLD AUTO: 1.52 10*3/MM3 (ref 0.7–3.1)
LYMPHOCYTES NFR BLD AUTO: 14.9 % (ref 19.6–45.3)
MCH RBC QN AUTO: 29.9 PG (ref 26.6–33)
MCHC RBC AUTO-ENTMCNC: 33.7 G/DL (ref 31.5–35.7)
MCV RBC AUTO: 88.7 FL (ref 79–97)
MONOCYTES # BLD AUTO: 0.87 10*3/MM3 (ref 0.1–0.9)
MONOCYTES NFR BLD AUTO: 8.5 % (ref 5–12)
NEUTROPHILS NFR BLD AUTO: 7.6 10*3/MM3 (ref 1.7–7)
NEUTROPHILS NFR BLD AUTO: 74.4 % (ref 42.7–76)
NRBC BLD AUTO-RTO: 0 /100 WBC (ref 0–0.2)
PLATELET # BLD AUTO: 307 10*3/MM3 (ref 140–450)
PMV BLD AUTO: 10.5 FL (ref 6–12)
POTASSIUM SERPL-SCNC: 4 MMOL/L (ref 3.5–5.2)
PROT SERPL-MCNC: 6.7 G/DL (ref 6–8.5)
RBC # BLD AUTO: 4.35 10*6/MM3 (ref 4.14–5.8)
SODIUM SERPL-SCNC: 140 MMOL/L (ref 136–145)
URATE SERPL-MCNC: 3 MG/DL (ref 3.4–7)
WBC NRBC COR # BLD AUTO: 10.21 10*3/MM3 (ref 3.4–10.8)

## 2024-03-23 PROCEDURE — 99283 EMERGENCY DEPT VISIT LOW MDM: CPT

## 2024-03-23 PROCEDURE — 85025 COMPLETE CBC W/AUTO DIFF WBC: CPT | Performed by: EMERGENCY MEDICINE

## 2024-03-23 PROCEDURE — 84550 ASSAY OF BLOOD/URIC ACID: CPT | Performed by: EMERGENCY MEDICINE

## 2024-03-23 PROCEDURE — 80053 COMPREHEN METABOLIC PANEL: CPT | Performed by: EMERGENCY MEDICINE

## 2024-03-23 PROCEDURE — 87040 BLOOD CULTURE FOR BACTERIA: CPT | Performed by: EMERGENCY MEDICINE

## 2024-03-23 PROCEDURE — 25010000002 KETOROLAC TROMETHAMINE PER 15 MG: Performed by: EMERGENCY MEDICINE

## 2024-03-23 PROCEDURE — 96365 THER/PROPH/DIAG IV INF INIT: CPT

## 2024-03-23 PROCEDURE — 25810000003 SODIUM CHLORIDE 0.9 % SOLUTION: Performed by: EMERGENCY MEDICINE

## 2024-03-23 PROCEDURE — 36415 COLL VENOUS BLD VENIPUNCTURE: CPT

## 2024-03-23 PROCEDURE — 96375 TX/PRO/DX INJ NEW DRUG ADDON: CPT

## 2024-03-23 PROCEDURE — 25010000002 CEFTRIAXONE PER 250 MG: Performed by: EMERGENCY MEDICINE

## 2024-03-23 PROCEDURE — 83605 ASSAY OF LACTIC ACID: CPT | Performed by: EMERGENCY MEDICINE

## 2024-03-23 RX ORDER — CEPHALEXIN 500 MG/1
500 CAPSULE ORAL 4 TIMES DAILY
Qty: 40 CAPSULE | Refills: 0 | Status: SHIPPED | OUTPATIENT
Start: 2024-03-23 | End: 2024-04-02

## 2024-03-23 RX ORDER — SULFAMETHOXAZOLE AND TRIMETHOPRIM 800; 160 MG/1; MG/1
1 TABLET ORAL 2 TIMES DAILY
Qty: 20 TABLET | Refills: 0 | Status: SHIPPED | OUTPATIENT
Start: 2024-03-23 | End: 2024-04-02

## 2024-03-23 RX ORDER — SODIUM CHLORIDE 0.9 % (FLUSH) 0.9 %
10 SYRINGE (ML) INJECTION AS NEEDED
Status: DISCONTINUED | OUTPATIENT
Start: 2024-03-23 | End: 2024-03-23 | Stop reason: HOSPADM

## 2024-03-23 RX ORDER — KETOROLAC TROMETHAMINE 30 MG/ML
30 INJECTION, SOLUTION INTRAMUSCULAR; INTRAVENOUS ONCE
Status: COMPLETED | OUTPATIENT
Start: 2024-03-23 | End: 2024-03-23

## 2024-03-23 RX ORDER — SULFAMETHOXAZOLE AND TRIMETHOPRIM 800; 160 MG/1; MG/1
1 TABLET ORAL ONCE
Status: COMPLETED | OUTPATIENT
Start: 2024-03-23 | End: 2024-03-23

## 2024-03-23 RX ORDER — VANCOMYCIN 1.75 GRAM/500 ML IN 0.9 % SODIUM CHLORIDE INTRAVENOUS
20 ONCE
Qty: 500 ML | Refills: 0 | Status: DISCONTINUED | OUTPATIENT
Start: 2024-03-23 | End: 2024-03-23

## 2024-03-23 RX ORDER — SACCHAROMYCES BOULARDII 250 MG
250 CAPSULE ORAL 2 TIMES DAILY
Qty: 20 CAPSULE | Refills: 0 | Status: SHIPPED | OUTPATIENT
Start: 2024-03-23 | End: 2024-04-02

## 2024-03-23 RX ADMIN — SODIUM CHLORIDE 1000 ML: 9 INJECTION, SOLUTION INTRAVENOUS at 18:18

## 2024-03-23 RX ADMIN — KETOROLAC TROMETHAMINE 30 MG: 30 INJECTION, SOLUTION INTRAMUSCULAR; INTRAVENOUS at 18:17

## 2024-03-23 RX ADMIN — SODIUM CHLORIDE 1000 MG: 900 INJECTION INTRAVENOUS at 18:22

## 2024-03-23 RX ADMIN — SULFAMETHOXAZOLE AND TRIMETHOPRIM 1 TABLET: 800; 160 TABLET ORAL at 19:13

## 2024-03-23 NOTE — ED PROVIDER NOTES
Subjective   History of Present Illness  Patient is a pleasant 38-year-old male presents to the emergency department with concern for a left wrist infection.  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  Is just proximal to the left thenar eminence and extending up proximally up the forearm.  He states that although it is slightly discomfort he did not have significant discomfort until 24 hours ago.  Over the past 24 hours the pain has been worse.  He went to urgent treatment center earlier today x-rays were performed which did not note any acute pathology.  Given the erythema and tenderness and mild swelling there is concern for infection which prompted his referral to the emergency department.  Other than the pain he had denies fever, chills, chest pain, shortness of breath, abdominal pain, nausea, vomiting, diarrhea, or other acute complaints.  Denies similar episodes in the past.  Patient is not immunocompromise.  No diabetes or autoimmune conditions.      Review of Systems   All other systems reviewed and are negative.      Past Medical History:   Diagnosis Date    Chronic back pain     Chronic fatigue 3/2/2021    Hyperprolactinemia 2020    Insomnia     Male hypertestosteronemia        No Known Allergies    Past Surgical History:   Procedure Laterality Date    FINGER SURGERY Right        Family History   Problem Relation Age of Onset    Diabetes Mother     Hypertension Mother     Cancer Father     Alcohol abuse Father     Pancreatic cancer Father     Heart disease Maternal Grandfather     Alcohol abuse Maternal Grandfather        Social History     Socioeconomic History    Marital status:    Tobacco Use    Smoking status: Former     Current packs/day: 0.00     Average packs/day: 0.3 packs/day for 10.0 years (2.5 ttl pk-yrs)     Types: Cigarettes     Start date: 3/2/2008     Quit date: 3/2/2018     Years since quittin.0      Passive exposure: Past    Smokeless tobacco: Former     Types: Snuff    Tobacco comments:     just when drinking   Vaping Use    Vaping status: Former    Substances: Nicotine   Substance and Sexual Activity    Alcohol use: Yes     Comment: occasionally    Drug use: No    Sexual activity: Defer           Objective   Physical Exam  Vitals and nursing note reviewed.   Constitutional:       General: He is not in acute distress.  HENT:      Head: Normocephalic and atraumatic.   Eyes:      Conjunctiva/sclera: Conjunctivae normal.      Pupils: Pupils are equal, round, and reactive to light.   Neck:      Thyroid: No thyromegaly.   Cardiovascular:      Rate and Rhythm: Normal rate and regular rhythm.      Heart sounds: Normal heart sounds. No murmur heard.     No friction rub. No gallop.   Pulmonary:      Effort: Pulmonary effort is normal. No respiratory distress.      Breath sounds: Normal breath sounds.   Abdominal:      General: Bowel sounds are normal.      Palpations: Abdomen is soft.      Tenderness: There is no abdominal tenderness.   Musculoskeletal:         General: Swelling and tenderness present. Normal range of motion.        Arms:       Cervical back: Normal range of motion and neck supple.      Comments: Left thenar eminence along with the wrist and forearm proximal to this location are erythematous and tender to palpation.  Patient has noticed erythema extending up his forearm and worsening throughout the day today.  No significant tenderness to palpation throughout the remainder of the wrist.  The remainder the wrist also has not warm or swollen.   Lymphadenopathy:      Cervical: No cervical adenopathy.   Skin:     General: Skin is warm and dry.      Capillary Refill: Capillary refill takes less than 2 seconds.      Findings: Erythema present.   Neurological:      General: No focal deficit present.      Mental Status: He is alert and oriented to person, place, and time.   Psychiatric:         Behavior:  Behavior normal.         Thought Content: Thought content normal.         Procedures           ED Course      Recent Results (from the past 24 hour(s))   Comprehensive Metabolic Panel    Collection Time: 03/23/24  5:50 PM    Specimen: Blood   Result Value Ref Range    Glucose 112 (H) 65 - 99 mg/dL    BUN 10 6 - 20 mg/dL    Creatinine 0.82 0.76 - 1.27 mg/dL    Sodium 140 136 - 145 mmol/L    Potassium 4.0 3.5 - 5.2 mmol/L    Chloride 107 98 - 107 mmol/L    CO2 25.0 22.0 - 29.0 mmol/L    Calcium 8.6 8.6 - 10.5 mg/dL    Total Protein 6.7 6.0 - 8.5 g/dL    Albumin 4.0 3.5 - 5.2 g/dL    ALT (SGPT) 36 1 - 41 U/L    AST (SGOT) 38 1 - 40 U/L    Alkaline Phosphatase 50 39 - 117 U/L    Total Bilirubin 0.5 0.0 - 1.2 mg/dL    Globulin 2.7 gm/dL    A/G Ratio 1.5 g/dL    BUN/Creatinine Ratio 12.2 7.0 - 25.0    Anion Gap 8.0 5.0 - 15.0 mmol/L    eGFR 115.3 >60.0 mL/min/1.73   Lactic Acid, Plasma    Collection Time: 03/23/24  5:50 PM    Specimen: Blood   Result Value Ref Range    Lactate 1.2 0.5 - 2.0 mmol/L   CBC Auto Differential    Collection Time: 03/23/24  5:50 PM    Specimen: Blood   Result Value Ref Range    WBC 10.21 3.40 - 10.80 10*3/mm3    RBC 4.35 4.14 - 5.80 10*6/mm3    Hemoglobin 13.0 13.0 - 17.7 g/dL    Hematocrit 38.6 37.5 - 51.0 %    MCV 88.7 79.0 - 97.0 fL    MCH 29.9 26.6 - 33.0 pg    MCHC 33.7 31.5 - 35.7 g/dL    RDW 18.9 (H) 12.3 - 15.4 %    RDW-SD 61.5 (H) 37.0 - 54.0 fl    MPV 10.5 6.0 - 12.0 fL    Platelets 307 140 - 450 10*3/mm3    Neutrophil % 74.4 42.7 - 76.0 %    Lymphocyte % 14.9 (L) 19.6 - 45.3 %    Monocyte % 8.5 5.0 - 12.0 %    Eosinophil % 1.1 0.3 - 6.2 %    Basophil % 0.8 0.0 - 1.5 %    Immature Grans % 0.3 0.0 - 0.5 %    Neutrophils, Absolute 7.60 (H) 1.70 - 7.00 10*3/mm3    Lymphocytes, Absolute 1.52 0.70 - 3.10 10*3/mm3    Monocytes, Absolute 0.87 0.10 - 0.90 10*3/mm3    Eosinophils, Absolute 0.11 0.00 - 0.40 10*3/mm3    Basophils, Absolute 0.08 0.00 - 0.20 10*3/mm3    Immature Grans, Absolute  0.03 0.00 - 0.05 10*3/mm3    nRBC 0.0 0.0 - 0.2 /100 WBC   Uric Acid    Collection Time: 03/23/24  5:50 PM    Specimen: Blood   Result Value Ref Range    Uric Acid 3.0 (L) 3.4 - 7.0 mg/dL     Note: In addition to lab results from this visit, the labs listed above may include labs taken at another facility or during a different encounter within the last 24 hours. Please correlate lab times with ED admission and discharge times for further clarification of the services performed during this visit.    No orders to display     Vitals:    03/23/24 1800 03/23/24 1914 03/23/24 1920 03/23/24 1925   BP: 123/68 145/87     BP Location:       Patient Position:       Pulse: 103   98   Resp:       Temp:   98.8 °F (37.1 °C)    TempSrc:   Axillary    SpO2: 98%   100%   Weight:       Height:         Medications   sodium chloride 0.9 % bolus 1,000 mL (0 mL Intravenous Stopped 3/23/24 1924)   ketorolac (TORADOL) injection 30 mg (30 mg Intravenous Given 3/23/24 1817)   cefTRIAXone (ROCEPHIN) 1,000 mg in sodium chloride 0.9 % 100 mL MBP (0 mg Intravenous Stopped 3/23/24 1905)   sulfamethoxazole-trimethoprim (BACTRIM DS,SEPTRA DS) 800-160 MG per tablet 1 tablet (1 tablet Oral Given 3/23/24 1913)     ECG/EMG Results (last 24 hours)       ** No results found for the last 24 hours. **          No orders to display     XR Wrist 3+ View Left    Result Date: 3/23/2024  XR WRIST 3+ VW LEFT, XR FOREARM 2 VW LEFT Date of Exam: 3/23/2024 3:56 PM EDT Indication: swelling Comparison: None available. Findings: Left wrist: There is no fracture. Alignment seems reasonable. There our small osseous bodies adjacent to the base of the trapezium. This might reflect sequela to old trauma or degenerative process. Forearm: There is no fracture. The osseous mineralization does not appear unusual. There is no radiopaque foreign body. There is some soft tissue calcification noted adjacent to the pisiform.     Impression: 1.Nonspecific calcific/ossific bodies  adjacent to the base of the trapezium as well as pisiform. 2.No definite acute fracture of the wrist or forearm. Electronically Signed: Jermaine Ramirez MD  3/23/2024 4:17 PM EDT  Workstation ID: [id number]    XR Forearm 2 View Left    Result Date: 3/23/2024  XR WRIST 3+ VW LEFT, XR FOREARM 2 VW LEFT Date of Exam: 3/23/2024 3:56 PM EDT Indication: swelling Comparison: None available. Findings: Left wrist: There is no fracture. Alignment seems reasonable. There our small osseous bodies adjacent to the base of the trapezium. This might reflect sequela to old trauma or degenerative process. Forearm: There is no fracture. The osseous mineralization does not appear unusual. There is no radiopaque foreign body. There is some soft tissue calcification noted adjacent to the pisiform.     Impression: 1.Nonspecific calcific/ossific bodies adjacent to the base of the trapezium as well as pisiform. 2.No definite acute fracture of the wrist or forearm. Electronically Signed: Jermaine Ramirez MD  3/23/2024 4:17 PM EDT  Workstation ID: [id number]                                            Medical Decision Making  Presentation is consistent with cellulitis.  Patient is not immunocompromised.  I think outpatient treatment attempt is appropriate.  He has received 1 dose of IV antibiotics here in the emergency department and will take Keflex and Bactrim at home.  He will monitor his symptoms closely and have a low threshold to return to the emergency department if symptoms persist, worsen, or other concerns arise.  Patient is comfortable with this plan.    Problems Addressed:  Cellulitis of left arm: complicated acute illness or injury    Amount and/or Complexity of Data Reviewed  Labs: ordered. Decision-making details documented in ED Course.  Radiology: independent interpretation performed. Decision-making details documented in ED Course.    Risk  OTC drugs.  Prescription drug management.        Final diagnoses:   Cellulitis of left arm        ED Disposition  ED Disposition       ED Disposition   Discharge    Condition   Stable    Comment   --           DISCHARGE    Patient discharged in stable condition.    Reviewed implications of results, diagnosis, meds, responsibility to follow up, warning signs and symptoms of possible worsening, potential complications and reasons to return to ER.    Patient/Family voiced understanding of above instructions.    Discussed plan for discharge, as there is no emergent indication for admission.  Pt/family is agreeable and understands need for follow up and possible repeat testing.  Pt/family is aware that discharge does not mean that nothing is wrong but that it indicates no emergency is currently present that requires admission and they must continue care with follow-up as given below or with a physician of their choice.     FOLLOW-UP  Daniela Pereyra MD  [address] [id number] [phone]    Schedule an appointment as soon as possible for a visit            Medication List        New Prescriptions      cephalexin 500 MG capsule  Commonly known as: KEFLEX  Take 1 capsule by mouth 4 (Four) Times a Day for 10 days.     saccharomyces boulardii 250 MG capsule  Commonly known as: FLORASTOR  Take 1 capsule by mouth 2 (Two) Times a Day for 10 days.     sulfamethoxazole-trimethoprim 800-160 MG per tablet  Commonly known as: BACTRIM DS,SEPTRA DS  Take 1 tablet by mouth 2 (Two) Times a Day for 10 days.               Where to Get Your Medications        These medications were sent to [facility] [id number] - [place], [place] - [address] AT [facility] [address] - [phone] PH - [phone] FX  [address], [facility] [address]      Phone: [phone]   cephalexin 500 MG capsule  saccharomyces boulardii 250 MG capsule  sulfamethoxazole-trimethoprim 800-160 MG per tablet            Charlie Ochoa DO  03/24/24 6209

## 2024-03-28 LAB
BACTERIA SPEC AEROBE CULT: NORMAL
BACTERIA SPEC AEROBE CULT: NORMAL

## 2024-04-29 DIAGNOSIS — N52.8 OTHER MALE ERECTILE DYSFUNCTION: ICD-10-CM

## 2024-04-29 RX ORDER — TADALAFIL 20 MG/1
TABLET ORAL
Qty: 16 TABLET | Refills: 3 | OUTPATIENT
Start: 2024-04-29

## 2024-04-29 RX ORDER — TADALAFIL 20 MG/1
20 TABLET ORAL DAILY PRN
Qty: 30 TABLET | OUTPATIENT
Start: 2024-04-29

## 2024-04-29 NOTE — TELEPHONE ENCOUNTER
Caller: Sunny Drummond    Relationship: Self    Best call back number: [phone]     Requested Prescriptions:   Requested Prescriptions     Pending Prescriptions Disp Refills    tadalafil (CIALIS) 20 MG tablet 16 tablet 3        Pharmacy where request should be sent: [facility] [id number] [facility] [address] AT [facility] [address] - [phone]  - [phone] FX     Last office visit with prescribing clinician: 2/23/2024   Last telemedicine visit with prescribing clinician: Visit date not found   Next office visit with prescribing clinician: 8/5/2024     Additional details provided by patient: PATIENT IS OUT.     Does the patient have less than a 3 day supply:  [x] Yes  [] No    Would you like a call back once the refill request has been completed: [] Yes [x] No    If the office needs to give you a call back, can they leave a voicemail: [] Yes [x] No    Cadance Dunaway, RegSched Rep   04/29/24 16:57 EDT

## 2024-08-07 ENCOUNTER — OFFICE VISIT (OUTPATIENT)
Dept: INTERNAL MEDICINE | Facility: CLINIC | Age: 39
End: 2024-08-07
Payer: COMMERCIAL

## 2024-08-07 VITALS
RESPIRATION RATE: 16 BRPM | BODY MASS INDEX: 27.74 KG/M2 | HEART RATE: 83 BPM | HEIGHT: 68 IN | DIASTOLIC BLOOD PRESSURE: 62 MMHG | OXYGEN SATURATION: 98 % | WEIGHT: 183 LBS | SYSTOLIC BLOOD PRESSURE: 114 MMHG

## 2024-08-07 DIAGNOSIS — N52.8 OTHER MALE ERECTILE DYSFUNCTION: ICD-10-CM

## 2024-08-07 DIAGNOSIS — L21.9 SEBORRHEIC DERMATITIS: ICD-10-CM

## 2024-08-07 DIAGNOSIS — E29.0 MALE HYPERTESTOSTERONEMIA: ICD-10-CM

## 2024-08-07 DIAGNOSIS — E22.1 HYPERPROLACTINEMIA: Primary | ICD-10-CM

## 2024-08-07 PROCEDURE — 99214 OFFICE O/P EST MOD 30 MIN: CPT | Performed by: FAMILY MEDICINE

## 2024-08-07 PROCEDURE — 1126F AMNT PAIN NOTED NONE PRSNT: CPT | Performed by: FAMILY MEDICINE

## 2024-08-07 PROCEDURE — 1159F MED LIST DOCD IN RCRD: CPT | Performed by: FAMILY MEDICINE

## 2024-08-07 PROCEDURE — 1160F RVW MEDS BY RX/DR IN RCRD: CPT | Performed by: FAMILY MEDICINE

## 2024-08-07 RX ORDER — KETOCONAZOLE 20 MG/ML
SHAMPOO TOPICAL 2 TIMES WEEKLY
Qty: 120 ML | Refills: 1 | Status: SHIPPED | OUTPATIENT
Start: 2024-08-08

## 2024-08-07 RX ORDER — TADALAFIL 20 MG/1
20 TABLET ORAL DAILY PRN
Qty: 16 TABLET | Refills: 3 | Status: SHIPPED | OUTPATIENT
Start: 2024-08-07

## 2024-08-07 RX ORDER — KETOCONAZOLE 20 MG/G
CREAM TOPICAL
Qty: 30 G | Refills: 1 | Status: CANCELLED | OUTPATIENT
Start: 2024-08-07

## 2024-08-07 NOTE — PATIENT INSTRUCTIONS
Diagnosis Discussed   Continue to monitor   Plenty of fluids  Labs ordered will notify of results when available   Medications as directed- refills today   If symptoms worsen or persist please seek further evaluation

## 2024-08-07 NOTE — PROGRESS NOTES
Office Note     Name: Sunny Drummond    : 1985     MRN: [id number]     Chief Complaint  Fatigue (Follow up, Pt would like to discuss lab orders, testosterone) and Seborrheic Dermatitis    Subjective     History of Present Illness:  Sunny Drummond is a 39 y.o. male who presents today for fatigue- needs labs rechecked   Hyperprolactinemia   Denies discharge or drainage from nipples   Denies soreness     Doing well overall -some aches and feeling more fatigued   Concerns for seborrheic dermatitis   Hx of elevated liver enzymes   Sleeping has improved- chronically tired     Has been taking armidex as needed   Quit testosterone- 6 weeks.   Was a once a week       Review of Systems:   Review of Systems   Constitutional:  Positive for fatigue. Negative for chills and fever.   Respiratory:  Negative for cough, chest tightness, shortness of breath and wheezing.    Cardiovascular:  Negative for chest pain, palpitations and leg swelling.   Gastrointestinal:  Negative for abdominal pain, blood in stool, constipation, diarrhea, nausea and vomiting.   Genitourinary:  Negative for breast discharge and dysuria.   Musculoskeletal:  Negative for gait problem.   Skin:  Positive for rash.   Neurological:  Negative for light-headedness and headache.   Psychiatric/Behavioral:  Negative for dysphoric mood and depressed mood.        Past Medical History:   Past Medical History:   Diagnosis Date    Chronic back pain     Chronic fatigue 3/2/2021    Hyperprolactinemia 2020    Insomnia     Male hypertestosteronemia        Past Surgical History:   Past Surgical History:   Procedure Laterality Date    FINGER SURGERY Right        Immunizations:   Immunization History   Administered Date(s) Administered    Tdap 2023        Medications:     Current Outpatient Medications:     cabergoline (DOSTINEX) 0.5 MG tablet, Take 1 tablet by mouth 2 (Two) Times a Week., Disp: 24 tablet, Rfl: 1    finasteride (PROPECIA) 1 MG  "tablet, TAKE ONE TABLET BY MOUTH DAILY, Disp: 90 tablet, Rfl: 3    ibuprofen (ADVIL,MOTRIN) 800 MG tablet, , Disp: , Rfl:     ketoconazole (NIZORAL) 2 % cream, Apply to affected areas in thins layer BID as needed, Disp: 30 g, Rfl: 1    [START ON 2024] ketoconazole (NIZORAL) 2 % shampoo, Apply  topically to the appropriate area as directed 2 (Two) Times a Week. Let sit 5 minutes on scalp, then rinse., Disp: 120 mL, Rfl: 1    sildenafil (VIAGRA) 100 MG tablet, TAKE ONE TABLET BY MOUTH DAILY AS NEEDED FOR ERECTILE DYSFUNCTION DO NOT EXCEED MORE THAN 4 A WEEK. ALERNATE BETWEEN TADALAFIL AND SILDENAFIL, DO NOT TAKE BOTH AT THE SAME TIME, Disp: 16 tablet, Rfl: 3    tadalafil (CIALIS) 20 MG tablet, Take 1 tablet by mouth Daily As Needed for Erectile Dysfunction., Disp: 16 tablet, Rfl: 3    traZODone (DESYREL) 100 MG tablet, Take 1 tablet by mouth Every Night., Disp: 90 tablet, Rfl: 1    Allergies:   No Known Allergies    Family History:   Family History   Problem Relation Age of Onset    Diabetes Mother     Hypertension Mother     Cancer Father     Alcohol abuse Father     Pancreatic cancer Father     Heart disease Maternal Grandfather     Alcohol abuse Maternal Grandfather        Social History:   Social History     Socioeconomic History    Marital status:    Tobacco Use    Smoking status: Former     Current packs/day: 0.00     Average packs/day: 0.3 packs/day for 10.0 years (2.5 ttl pk-yrs)     Types: Cigarettes     Start date: 3/2/2008     Quit date: 3/2/2018     Years since quittin.4     Passive exposure: Past    Smokeless tobacco: Former     Types: Snuff    Tobacco comments:     just when drinking   Vaping Use    Vaping status: Former    Substances: Nicotine   Substance and Sexual Activity    Alcohol use: Yes     Comment: occasionally    Drug use: No    Sexual activity: Defer         Objective     Vital Signs  /62   Pulse 83   Resp 16   Ht 172.7 cm (68\")   Wt 83 kg (183 lb)   SpO2 98%   BMI " "27.83 kg/m²   Estimated body mass index is 27.83 kg/m² as calculated from the following:    Height as of this encounter: 172.7 cm (68\").    Weight as of this encounter: 83 kg (183 lb).            Physical Exam  Vitals and nursing note reviewed.   Constitutional:       General: He is not in acute distress.     Appearance: Normal appearance.   HENT:      Head: Normocephalic and atraumatic.      Right Ear: Tympanic membrane normal.      Left Ear: Tympanic membrane normal.      Nose: Nose normal.      Mouth/Throat:      Mouth: Mucous membranes are moist.   Eyes:      Extraocular Movements: Extraocular movements intact.      Conjunctiva/sclera: Conjunctivae normal.      Pupils: Pupils are equal, round, and reactive to light.   Cardiovascular:      Rate and Rhythm: Normal rate and regular rhythm.      Heart sounds: Normal heart sounds.   Pulmonary:      Effort: Pulmonary effort is normal. No respiratory distress.      Breath sounds: Normal breath sounds.   Abdominal:      General: Bowel sounds are normal.      Palpations: Abdomen is soft.   Musculoskeletal:         General: Normal range of motion.      Cervical back: Normal range of motion.      Comments: Moving all extremities    Skin:     General: Skin is warm and dry.   Neurological:      General: No focal deficit present.      Mental Status: He is alert and oriented to person, place, and time.   Psychiatric:         Mood and Affect: Mood normal.         Behavior: Behavior normal.         Thought Content: Thought content normal.         Judgment: Judgment normal.          Procedures     Assessment and Plan   Diagnosis Discussed   Continue to monitor   Plenty of fluids  Labs ordered will notify of results when available   Medications as directed- refills today   If symptoms worsen or persist please seek further evaluation    1. Hyperprolactinemia  - Prolactin; Future  - Estradiol; Future  - Comprehensive metabolic panel; Future  - CBC (No Diff); Future    2. Male " hypertestosteronemia  - Testosterone, Free, Total; Future  - Estradiol; Future  - Comprehensive metabolic panel; Future  - CBC (No Diff); Future    3. Seborrheic dermatitis  - ketoconazole (NIZORAL) 2 % shampoo; Apply  topically to the appropriate area as directed 2 (Two) Times a Week. Let sit 5 minutes on scalp, then rinse.  Dispense: 120 mL; Refill: 1    4. Other male erectile dysfunction  - tadalafil (CIALIS) 20 MG tablet; Take 1 tablet by mouth Daily As Needed for Erectile Dysfunction.  Dispense: 16 tablet; Refill: 3       Follow Up  Return in about 6 months (around 2/7/2025), or if symptoms worsen or fail to improve, for Annual, fasting labs.    Daniela Pereyra MD  [facility]  [address] [place] [id number] [phone]

## 2024-08-09 ENCOUNTER — LAB (OUTPATIENT)
Dept: LAB | Facility: HOSPITAL | Age: 39
End: 2024-08-09
Payer: COMMERCIAL

## 2024-08-09 DIAGNOSIS — E29.0 MALE HYPERTESTOSTERONEMIA: ICD-10-CM

## 2024-08-09 DIAGNOSIS — E22.1 HYPERPROLACTINEMIA: ICD-10-CM

## 2024-08-09 LAB
ALBUMIN SERPL-MCNC: 4.2 G/DL (ref 3.5–5.2)
ALBUMIN/GLOB SERPL: 1.6 G/DL
ALP SERPL-CCNC: 39 U/L (ref 39–117)
ALT SERPL W P-5'-P-CCNC: 61 U/L (ref 1–41)
ANION GAP SERPL CALCULATED.3IONS-SCNC: 5.9 MMOL/L (ref 5–15)
AST SERPL-CCNC: 85 U/L (ref 1–40)
BILIRUB SERPL-MCNC: 0.5 MG/DL (ref 0–1.2)
BUN SERPL-MCNC: 19 MG/DL (ref 6–20)
BUN/CREAT SERPL: 17.8 (ref 7–25)
CALCIUM SPEC-SCNC: 9.2 MG/DL (ref 8.6–10.5)
CHLORIDE SERPL-SCNC: 107 MMOL/L (ref 98–107)
CO2 SERPL-SCNC: 26.1 MMOL/L (ref 22–29)
CREAT SERPL-MCNC: 1.07 MG/DL (ref 0.76–1.27)
DEPRECATED RDW RBC AUTO: 53.6 FL (ref 37–54)
EGFRCR SERPLBLD CKD-EPI 2021: 90.5 ML/MIN/1.73
ERYTHROCYTE [DISTWIDTH] IN BLOOD BY AUTOMATED COUNT: 15.8 % (ref 12.3–15.4)
ESTRADIOL SERPL HS-MCNC: <5 PG/ML
GLOBULIN UR ELPH-MCNC: 2.6 GM/DL
GLUCOSE SERPL-MCNC: 106 MG/DL (ref 65–99)
HCT VFR BLD AUTO: 43.3 % (ref 37.5–51)
HGB BLD-MCNC: 14.2 G/DL (ref 13–17.7)
MCH RBC QN AUTO: 30.4 PG (ref 26.6–33)
MCHC RBC AUTO-ENTMCNC: 32.8 G/DL (ref 31.5–35.7)
MCV RBC AUTO: 92.7 FL (ref 79–97)
PLATELET # BLD AUTO: 383 10*3/MM3 (ref 140–450)
PMV BLD AUTO: 10.9 FL (ref 6–12)
POTASSIUM SERPL-SCNC: 4.4 MMOL/L (ref 3.5–5.2)
PROLACTIN SERPL-MCNC: 3.3 NG/ML (ref 4.04–15.2)
PROT SERPL-MCNC: 6.8 G/DL (ref 6–8.5)
RBC # BLD AUTO: 4.67 10*6/MM3 (ref 4.14–5.8)
SODIUM SERPL-SCNC: 139 MMOL/L (ref 136–145)
WBC NRBC COR # BLD AUTO: 6.72 10*3/MM3 (ref 3.4–10.8)

## 2024-08-09 PROCEDURE — 84146 ASSAY OF PROLACTIN: CPT

## 2024-08-09 PROCEDURE — 82670 ASSAY OF TOTAL ESTRADIOL: CPT

## 2024-08-09 PROCEDURE — 84402 ASSAY OF FREE TESTOSTERONE: CPT

## 2024-08-09 PROCEDURE — 85027 COMPLETE CBC AUTOMATED: CPT

## 2024-08-09 PROCEDURE — 80053 COMPREHEN METABOLIC PANEL: CPT

## 2024-08-09 PROCEDURE — 84403 ASSAY OF TOTAL TESTOSTERONE: CPT

## 2024-08-13 LAB
TESTOST FREE SERPL-MCNC: 2 PG/ML (ref 8.7–25.1)
TESTOST SERPL-MCNC: 35 NG/DL (ref 264–916)

## 2024-08-16 ENCOUNTER — OFFICE VISIT (OUTPATIENT)
Dept: INTERNAL MEDICINE | Facility: CLINIC | Age: 39
End: 2024-08-16
Payer: COMMERCIAL

## 2024-08-16 ENCOUNTER — LAB (OUTPATIENT)
Dept: LAB | Facility: HOSPITAL | Age: 39
End: 2024-08-16
Payer: COMMERCIAL

## 2024-08-16 ENCOUNTER — TELEPHONE (OUTPATIENT)
Dept: INTERNAL MEDICINE | Facility: CLINIC | Age: 39
End: 2024-08-16

## 2024-08-16 VITALS
OXYGEN SATURATION: 98 % | HEIGHT: 68 IN | SYSTOLIC BLOOD PRESSURE: 116 MMHG | HEART RATE: 83 BPM | RESPIRATION RATE: 18 BRPM | BODY MASS INDEX: 27.74 KG/M2 | DIASTOLIC BLOOD PRESSURE: 68 MMHG | WEIGHT: 183 LBS

## 2024-08-16 DIAGNOSIS — N62 GYNECOMASTIA: ICD-10-CM

## 2024-08-16 DIAGNOSIS — E29.0 MALE HYPERTESTOSTERONEMIA: ICD-10-CM

## 2024-08-16 DIAGNOSIS — E22.1 HYPERPROLACTINEMIA: ICD-10-CM

## 2024-08-16 DIAGNOSIS — R79.89 LOW TESTOSTERONE IN MALE: Primary | ICD-10-CM

## 2024-08-16 DIAGNOSIS — R79.89 LOW TESTOSTERONE IN MALE: ICD-10-CM

## 2024-08-16 DIAGNOSIS — R53.82 CHRONIC FATIGUE: ICD-10-CM

## 2024-08-16 PROCEDURE — 84153 ASSAY OF PSA TOTAL: CPT

## 2024-08-16 NOTE — TELEPHONE ENCOUNTER
Caller: Crystal Drummond    Relationship: Self    Best call back number: [phone]     What is the best time to reach you: ANYTIME    Who are you requesting to speak with (clinical staff, provider,  specific staff member): CLINICAL    Do you know the name of the person who called: CRYSTAL    What was the call regarding: PATIENT SAW HIS RESULTS ONLINE AND SATS THEY DON'T LOOK GOOD. HE ISN'T FEELING WELL EITHER. HE'S FEELING LATHARGIC AND LOW ENERGY. PLEASE FOLLOW UP ASAP.    Is it okay if the provider responds through MyChart: NO

## 2024-08-16 NOTE — PROGRESS NOTES
Office Note     Name: Sunny Drummond    : 1985     MRN: [id number]     Chief Complaint  Hyperprolactinemia (Pt wanting to discuss recent lab results) and Fatigue    Subjective     History of Present Illness:  Sunny Drummond is a 39 y.o. male who presents today for has cycled on and off of testosterone x 4-5 years. Has been getting it from- competitive     Testosterone weekly- 250mg weekly   Dostinex- cabergoline 0.5mg twice a week   Hx of elevated prolactin   MRI Brain - no significant finding- no pituritary lesion/tumor found     Has been cycling testosterone since   Most recent has been off testosterone x 2 months  No formal evaluation  Currently at very low testosterone level   Will check PSA today     Chronic Fatigue, slight weakness, mild loss of muscle, body aches   Denies difficulty urinating  Denies urinary frequency      Review of Systems:   Review of Systems   Constitutional:  Positive for activity change and fatigue. Negative for chills and fever.   Respiratory:  Negative for cough, chest tightness, shortness of breath and wheezing.    Cardiovascular:  Negative for chest pain, palpitations and leg swelling.   Gastrointestinal:  Negative for abdominal pain, blood in stool, constipation, nausea and vomiting.   Genitourinary:  Negative for dysuria.   Musculoskeletal:  Positive for myalgias. Negative for gait problem.   Skin:  Negative for rash.   Neurological:  Positive for weakness.   Psychiatric/Behavioral:  Negative for dysphoric mood.        Past Medical History:   Past Medical History:   Diagnosis Date    Chronic back pain     Chronic fatigue 3/2/2021    Hyperprolactinemia 2020    Insomnia     Male hypertestosteronemia        Past Surgical History:   Past Surgical History:   Procedure Laterality Date    FINGER SURGERY Right        Immunizations:   Immunization History   Administered Date(s) Administered    Tdap 2023        Medications:     Current Outpatient  Medications:     cabergoline (DOSTINEX) 0.5 MG tablet, Take 1 tablet by mouth 2 (Two) Times a Week., Disp: 24 tablet, Rfl: 1    finasteride (PROPECIA) 1 MG tablet, TAKE ONE TABLET BY MOUTH DAILY, Disp: 90 tablet, Rfl: 3    ibuprofen (ADVIL,MOTRIN) 800 MG tablet, , Disp: , Rfl:     ketoconazole (NIZORAL) 2 % cream, Apply to affected areas in thins layer BID as needed, Disp: 30 g, Rfl: 1    ketoconazole (NIZORAL) 2 % shampoo, Apply  topically to the appropriate area as directed 2 (Two) Times a Week. Let sit 5 minutes on scalp, then rinse., Disp: 120 mL, Rfl: 1    sildenafil (VIAGRA) 100 MG tablet, TAKE ONE TABLET BY MOUTH DAILY AS NEEDED FOR ERECTILE DYSFUNCTION DO NOT EXCEED MORE THAN 4 A WEEK. ALERNATE BETWEEN TADALAFIL AND SILDENAFIL, DO NOT TAKE BOTH AT THE SAME TIME, Disp: 16 tablet, Rfl: 3    tadalafil (CIALIS) 20 MG tablet, Take 1 tablet by mouth Daily As Needed for Erectile Dysfunction., Disp: 16 tablet, Rfl: 3    traZODone (DESYREL) 100 MG tablet, Take 1 tablet by mouth Every Night., Disp: 90 tablet, Rfl: 1    Allergies:   No Known Allergies    Family History:   Family History   Problem Relation Age of Onset    Diabetes Mother     Hypertension Mother     Cancer Father     Alcohol abuse Father     Pancreatic cancer Father     Heart disease Maternal Grandfather     Alcohol abuse Maternal Grandfather        Social History:   Social History     Socioeconomic History    Marital status:    Tobacco Use    Smoking status: Former     Current packs/day: 0.00     Average packs/day: 0.3 packs/day for 10.0 years (2.5 ttl pk-yrs)     Types: Cigarettes     Start date: 3/2/2008     Quit date: 3/2/2018     Years since quittin.4     Passive exposure: Past    Smokeless tobacco: Former     Types: Snuff    Tobacco comments:     just when drinking   Vaping Use    Vaping status: Former    Substances: Nicotine   Substance and Sexual Activity    Alcohol use: Yes     Comment: occasionally    Drug use: No    Sexual activity:  "Defer         Objective     Vital Signs  /68   Pulse 83   Resp 18   Ht 172.7 cm (68\")   Wt 83 kg (183 lb)   SpO2 98%   BMI 27.83 kg/m²   Estimated body mass index is 27.83 kg/m² as calculated from the following:    Height as of this encounter: 172.7 cm (68\").    Weight as of this encounter: 83 kg (183 lb).            Physical Exam  Vitals and nursing note reviewed.   Constitutional:       General: He is not in acute distress.     Appearance: Normal appearance.   HENT:      Head: Normocephalic and atraumatic.   Cardiovascular:      Rate and Rhythm: Normal rate and regular rhythm.      Heart sounds: Normal heart sounds.   Pulmonary:      Effort: Pulmonary effort is normal. No respiratory distress.      Breath sounds: Normal breath sounds.   Abdominal:      Palpations: Abdomen is soft.   Musculoskeletal:         General: Normal range of motion.      Comments: Moving all extremities    Skin:     General: Skin is warm and dry.   Neurological:      General: No focal deficit present.      Mental Status: He is alert and oriented to person, place, and time.          Procedures     Assessment and Plan   Diagnosis Discussed   Continue to monitor   Plenty of fluids, monitor diet and exercise   PSA today   Take medications as instructed  Follow up with Urology as directed - further workup recommended- extended hx of cycling high doses of testosterone. Needs further workup and guidance.   Follow up as directed   If symptoms worsen or persist please seek further evaluation     1. Low testosterone in male  - Ambulatory Referral to Urology  - PSA DIAGNOSTIC; Future    2. Chronic fatigue  - Ambulatory Referral to Urology  - PSA DIAGNOSTIC; Future    3. Hyperprolactinemia  - Ambulatory Referral to Urology  - PSA DIAGNOSTIC; Future    4. Male hypertestosteronemia  - Ambulatory Referral to Urology  - PSA DIAGNOSTIC; Future    5. Gynecomastia  - Ambulatory Referral to Urology  - PSA DIAGNOSTIC; Future       Follow Up  Return " if symptoms worsen or fail to improve.    [facility] INTER[facility]ICINE  [address] [place] [id number] [phone]

## 2024-08-16 NOTE — PATIENT INSTRUCTIONS
Diagnosis Discussed   Continue to monitor   Plenty of fluids, monitor diet and exercise   PSA today   Take medications as instructed  Follow up with Urology as directed   Follow up as directed   If symptoms worsen or persist please seek further evaluation

## 2024-08-17 LAB — PSA SERPL-MCNC: 0.48 NG/ML (ref 0–4)

## 2024-08-20 ENCOUNTER — TELEPHONE (OUTPATIENT)
Dept: INTERNAL MEDICINE | Facility: CLINIC | Age: 39
End: 2024-08-20

## 2024-08-29 ENCOUNTER — TELEPHONE (OUTPATIENT)
Dept: INTERNAL MEDICINE | Facility: CLINIC | Age: 39
End: 2024-08-29

## 2024-08-29 DIAGNOSIS — E29.0 MALE HYPERTESTOSTERONEMIA: Primary | ICD-10-CM

## 2024-09-03 ENCOUNTER — LAB (OUTPATIENT)
Dept: LAB | Facility: HOSPITAL | Age: 39
End: 2024-09-03
Payer: COMMERCIAL

## 2024-09-03 DIAGNOSIS — E29.0 MALE HYPERTESTOSTERONEMIA: ICD-10-CM

## 2024-09-03 PROCEDURE — 84403 ASSAY OF TOTAL TESTOSTERONE: CPT

## 2024-09-03 PROCEDURE — 84402 ASSAY OF FREE TESTOSTERONE: CPT

## 2024-09-06 LAB
TESTOST FREE SERPL-MCNC: 1.1 PG/ML (ref 8.7–25.1)
TESTOST SERPL-MCNC: 36 NG/DL (ref 264–916)

## 2024-12-03 ENCOUNTER — OFFICE VISIT (OUTPATIENT)
Dept: UROLOGY | Facility: CLINIC | Age: 39
End: 2024-12-03
Payer: COMMERCIAL

## 2024-12-03 VITALS
SYSTOLIC BLOOD PRESSURE: 157 MMHG | DIASTOLIC BLOOD PRESSURE: 77 MMHG | HEIGHT: 68 IN | HEART RATE: 106 BPM | OXYGEN SATURATION: 96 % | BODY MASS INDEX: 28.04 KG/M2 | WEIGHT: 185 LBS

## 2024-12-03 DIAGNOSIS — R79.89 LOW TESTOSTERONE: Primary | ICD-10-CM

## 2024-12-03 DIAGNOSIS — N52.9 ERECTILE DYSFUNCTION, UNSPECIFIED ERECTILE DYSFUNCTION TYPE: ICD-10-CM

## 2024-12-03 NOTE — PROGRESS NOTES
Low Testosterone Office Visit      Patient Name: Sunny Drummond  : 1985   MRN: [id number]     Chief Complaint: Low Testosterone.    Chief Complaint   Patient presents with    Hypogonadism   .     Referring Provider: Daniela Pereyra MD    History of Present Illness: Mr. Drummond is a 39 y.o. male with history of Erectile dysfunction, Hyperprolactinemia and Low Testosterone.     He reports initially feeling fatigued in  during Covid and was referred to Endocrinology but unable to get in at that time. He began self medicating with Testosterone injections in  with dose of 250mg once weekly. He reports improvement in fatigue during that time. He then stopped the injection for a period of time. He then restarted self medicating  with Testosterone injections of 125mg once weekly. He is currently taking Cabergoline once weekly for elevated prolactin.     Has previously tried Clomid 25mg once daily x3 weeks that was given to him by a friend.     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 He has 4 children. He has been prescribed Viagra and Cialis and alternates for erectile dysfunction.    Testosterone      24; Hematocrit 44.4.   24; PSA 0.481.   24; <5.0.   24; Prolactin 3.3  Subjective      Review of System: Review of Systems   Constitutional:  Positive for fatigue.   All other systems reviewed and are negative.     I have reviewed the ROS documented by my clinical staff, I have updated appropriately and I agree. YEE Bauman    Past Medical History:  Past Medical History:   Diagnosis Date    Chronic back pain     Chronic fatigue 3/2/2021    Hyperprolactinemia 2020    Insomnia     Male hypertestosteronemia        Past Surgical History:  Past Surgical History:   Procedure Laterality Date    FINGER SURGERY Right        Medications:    Current Outpatient Medications:     cabergoline (DOSTINEX) 0.5 MG tablet, Take 1 tablet by mouth 2  (Two) Times a Week., Disp: 24 tablet, Rfl: 1    finasteride (PROPECIA) 1 MG tablet, TAKE ONE TABLET BY MOUTH DAILY, Disp: 90 tablet, Rfl: 3    ibuprofen (ADVIL,MOTRIN) 800 MG tablet, , Disp: , Rfl:     ketoconazole (NIZORAL) 2 % cream, Apply to affected areas in thins layer BID as needed, Disp: 30 g, Rfl: 1    ketoconazole (NIZORAL) 2 % shampoo, Apply  topically to the appropriate area as directed 2 (Two) Times a Week. Let sit 5 minutes on scalp, then rinse., Disp: 120 mL, Rfl: 1    sildenafil (VIAGRA) 100 MG tablet, TAKE ONE TABLET BY MOUTH DAILY AS NEEDED FOR ERECTILE DYSFUNCTION DO NOT EXCEED MORE THAN 4 A WEEK. ALERNATE BETWEEN TADALAFIL AND SILDENAFIL, DO NOT TAKE BOTH AT THE SAME TIME, Disp: 16 tablet, Rfl: 3    tadalafil (CIALIS) 20 MG tablet, Take 1 tablet by mouth Daily As Needed for Erectile Dysfunction., Disp: 16 tablet, Rfl: 3    traZODone (DESYREL) 100 MG tablet, Take 1 tablet by mouth Every Night., Disp: 90 tablet, Rfl: 1    Allergies:  No Known Allergies    Social History:  Social History     Socioeconomic History    Marital status:    Tobacco Use    Smoking status: Former     Current packs/day: 0.00     Average packs/day: 0.3 packs/day for 10.0 years (2.5 ttl pk-yrs)     Types: Cigarettes     Start date: 3/2/2008     Quit date: 3/2/2018     Years since quittin.7     Passive exposure: Past    Smokeless tobacco: Former     Types: Snuff    Tobacco comments:     just when drinking   Vaping Use    Vaping status: Former    Substances: Nicotine   Substance and Sexual Activity    Alcohol use: Yes     Comment: occasionally    Drug use: No    Sexual activity: Defer       Family History:  Family History   Problem Relation Age of Onset    Diabetes Mother     Hypertension Mother     Cancer Father     Alcohol abuse Father     Pancreatic cancer Father     Heart disease Maternal Grandfather     Alcohol abuse Maternal Grandfather        IPSS Questionnaire (AUA-7):  Over the past month…    1)  Incomplete  "Emptying:       How often have you had a sensation of not emptying you had the sensation of not emptying your bladder completely after you finished urinating?  1 - Less than 1 time in 5   2)  Frequency:       How often have you had the urinate again less than two hours after you finished urinating?  0 - Not at all   3)  Intermittency:       How often have you found you stopped and started again several times when you urinated?   0 - Not at all   4) Urgency:      How often have you found it difficult to postpone urination?  0 - Not at all   5) Weak Stream:      How often have you had a weak urinary stream?  1 - Less than 1 time in 5   6) Straining:       How often have you had to push or strain to begin urination?  0 - Not at all   7) Nocturia:      How many times did you most typically get up to urinate from the time you went to bed at night until the time you got up in the morning?  1 - 1 time   Total Score:  3   The International Prostate Symptom Score (IPSS) is used to screen, diagnose, track symptoms of benign prostatic hyperplasia (BPH).   0-7 (Mild Symptoms) 8-19 (Moderate) 20-35 (Severe)   Quality of Life (QoL):  If you were to spend the rest of your life with your urinary condition just the way it is now, how would you feel about that? 1-Pleased   Urine Leakage (Incontinence) 0-No Leakage        Objective     Physical Exam:   Vital Signs:   Vitals:    12/03/24 1410   BP: 157/77   Pulse: 106   SpO2: 96%   Weight: 83.9 kg (185 lb)   Height: 172.7 cm (68\")     Body mass index is 28.13 kg/m².     Physical Exam  Vitals and nursing note reviewed.   Constitutional:       Appearance: Normal appearance.   HENT:      Head: Normocephalic and atraumatic.      Nose: Nose normal.      Mouth/Throat:      Mouth: Mucous membranes are moist.   Eyes:      Pupils: Pupils are equal, round, and reactive to light.   Pulmonary:      Effort: Pulmonary effort is normal.   Abdominal:      General: Abdomen is flat.      Palpations: " Abdomen is soft.   Musculoskeletal:         General: Normal range of motion.      Cervical back: Normal range of motion.   Skin:     General: Skin is warm and dry.      Capillary Refill: Capillary refill takes less than 2 seconds.   Neurological:      General: No focal deficit present.      Mental Status: He is alert.   Psychiatric:         Mood and Affect: Mood normal.       Labs:   Lab Results   Component Value Date    TESTOSTERONE >1,500.00 (H) 06/02/2021       Lab Results   Component Value Date    PSA 0.481 08/16/2024       Lab Results   Component Value Date    WBC 6.72 08/09/2024    HGB 14.2 08/09/2024    HCT 43.3 08/09/2024    MCV 92.7 08/09/2024     08/09/2024       Images:   No Images in the past 120 days found..    Measures:   Tobacco:   Sunny Drummond  reports that he quit smoking about 6 years ago. His smoking use included cigarettes. He started smoking about 16 years ago. He has a 2.5 pack-year smoking history. He has been exposed to tobacco smoke. He has quit using smokeless tobacco.  His smokeless tobacco use included snuff.            Urine Incontinence: Patient reports that he is not currently experiencing any symptoms of urinary incontinence.    Assessment / Plan      Assessment/Plan:   Mr. Drummond is a 39 y.o. male who presented today with low testosterone. I do not have updated labs since he began self medicating with Testosterone Injections in October. We will obtain updated Total Testosterone and CBC. Once we have lab results we can discuss appropriate prescription for Testosterone Cypionate and appropriate dose based on lab values. He is agreeable with plan of care.     We also discussed to not use Viagra and Cialis together. We discussed risk of priapism and risk of hypotension.      Diagnoses and all orders for this visit:    1. Low testosterone (Primary)  -     CBC (No Diff); Future  -     Testosterone; Future    2. Erectile dysfunction, unspecified erectile dysfunction type          Patient Education:   We discussed today the role testosterone plays for a male patient. I have indicated that low testosterone can be associated with metabolic syndrome, erectile dysfunction, coronary artery disease, diabetes, depression, osteoporosis, fatigue, low sex drive, poor sleep, high cholesterol, increased abdominal fat, muscle loss, irritability, hot flashes, and inability to concentrate.     Treatment options were discussed including SERMs, aromatase inhibitors, topical gel or patch, testosterone injections every 2-3 weeks and long-acting injections every 10 weeks.    I explained the risks, benefits, and alternatives to testosterone therapies. I informed him of the potential SEs of chest and leg swelling, increased acne, change in mood, polycythemia, and worsening of undiagnosed prostate cancer.     Follow Up:   Return in about 2 weeks (around 12/17/2024) for Labs prior .    I spent approximately 20 minutes providing clinical care for this patient; including review of patient's chart and provider documentation, face to face time spent with patient in examination room (obtaining history, performing physical exam, discussing diagnosis and management options), placing orders, and completing patient documentation.     YEE Baxter  [facility]

## 2024-12-12 DIAGNOSIS — N52.8 OTHER MALE ERECTILE DYSFUNCTION: ICD-10-CM

## 2024-12-12 DIAGNOSIS — L64.9 MALE PATTERN BALDNESS: ICD-10-CM

## 2024-12-12 RX ORDER — SILDENAFIL 100 MG/1
TABLET, FILM COATED ORAL
Qty: 16 TABLET | Refills: 3 | OUTPATIENT
Start: 2024-12-12

## 2024-12-12 RX ORDER — FINASTERIDE 1 MG/1
1 TABLET, FILM COATED ORAL DAILY
Qty: 90 TABLET | Refills: 3 | OUTPATIENT
Start: 2024-12-12

## 2025-01-17 DIAGNOSIS — N52.8 OTHER MALE ERECTILE DYSFUNCTION: ICD-10-CM

## 2025-01-17 NOTE — TELEPHONE ENCOUNTER
Last appointment: 8/16/2024  Next appointment: 2/27/2025     Last Refill: 10/3/2023 quantity of 16 with 3 refills. Last filled by Kassie Hilliard

## 2025-01-19 RX ORDER — SILDENAFIL 100 MG/1
TABLET, FILM COATED ORAL
Qty: 16 TABLET | Refills: 0 | OUTPATIENT
Start: 2025-01-19

## 2025-01-21 ENCOUNTER — LAB (OUTPATIENT)
Dept: LAB | Facility: HOSPITAL | Age: 40
End: 2025-01-21

## 2025-01-21 DIAGNOSIS — R79.89 LOW TESTOSTERONE: ICD-10-CM

## 2025-01-21 LAB
DEPRECATED RDW RBC AUTO: 54 FL (ref 37–54)
ERYTHROCYTE [DISTWIDTH] IN BLOOD BY AUTOMATED COUNT: 16.1 % (ref 12.3–15.4)
HCT VFR BLD AUTO: 46.2 % (ref 37.5–51)
HGB BLD-MCNC: 14.7 G/DL (ref 13–17.7)
MCH RBC QN AUTO: 29.3 PG (ref 26.6–33)
MCHC RBC AUTO-ENTMCNC: 31.8 G/DL (ref 31.5–35.7)
MCV RBC AUTO: 92.2 FL (ref 79–97)
PLATELET # BLD AUTO: 433 10*3/MM3 (ref 140–450)
PMV BLD AUTO: 10.4 FL (ref 6–12)
RBC # BLD AUTO: 5.01 10*6/MM3 (ref 4.14–5.8)
TESTOST SERPL-MCNC: 327 NG/DL (ref 249–836)
WBC NRBC COR # BLD AUTO: 7.49 10*3/MM3 (ref 3.4–10.8)

## 2025-01-21 PROCEDURE — 84403 ASSAY OF TOTAL TESTOSTERONE: CPT

## 2025-01-21 PROCEDURE — 85027 COMPLETE CBC AUTOMATED: CPT

## 2025-01-21 PROCEDURE — 36415 COLL VENOUS BLD VENIPUNCTURE: CPT

## 2025-01-29 ENCOUNTER — TELEPHONE (OUTPATIENT)
Dept: UROLOGY | Facility: CLINIC | Age: 40
End: 2025-01-29

## 2025-01-29 ENCOUNTER — OFFICE VISIT (OUTPATIENT)
Dept: UROLOGY | Facility: CLINIC | Age: 40
End: 2025-01-29

## 2025-01-29 VITALS
HEART RATE: 102 BPM | OXYGEN SATURATION: 96 % | BODY MASS INDEX: 28.04 KG/M2 | HEIGHT: 68 IN | SYSTOLIC BLOOD PRESSURE: 142 MMHG | WEIGHT: 185 LBS | DIASTOLIC BLOOD PRESSURE: 81 MMHG

## 2025-01-29 DIAGNOSIS — R79.89 LOW TESTOSTERONE: Primary | ICD-10-CM

## 2025-01-29 DIAGNOSIS — N52.8 OTHER MALE ERECTILE DYSFUNCTION: ICD-10-CM

## 2025-01-29 RX ORDER — SILDENAFIL 100 MG/1
100 TABLET, FILM COATED ORAL AS NEEDED
Qty: 20 TABLET | Refills: 3 | Status: SHIPPED | OUTPATIENT
Start: 2025-01-29

## 2025-01-29 RX ORDER — CLOMIPHENE CITRATE 50 MG/1
25 TABLET ORAL DAILY
Qty: 30 TABLET | Refills: 2 | Status: SHIPPED | OUTPATIENT
Start: 2025-01-29

## 2025-01-29 NOTE — TELEPHONE ENCOUNTER
Pt came in today and didn't have insurance information with him, said his wife is a  and he wasn't able to get a hold of her to get that info before his appointment. Told him to call the office back with that info. I will follow up as well in a few days.

## 2025-01-29 NOTE — PROGRESS NOTES
Low Testosterone Office Visit      Patient Name: Sunny Drummond  : 1985   MRN: [id number]     Chief Complaint: Low Testosterone.    Chief Complaint   Patient presents with    Low Testosterone   .     Referring Provider: No ref. provider found    History of Present Illness: Mr. Drummond is a 39 y.o. male with history of low testosterone.  He is taking Clomid once daily from a friend, he is unsure of the dose.  He underwent lab work and presents today to discuss results.  He has been prescribed Cialis but would like to try Viagra again for erectile dysfunction.  He reports fatigue and low libido.    Labs 25  Total Testosterone; 327  Hematocrit; 46.2.     Subjective      Review of System: Review of Systems   Constitutional:  Positive for fatigue.   Genitourinary:         Low libido   All other systems reviewed and are negative.     I have reviewed the ROS documented by my clinical staff, I have updated appropriately and I agree. YEE Bauman    Past Medical History:  Past Medical History:   Diagnosis Date    Chronic back pain     Chronic fatigue 3/2/2021    Hyperprolactinemia 2020    Insomnia     Male hypertestosteronemia        Past Surgical History:  Past Surgical History:   Procedure Laterality Date    FINGER SURGERY Right        Medications:    Current Outpatient Medications:     cabergoline (DOSTINEX) 0.5 MG tablet, Take 1 tablet by mouth 2 (Two) Times a Week., Disp: 24 tablet, Rfl: 1    finasteride (PROPECIA) 1 MG tablet, TAKE ONE TABLET BY MOUTH DAILY, Disp: 90 tablet, Rfl: 3    ibuprofen (ADVIL,MOTRIN) 800 MG tablet, , Disp: , Rfl:     ketoconazole (NIZORAL) 2 % cream, Apply to affected areas in thins layer BID as needed, Disp: 30 g, Rfl: 1    ketoconazole (NIZORAL) 2 % shampoo, Apply  topically to the appropriate area as directed 2 (Two) Times a Week. Let sit 5 minutes on scalp, then rinse., Disp: 120 mL, Rfl: 1    sildenafil (VIAGRA) 100 MG tablet, Take 1 tablet by mouth As  Needed for Erectile Dysfunction., Disp: 20 tablet, Rfl: 3    traZODone (DESYREL) 100 MG tablet, Take 1 tablet by mouth Every Night., Disp: 90 tablet, Rfl: 1    clomiPHENE (CLOMID) 50 MG tablet, Take 0.5 tablets by mouth Daily., Disp: 30 tablet, Rfl: 2    Allergies:  No Known Allergies    Social History:  Social History     Socioeconomic History    Marital status:    Tobacco Use    Smoking status: Former     Current packs/day: 0.00     Average packs/day: 0.3 packs/day for 10.0 years (2.5 ttl pk-yrs)     Types: Cigarettes     Start date: 3/2/2008     Quit date: 3/2/2018     Years since quittin.9     Passive exposure: Past    Smokeless tobacco: Former     Types: Snuff    Tobacco comments:     just when drinking   Vaping Use    Vaping status: Former    Substances: Nicotine   Substance and Sexual Activity    Alcohol use: Yes     Comment: occasionally    Drug use: No    Sexual activity: Defer       Family History:  Family History   Problem Relation Age of Onset    Diabetes Mother     Hypertension Mother     Cancer Father     Alcohol abuse Father     Pancreatic cancer Father     Heart disease Maternal Grandfather     Alcohol abuse Maternal Grandfather        IPSS Questionnaire (AUA-7):  Over the past month…    1)  Incomplete Emptying:       How often have you had a sensation of not emptying you had the sensation of not emptying your bladder completely after you finished urinating?  2 - Less than half the time   2)  Frequency:       How often have you had the urinate again less than two hours after you finished urinating?  0 - Not at all   3)  Intermittency:       How often have you found you stopped and started again several times when you urinated?   0 - Not at all   4) Urgency:      How often have you found it difficult to postpone urination?  0 - Not at all   5) Weak Stream:      How often have you had a weak urinary stream?  0 - Not at all   6) Straining:       How often have you had to push or strain to  "begin urination?  0 - Not at all   7) Nocturia:      How many times did you most typically get up to urinate from the time you went to bed at night until the time you got up in the morning?  1 - 1 time   Total Score:  3   The International Prostate Symptom Score (IPSS) is used to screen, diagnose, track symptoms of benign prostatic hyperplasia (BPH).   0-7 (Mild Symptoms) 8-19 (Moderate) 20-35 (Severe)   Quality of Life (QoL):  If you were to spend the rest of your life with your urinary condition just the way it is now, how would you feel about that? 0-Delighted   Urine Leakage (Incontinence) 0-No Leakage       Objective     Physical Exam:   Vital Signs:   Vitals:    01/29/25 1536   BP: 142/81   Pulse: 102   SpO2: 96%   Weight: 83.9 kg (185 lb)   Height: 172.7 cm (68\")     Body mass index is 28.13 kg/m².     Physical Exam  Vitals and nursing note reviewed.   Constitutional:       Appearance: Normal appearance.   HENT:      Head: Normocephalic and atraumatic.      Nose: Nose normal.      Mouth/Throat:      Mouth: Mucous membranes are moist.   Eyes:      Pupils: Pupils are equal, round, and reactive to light.   Pulmonary:      Effort: Pulmonary effort is normal.   Abdominal:      General: Abdomen is flat.      Palpations: Abdomen is soft.   Musculoskeletal:         General: Normal range of motion.      Cervical back: Normal range of motion.   Skin:     General: Skin is warm and dry.      Capillary Refill: Capillary refill takes less than 2 seconds.   Neurological:      General: No focal deficit present.      Mental Status: He is alert.   Psychiatric:         Mood and Affect: Mood normal.       Labs:   Lab Results   Component Value Date    TESTOSTERONE 327.00 01/21/2025       Lab Results   Component Value Date    PSA 0.481 08/16/2024       Lab Results   Component Value Date    WBC 7.49 01/21/2025    HGB 14.7 01/21/2025    HCT 46.2 01/21/2025    MCV 92.2 01/21/2025     01/21/2025       Images:   No Images in the " past 120 days found..    Measures:   Tobacco:   Sunny Drummond  reports that he quit smoking about 6 years ago. His smoking use included cigarettes. He started smoking about 16 years ago. He has a 2.5 pack-year smoking history. He has been exposed to tobacco smoke. He has quit using smokeless tobacco.  His smokeless tobacco use included snuff.          Urine Incontinence: Patient reports that he is not currently experiencing any symptoms of urinary incontinence.    Assessment / Plan      Assessment/Plan:   Mr. Drummond is a 39 y.o. male who presented today with low testosterone.  He will begin Clomid 25mg once daily. Follow up in 3 months with labs prior. He defers testosterone injections at this time.     He will restart Viagra 100mg as needed. We discussed risk of priapism with medication.     Diagnoses and all orders for this visit:    1. Low testosterone (Primary)  -     CBC (No Diff); Future  -     Testosterone; Future  -     clomiPHENE (CLOMID) 50 MG tablet; Take 0.5 tablets by mouth Daily.  Dispense: 30 tablet; Refill: 2    2. Other male erectile dysfunction  -     sildenafil (VIAGRA) 100 MG tablet; Take 1 tablet by mouth As Needed for Erectile Dysfunction.  Dispense: 20 tablet; Refill: 3      Patient Education:   We discussed today the role testosterone plays for a male patient. I have indicated that low testosterone can be associated with metabolic syndrome, erectile dysfunction, coronary artery disease, diabetes, depression, osteoporosis, fatigue, low sex drive, poor sleep, high cholesterol, increased abdominal fat, muscle loss, irritability, hot flashes, and inability to concentrate.     Treatment options were discussed including SERMs, aromatase inhibitors, testosterone injections every 2-3 weeks and long-acting injections every 10 weeks.    I explained the risks, benefits, and alternatives to testosterone therapies. I informed him of the potential SEs of chest and leg swelling, increased acne, change in  mood, polycythemia, and worsening of undiagnosed prostate cancer.     Follow Up:   Return in about 3 months (around 4/29/2025) for Labs prior .    I spent approximately 20 minutes providing clinical care for this patient; including review of patient's chart and provider documentation, face to face time spent with patient in examination room (obtaining history, performing physical exam, discussing diagnosis and management options), placing orders, and completing patient documentation.     YEE Baxter  [facility]

## 2025-01-31 ENCOUNTER — TELEPHONE (OUTPATIENT)
Dept: UROLOGY | Facility: CLINIC | Age: 40
End: 2025-01-31

## 2025-01-31 NOTE — TELEPHONE ENCOUNTER
Caller: CRYSTAL CANTU    Relationship: SELF    Best call back number: [phone] (home)      What is the best time to reach you: ANY    Who are you requesting to speak with (clinical staff, provider,  specific staff member): BENJI    Do you know the name of the person who called: PT CALLED    What was the call regarding: PT CALLED TO STATE THAT clomiPHENE (CLOMID) 50 MG tablet WAS OVER $200 A MONTH. IS THERE ANYTHING COMPARABLE THAT MAY BE CHEAPER? PLEASE CALL PT BACK TO DISCUSS. THANK YOU.

## 2025-02-04 ENCOUNTER — TELEPHONE (OUTPATIENT)
Dept: UROLOGY | Facility: CLINIC | Age: 40
End: 2025-02-04

## 2025-02-12 ENCOUNTER — TELEPHONE (OUTPATIENT)
Dept: INTERNAL MEDICINE | Facility: CLINIC | Age: 40
End: 2025-02-12

## 2025-02-12 NOTE — TELEPHONE ENCOUNTER
HUB to relay:  for pt to return call, needing to reschedule his physical appt on 2/27/25. Dr. Pereyra will have openings for Tuesday 2/25/25 if pt prefers.

## 2025-02-17 ENCOUNTER — TELEPHONE (OUTPATIENT)
Dept: INTERNAL MEDICINE | Facility: CLINIC | Age: 40
End: 2025-02-17

## 2025-02-17 NOTE — TELEPHONE ENCOUNTER
HUB TO READ      LEFT VOICEMAIL LETTING THE PATIENT KNOW THAT  WILL BE OUT OF THE OFFICE ON 02- AND WE HAVE RESCHEDULED THE PATIENT TO 03- @ 9AM.    LEFT OFFICE NUMBER FOR PATIENT IF THIS DOES NOT WORK.      MAILED OUT REMINDER.

## 2025-02-24 ENCOUNTER — OFFICE VISIT (OUTPATIENT)
Dept: INTERNAL MEDICINE | Facility: CLINIC | Age: 40
End: 2025-02-24

## 2025-02-24 VITALS
WEIGHT: 190 LBS | SYSTOLIC BLOOD PRESSURE: 124 MMHG | HEART RATE: 84 BPM | DIASTOLIC BLOOD PRESSURE: 80 MMHG | RESPIRATION RATE: 16 BRPM | BODY MASS INDEX: 28.79 KG/M2 | HEIGHT: 68 IN | OXYGEN SATURATION: 95 %

## 2025-02-24 DIAGNOSIS — F41.1 GENERALIZED ANXIETY DISORDER: ICD-10-CM

## 2025-02-24 DIAGNOSIS — F10.10 ALCOHOL ABUSE: Primary | ICD-10-CM

## 2025-02-24 DIAGNOSIS — F32.9 REACTIVE DEPRESSION: ICD-10-CM

## 2025-02-24 DIAGNOSIS — F10.20 UNCOMPLICATED ALCOHOL DEPENDENCE: ICD-10-CM

## 2025-02-24 RX ORDER — DISULFIRAM 250 MG/1
250 TABLET ORAL DAILY
Qty: 90 TABLET | Refills: 1 | Status: SHIPPED | OUTPATIENT
Start: 2025-02-24

## 2025-02-24 NOTE — PROGRESS NOTES
Office Note     Name: Sunny Drummond    : 1985     MRN: [id number]     Chief Complaint  Med Management    Subjective     History of Present Illness:  Sunny Drummond is a 39 y.o. male who presents today for medication management   Has been drinking- to relieve stress   Has been drinking effecting work - has had on and off relationship with alcohol in the past   Feels he drinks out of boredom to calm anxiousness   10-15 drinks- beers recently   Has been more in the past - 24 beers + liquor     Has not drank in last 3 days- tremors, sweats- denies seizures   No current symptoms of withdrawal   Family hx of alcoholism     Has gone to AA - recently went today     Has not been working- stress from bills and home   Would like to try antabuse or naltrexone   Will go to the [facility] for full assessment - concerns for cost     Has been having increased anxiety and sleep concerns   Concerns for anxiety and depression   Does have appointment in 2 weeks for physical will trial medication to help stop alcohol abuse and discuss anxiety at next visit   Does have good support at home      Review of Systems:   Review of Systems   Constitutional:  Negative for chills and fever.   Respiratory:  Negative for cough and shortness of breath.    Cardiovascular:  Negative for chest pain, palpitations and leg swelling.   Gastrointestinal:  Negative for constipation, diarrhea, nausea and vomiting.   Genitourinary:  Negative for dysuria.   Musculoskeletal:  Negative for gait problem.   Neurological:  Negative for tremors.   Psychiatric/Behavioral:  Positive for dysphoric mood. The patient is nervous/anxious.        Past Medical History:   Past Medical History:   Diagnosis Date    Chronic back pain     Chronic fatigue 3/2/2021    Hyperprolactinemia 2020    Insomnia     Male hypertestosteronemia        Past Surgical History:   Past Surgical History:   Procedure Laterality Date    FINGER SURGERY Right        Immunizations:    Immunization History   Administered Date(s) Administered    Tdap 2023        Medications:     Current Outpatient Medications:     clomiPHENE (CLOMID) 50 MG tablet, Take 0.5 tablets by mouth Daily., Disp: 30 tablet, Rfl: 2    finasteride (PROPECIA) 1 MG tablet, TAKE ONE TABLET BY MOUTH DAILY, Disp: 90 tablet, Rfl: 3    ibuprofen (ADVIL,MOTRIN) 800 MG tablet, , Disp: , Rfl:     ketoconazole (NIZORAL) 2 % cream, Apply to affected areas in thins layer BID as needed, Disp: 30 g, Rfl: 1    ketoconazole (NIZORAL) 2 % shampoo, Apply  topically to the appropriate area as directed 2 (Two) Times a Week. Let sit 5 minutes on scalp, then rinse., Disp: 120 mL, Rfl: 1    sildenafil (VIAGRA) 100 MG tablet, Take 1 tablet by mouth As Needed for Erectile Dysfunction., Disp: 20 tablet, Rfl: 3    traZODone (DESYREL) 100 MG tablet, Take 1 tablet by mouth Every Night., Disp: 90 tablet, Rfl: 1    disulfiram (ANTABUSE) 250 MG tablet, Take 1 tablet by mouth Daily., Disp: 90 tablet, Rfl: 1    Allergies:   No Known Allergies    Family History:   Family History   Problem Relation Age of Onset    Diabetes Mother     Hypertension Mother     Cancer Father     Alcohol abuse Father     Pancreatic cancer Father     Heart disease Maternal Grandfather     Alcohol abuse Maternal Grandfather        Social History:   Social History     Socioeconomic History    Marital status:    Tobacco Use    Smoking status: Former     Current packs/day: 0.00     Average packs/day: 0.3 packs/day for 10.0 years (2.5 ttl pk-yrs)     Types: Cigarettes     Start date: 3/2/2008     Quit date: 3/2/2018     Years since quittin.9     Passive exposure: Past    Smokeless tobacco: Former     Types: Snuff    Tobacco comments:     just when drinking   Vaping Use    Vaping status: Former    Substances: Nicotine   Substance and Sexual Activity    Alcohol use: Yes     Comment: occasionally    Drug use: No    Sexual activity: Defer         Objective     Vital Signs  BP  "124/80   Pulse 84   Resp 16   Ht 172.7 cm (68\")   Wt 86.2 kg (190 lb)   SpO2 95%   BMI 28.89 kg/m²   Estimated body mass index is 28.89 kg/m² as calculated from the following:    Height as of this encounter: 172.7 cm (68\").    Weight as of this encounter: 86.2 kg (190 lb).    BMI is >= 25 and <30. (Overweight) The following options were offered after discussion;: exercise counseling/recommendations and nutrition counseling/recommendations      Physical Exam  Vitals and nursing note reviewed.   Constitutional:       General: He is not in acute distress.     Appearance: Normal appearance.   HENT:      Head: Normocephalic and atraumatic.   Cardiovascular:      Rate and Rhythm: Normal rate and regular rhythm.      Heart sounds: Normal heart sounds.   Pulmonary:      Effort: Pulmonary effort is normal. No respiratory distress.      Breath sounds: Normal breath sounds.   Skin:     General: Skin is warm and dry.   Neurological:      General: No focal deficit present.      Mental Status: He is alert and oriented to person, place, and time.          Procedures     Assessment and Plan   Diagnosis Discussed   Continue to monitor   Plenty of fluids     Carlos for assessment -alcohol   Take medication as directed   If symptoms worsen or persist please seek further evaluation     1. Alcohol abuse  - disulfiram (ANTABUSE) 250 MG tablet; Take 1 tablet by mouth Daily.  Dispense: 90 tablet; Refill: 1    2. Uncomplicated alcohol dependence  - disulfiram (ANTABUSE) 250 MG tablet; Take 1 tablet by mouth Daily.  Dispense: 90 tablet; Refill: 1  Last drink approx 48-72 hours  No current symptoms of withdrawal     3. Generalized anxiety disorder  Will discuss more in depth at next visit. Working on alcohol cessation  Using alcohol to mask anxiety- will discuss medication and therapy options at next visit   Patient will work with Carlos [facility]     4. Reactive depression  Will discuss more in depth at next visit. Working on alcohol " cessation  Using alcohol to mask anxiety- will discuss medication and therapy options at next visit   Patient will work with [facility]        Follow Up  Return if symptoms worsen or fail to improve, for Next scheduled follow up- Wellness with labs .    MD MICKEY JoyceE PC [facility]  [address] [place] [id number] [phone]

## 2025-02-24 NOTE — PATIENT INSTRUCTIONS
Diagnosis Discussed   Continue to monitor   Plenty of fluids  HERMES Gonzalez for assessment -alcohol   Take medication as directed   If symptoms worsen or persist please seek further evaluation

## 2025-02-25 ENCOUNTER — TELEPHONE (OUTPATIENT)
Dept: INTERNAL MEDICINE | Facility: CLINIC | Age: 40
End: 2025-02-25

## 2025-02-25 PROBLEM — F41.1 GENERALIZED ANXIETY DISORDER: Status: ACTIVE | Noted: 2025-02-25

## 2025-02-25 NOTE — TELEPHONE ENCOUNTER
Let pt know Dr. Pereyra or [facility] doesn't prescribe and would need to find psychiatry that would be willing to prescribe and administer. Pt verbalized understanding.

## 2025-02-25 NOTE — TELEPHONE ENCOUNTER
Caller: Sunny Drummond    Relationship: Self    Best call back number: [phone]     What medication are you requesting: VIVITROL    What are your current symptoms: NUMB THE EFFECTS OF ALCOHOL    Have you had these symptoms before:    [x] Yes  [] No    Have you been treated for these symptoms before:   [] Yes  [x] No    If a prescription is needed, what is your preferred pharmacy and phone number: [facility] [id number] - [place], [place] - [address] AT [facility] [address]  - [phone]  - [phone] FX     Additional notes:AFTER SEEING THE SPECIALIST, THAT PROVIDER RECOMMENDED THIS MEDICATION TO THE PATIENT.

## 2025-03-03 ENCOUNTER — LAB (OUTPATIENT)
Dept: LAB | Facility: HOSPITAL | Age: 40
End: 2025-03-03
Payer: MEDICAID

## 2025-03-03 DIAGNOSIS — R79.89 LOW TESTOSTERONE: ICD-10-CM

## 2025-03-03 LAB
DEPRECATED RDW RBC AUTO: 53 FL (ref 37–54)
ERYTHROCYTE [DISTWIDTH] IN BLOOD BY AUTOMATED COUNT: 15.9 % (ref 12.3–15.4)
HCT VFR BLD AUTO: 40.9 % (ref 37.5–51)
HGB BLD-MCNC: 13.9 G/DL (ref 13–17.7)
MCH RBC QN AUTO: 31 PG (ref 26.6–33)
MCHC RBC AUTO-ENTMCNC: 34 G/DL (ref 31.5–35.7)
MCV RBC AUTO: 91.1 FL (ref 79–97)
PLATELET # BLD AUTO: 317 10*3/MM3 (ref 140–450)
PMV BLD AUTO: 11.7 FL (ref 6–12)
RBC # BLD AUTO: 4.49 10*6/MM3 (ref 4.14–5.8)
TESTOST SERPL-MCNC: 40.5 NG/DL (ref 249–836)
WBC NRBC COR # BLD AUTO: 5.21 10*3/MM3 (ref 3.4–10.8)

## 2025-03-03 PROCEDURE — 85027 COMPLETE CBC AUTOMATED: CPT

## 2025-03-03 PROCEDURE — 36415 COLL VENOUS BLD VENIPUNCTURE: CPT

## 2025-03-03 PROCEDURE — 84403 ASSAY OF TOTAL TESTOSTERONE: CPT

## 2025-03-12 DIAGNOSIS — E22.1 HYPERPROLACTINEMIA: ICD-10-CM

## 2025-03-12 DIAGNOSIS — R53.82 CHRONIC FATIGUE: ICD-10-CM

## 2025-03-13 RX ORDER — CABERGOLINE 0.5 MG/1
0.5 TABLET ORAL 2 TIMES WEEKLY
Qty: 24 TABLET | Refills: 1 | OUTPATIENT
Start: 2025-03-13

## 2025-04-22 ENCOUNTER — TELEPHONE (OUTPATIENT)
Dept: INTERNAL MEDICINE | Facility: CLINIC | Age: 40
End: 2025-04-22

## 2025-04-22 NOTE — TELEPHONE ENCOUNTER
Caller: Sunny Drummond    Relationship: Self    Best call back number: [phone]    What medication are you requesting: TADALAFIL      Have you had these symptoms before:    [] Yes  [] No    Have you been treated for these symptoms before:   [x] Yes  [] No    If a prescription is needed, what is your preferred pharmacy and phone number: [facility] [id number] - [place], [place] - [address] [facility] [address]  - [phone]  - [phone] FX     Additional notes:PATIENT STATES THAT HE HAS BEEN ON THIS BEFORE AND DOES NOT HAVE INSURANCE RIGHT NOW SO HE CANNOT COME IN

## 2025-04-22 NOTE — TELEPHONE ENCOUNTER
Let pt know urology has been prescribing this type medication, would need to reach out to their office.

## 2025-04-29 ENCOUNTER — TELEPHONE (OUTPATIENT)
Dept: UROLOGY | Facility: CLINIC | Age: 40
End: 2025-04-29

## 2025-04-29 NOTE — TELEPHONE ENCOUNTER
Caller: CRYSTAL CANTU    Relationship: SELF    Best call back number: [phone]    Which medication are you concerned about: sildenafil (VIAGRA) 100 MG tablet / [facility] [id number] - [place], [place] - [address] AT [facility] [address] - [phone] PH - [phone] FX  [address] [facility] [address]  Phone: [phone] Fax: [phone]    Who prescribed you this medication: BENJI CHANEY    When did you start taking this medication: 1/29/25    What are your concerns: SAYS THEY WILL NOT REFILL MEDICATION WITHOUT A NEW PRESCIRPTION.    How long have you had these concerns: TODAY

## 2025-04-29 NOTE — TELEPHONE ENCOUNTER
PT WOULD LIKE TO GET REFILL BUT CANNOT COME IN FOR APPT DUE TO NOT HAVING INSURANCE ANY LONGER AND WOULD NOT BE ABLE TO AFFORD COMING IN FOR A VISIT.

## 2025-04-30 DIAGNOSIS — N52.8 OTHER MALE ERECTILE DYSFUNCTION: ICD-10-CM

## 2025-04-30 RX ORDER — SILDENAFIL 100 MG/1
100 TABLET, FILM COATED ORAL AS NEEDED
Qty: 30 TABLET | Refills: 2 | Status: SHIPPED | OUTPATIENT
Start: 2025-04-30 | End: 2025-05-02

## 2025-05-02 ENCOUNTER — TELEPHONE (OUTPATIENT)
Dept: UROLOGY | Facility: CLINIC | Age: 40
End: 2025-05-02

## 2025-05-02 DIAGNOSIS — N52.9 ERECTILE DYSFUNCTION, UNSPECIFIED ERECTILE DYSFUNCTION TYPE: Primary | ICD-10-CM

## 2025-05-02 RX ORDER — TADALAFIL 20 MG/1
20 TABLET ORAL DAILY PRN
COMMUNITY
End: 2025-05-02 | Stop reason: SDUPTHER

## 2025-05-02 RX ORDER — TADALAFIL 20 MG/1
20 TABLET ORAL AS NEEDED
Qty: 30 TABLET | Refills: 0 | Status: SHIPPED | OUTPATIENT
Start: 2025-05-02

## 2025-05-02 NOTE — TELEPHONE ENCOUNTER
Patient requesting Cialis 20mg to be sent in.    I called and spoke with pharmacy staff just to verify patient has not picked up new RX of sildenafil that was sent in on 4/30.   Patient has not..

## 2025-05-02 NOTE — TELEPHONE ENCOUNTER
Caller: Sunny Drummond     Relationship: SELF    Best call back number: [phone]     Requested Prescriptions: Cialis 20MG  Requested Prescriptions      No prescriptions requested or ordered in this encounter        Pharmacy where request should be sent:  [facility] [id number] [place], [place] - [address] AT [facility] [address] - [phone] PH - [phone] FX     Last office visit with prescribing clinician: 1/29/2025   Last telemedicine visit with prescribing clinician: Visit date not found   Next office visit with prescribing clinician: 7/23/2025     Additional details provided by patient: PT IS GOING OUT OF [facility] AND IS NEEDING IT ASAP.    PT STATES HE REQUESTED THAT MEDICATION PREVIOUSLY NOT THE SILDENAFIL    Does the patient have less than a 3 day supply:  [x] Yes  [] No    Would you like a call back once the refill request has been completed: [] Yes [x] No    If the office needs to give you a call back, can they leave a voicemail: [] Yes [x] No    Kait Lr Rep   05/02/25 09:10 EDT

## 2025-06-05 DIAGNOSIS — L64.9 MALE PATTERN BALDNESS: ICD-10-CM

## 2025-06-05 RX ORDER — FINASTERIDE 1 MG/1
1 TABLET, FILM COATED ORAL DAILY
Qty: 90 TABLET | Refills: 3 | OUTPATIENT
Start: 2025-06-05

## 2025-06-05 NOTE — TELEPHONE ENCOUNTER
Last filled 10/3/23 Babak    finasteride (PROPECIA) 1 MG    90 w/ 3 refills    LOV 2/24/25  NOV none    Please advise

## 2025-06-05 NOTE — TELEPHONE ENCOUNTER
Caller: Bhanu Sunny Mitchel    Relationship: Self    Best call back number: [phone]     Requested Prescriptions:   Requested Prescriptions     Pending Prescriptions Disp Refills    finasteride (PROPECIA) 1 MG tablet 90 tablet 3     Sig: Take 1 tablet by mouth Daily.        Pharmacy where request should be sent: [facility] [id number] [place], [place] - [address] AT [facility] [address] - [phone]  - [phone] FX     Last office visit with prescribing clinician: 2/24/2025   Last telemedicine visit with prescribing clinician: Visit date not found   Next office visit with prescribing clinician: Visit date not found     Additional details provided by patient: PATIENT IS OUT.     Does the patient have less than a 3 day supply:  [x] Yes  [] No    Would you like a call back once the refill request has been completed: [] Yes [x] No    If the office needs to give you a call back, can they leave a voicemail: [] Yes [x] No    Cadance Dunaway, RegSched Rep   06/05/25 16:52 EDT

## 2025-06-09 RX ORDER — FINASTERIDE 1 MG/1
1 TABLET, FILM COATED ORAL DAILY
Qty: 90 TABLET | Refills: 3 | OUTPATIENT
Start: 2025-06-09

## 2025-06-09 NOTE — TELEPHONE ENCOUNTER
I went ahead and scheduled the patient for June 19th @ 12:30. Provider did not have any openings until next month. Please advise this is ok.

## 2025-06-09 NOTE — TELEPHONE ENCOUNTER
Name: Sunny Drummond Mitchel    Relationship: Self    Best Callback Number: [phone]     HUB PROVIDED THE RELAY MESSAGE FROM THE OFFICE   PATIENT VOICED UNDERSTANDING AND HAS NO FURTHER QUESTIONS AT THIS TIME    ADDITIONAL INFORMATION:N/A

## 2025-06-10 RX ORDER — FINASTERIDE 1 MG/1
1 TABLET, FILM COATED ORAL DAILY
Qty: 30 TABLET | Refills: 0 | Status: SHIPPED | OUTPATIENT
Start: 2025-06-10

## 2025-07-10 ENCOUNTER — OFFICE VISIT (OUTPATIENT)
Dept: INTERNAL MEDICINE | Facility: CLINIC | Age: 40
End: 2025-07-10
Payer: COMMERCIAL

## 2025-07-10 VITALS
HEIGHT: 68 IN | BODY MASS INDEX: 28.04 KG/M2 | HEART RATE: 103 BPM | TEMPERATURE: 98.2 F | WEIGHT: 185 LBS | OXYGEN SATURATION: 95 % | DIASTOLIC BLOOD PRESSURE: 88 MMHG | SYSTOLIC BLOOD PRESSURE: 132 MMHG

## 2025-07-10 DIAGNOSIS — Z13.21 SCREENING FOR ENDOCRINE, NUTRITIONAL, METABOLIC AND IMMUNITY DISORDER: ICD-10-CM

## 2025-07-10 DIAGNOSIS — M54.6 CHRONIC RIGHT-SIDED THORACIC BACK PAIN: ICD-10-CM

## 2025-07-10 DIAGNOSIS — Z13.228 SCREENING FOR ENDOCRINE, NUTRITIONAL, METABOLIC AND IMMUNITY DISORDER: ICD-10-CM

## 2025-07-10 DIAGNOSIS — F51.01 PRIMARY INSOMNIA: ICD-10-CM

## 2025-07-10 DIAGNOSIS — Z13.29 SCREENING FOR ENDOCRINE, NUTRITIONAL, METABOLIC AND IMMUNITY DISORDER: ICD-10-CM

## 2025-07-10 DIAGNOSIS — Z13.220 SCREENING, LIPID: ICD-10-CM

## 2025-07-10 DIAGNOSIS — Z13.0 SCREENING FOR ENDOCRINE, NUTRITIONAL, METABOLIC AND IMMUNITY DISORDER: ICD-10-CM

## 2025-07-10 DIAGNOSIS — N52.9 ERECTILE DYSFUNCTION, UNSPECIFIED ERECTILE DYSFUNCTION TYPE: ICD-10-CM

## 2025-07-10 DIAGNOSIS — Z00.00 WELLNESS EXAMINATION: Primary | ICD-10-CM

## 2025-07-10 DIAGNOSIS — Z12.5 SCREENING PSA (PROSTATE SPECIFIC ANTIGEN): ICD-10-CM

## 2025-07-10 DIAGNOSIS — L64.9 MALE PATTERN BALDNESS: ICD-10-CM

## 2025-07-10 DIAGNOSIS — G89.29 CHRONIC RIGHT-SIDED THORACIC BACK PAIN: ICD-10-CM

## 2025-07-10 RX ORDER — FINASTERIDE 1 MG/1
1 TABLET, FILM COATED ORAL DAILY
Qty: 30 TABLET | Refills: 2 | Status: SHIPPED | OUTPATIENT
Start: 2025-07-10

## 2025-07-10 RX ORDER — METHYLPREDNISOLONE ACETATE 40 MG/ML
40 INJECTION, SUSPENSION INTRA-ARTICULAR; INTRALESIONAL; INTRAMUSCULAR; SOFT TISSUE ONCE
Status: COMPLETED | OUTPATIENT
Start: 2025-07-10 | End: 2025-07-10

## 2025-07-10 RX ORDER — TRAZODONE HYDROCHLORIDE 100 MG/1
100 TABLET ORAL NIGHTLY
Qty: 90 TABLET | Refills: 1 | Status: SHIPPED | OUTPATIENT
Start: 2025-07-10

## 2025-07-10 RX ORDER — TADALAFIL 20 MG/1
20 TABLET ORAL AS NEEDED
Qty: 30 TABLET | Refills: 0 | Status: SHIPPED | OUTPATIENT
Start: 2025-07-10

## 2025-07-10 RX ADMIN — METHYLPREDNISOLONE ACETATE 40 MG: 40 INJECTION, SUSPENSION INTRA-ARTICULAR; INTRALESIONAL; INTRAMUSCULAR; SOFT TISSUE at 13:32

## 2025-07-10 NOTE — PROGRESS NOTES
Office Note     Name: Sunny Drummond    : 1985     MRN: [id number]     Chief Complaint  Annual Exam    Subjective     History of Present Illness:  Sunny Drummond is a 40 y.o. male who presents today for physical will return for fasting labs   Need for refills   Doing well overall    Has been working with Urology     PMH-  Chronic Fatigue   Hyperprolactinemia   Male hypertestosteronemia   Gynecomastia   ED   Chronic back pain   Male pattern baldness   Sleep difficulty      Meds-  Finasteride 1mg daily   Tadalafil 20mg as needed   Trazodone 100mg nightly   Ibuprofen as needed     Family Hx-   Maternal GM- - HTN, smoker   Maternal GF- - HTN, heart attack, alcohol, smoking   Paternal GM- alive- unsure   Paternal GF-    Mother- alive- prediabetes, HTN   Father- - cancer- pancreatic- alcohol   Sister- alive- Healthy      Alcohol 10-20 per day x 20 years. Non currently- very minimal - 1 week out of month that's heavy- for most part has control over it   Smoking - vape occasionally- nicotine  Drug use - none   Job - - homes and businesses - liquidation   Stress - 8/10    Sun Exposure - will try. No dermatology         Dental/Eye exams - up to date. Vision is stable   Diet/Exercise- Diet- Healthy   Exercise- 6 days per week- 1 hour in gym sometimes twice a day      Colonoscopy/Cologuard- not yet- will start at 45      Immunizations  Tdap- up to date   Flu- postponed   COVID- none     Review of Systems:   Review of Systems   Constitutional:  Negative for chills and fever.   HENT:  Negative for dental problem, trouble swallowing and voice change.    Eyes:  Negative for visual disturbance.   Respiratory:  Negative for cough, chest tightness, shortness of breath and wheezing.    Cardiovascular:  Negative for chest pain, palpitations and leg swelling.   Gastrointestinal:  Negative for abdominal pain, blood in stool, constipation, diarrhea, nausea and vomiting.    Genitourinary:  Positive for erectile dysfunction. Negative for dysuria.   Musculoskeletal:  Positive for back pain. Negative for gait problem.   Skin:  Negative for rash.   Neurological:  Negative for light-headedness and headache.   Psychiatric/Behavioral:  Negative for dysphoric mood.        Past Medical History:   Past Medical History:   Diagnosis Date    Chronic back pain     Chronic fatigue 3/2/2021    Hyperprolactinemia 11/25/2020    Insomnia     Male hypertestosteronemia        Past Surgical History:   Past Surgical History:   Procedure Laterality Date    FINGER SURGERY Right        Immunizations:   Immunization History   Administered Date(s) Administered    Tdap 07/18/2023        Medications:     Current Outpatient Medications:     finasteride (PROPECIA) 1 MG tablet, Take 1 tablet by mouth Daily., Disp: 30 tablet, Rfl: 2    ibuprofen (ADVIL,MOTRIN) 800 MG tablet, , Disp: , Rfl:     ketoconazole (NIZORAL) 2 % cream, Apply to affected areas in thins layer BID as needed, Disp: 30 g, Rfl: 1    ketoconazole (NIZORAL) 2 % shampoo, Apply  topically to the appropriate area as directed 2 (Two) Times a Week. Let sit 5 minutes on scalp, then rinse., Disp: 120 mL, Rfl: 1    tadalafil (CIALIS) 20 MG tablet, Take 1 tablet by mouth As Needed for Erectile Dysfunction., Disp: 30 tablet, Rfl: 0    traZODone (DESYREL) 100 MG tablet, Take 1 tablet by mouth Every Night., Disp: 90 tablet, Rfl: 1    clomiPHENE (CLOMID) 50 MG tablet, Take 0.5 tablets by mouth Daily. (Patient not taking: Reported on 7/10/2025), Disp: 30 tablet, Rfl: 2    Allergies:   No Known Allergies    Family History:   Family History   Problem Relation Age of Onset    Diabetes Mother     Hypertension Mother     Cancer Father     Alcohol abuse Father     Pancreatic cancer Father     Heart disease Maternal Grandfather     Alcohol abuse Maternal Grandfather        Social History:   Social History     Socioeconomic History    Marital status:    Tobacco Use  "   Smoking status: Former     Current packs/day: 0.00     Average packs/day: 0.3 packs/day for 10.0 years (2.5 ttl pk-yrs)     Types: Cigarettes     Start date: 3/2/2008     Quit date: 3/2/2018     Years since quittin.4     Passive exposure: Past    Smokeless tobacco: Former     Types: Snuff    Tobacco comments:     just when drinking   Vaping Use    Vaping status: Some Days    Substances: Nicotine   Substance and Sexual Activity    Alcohol use: Yes     Comment: occasionally    Drug use: No    Sexual activity: Defer         Objective     Vital Signs  /88   Pulse 103   Temp 98.2 °F (36.8 °C) (Temporal)   Ht 173 cm (68.11\")   Wt 83.9 kg (185 lb)   SpO2 95%   BMI 28.04 kg/m²   Estimated body mass index is 28.04 kg/m² as calculated from the following:    Height as of this encounter: 173 cm (68.11\").    Weight as of this encounter: 83.9 kg (185 lb).            Physical Exam  Vitals and nursing note reviewed.   Constitutional:       General: He is not in acute distress.     Appearance: Normal appearance.   HENT:      Head: Normocephalic and atraumatic.      Right Ear: Tympanic membrane normal.      Left Ear: Tympanic membrane normal.      Nose: Nose normal.      Mouth/Throat:      Mouth: Mucous membranes are moist.   Eyes:      Extraocular Movements: Extraocular movements intact.      Conjunctiva/sclera: Conjunctivae normal.      Pupils: Pupils are equal, round, and reactive to light.   Cardiovascular:      Rate and Rhythm: Normal rate and regular rhythm.      Heart sounds: Normal heart sounds.   Pulmonary:      Effort: Pulmonary effort is normal. No respiratory distress.      Breath sounds: Normal breath sounds.   Abdominal:      General: Bowel sounds are normal.      Palpations: Abdomen is soft.   Musculoskeletal:         General: Normal range of motion.      Cervical back: Normal range of motion.      Comments: Moving all extremities    Skin:     General: Skin is warm and dry.   Neurological:      " General: No focal deficit present.      Mental Status: He is alert and oriented to person, place, and time.   Psychiatric:         Mood and Affect: Mood normal.         Behavior: Behavior normal.         Thought Content: Thought content normal.         Judgment: Judgment normal.          Procedures     Assessment and Plan   Diagnosis Discussed   Continue to monitor   Plenty of fluids, monitor diet and exercise   Labs ordered will notify of results   Immunizations up to date   Follow up with Urology   Take medications as instructed- refills today   Steroid shot today   Warm compresses as needed   Follow up as directed   If symptoms worsen or persist please seek further evaluation     1. Wellness examination    2. Primary insomnia  - traZODone (DESYREL) 100 MG tablet; Take 1 tablet by mouth Every Night.  Dispense: 90 tablet; Refill: 1    3. Male pattern baldness  - finasteride (PROPECIA) 1 MG tablet; Take 1 tablet by mouth Daily.  Dispense: 30 tablet; Refill: 2    4. Erectile dysfunction, unspecified erectile dysfunction type  - tadalafil (CIALIS) 20 MG tablet; Take 1 tablet by mouth As Needed for Erectile Dysfunction.  Dispense: 30 tablet; Refill: 0    5. Screening for endocrine, nutritional, metabolic and immunity disorder  - CBC (No Diff); Future  - Comprehensive Metabolic Panel; Future  - Hemoglobin A1c; Future  - TSH Rfx On Abnormal To Free T4; Future    6. Screening, lipid  - Lipid Panel; Future    7. Screening PSA (prostate specific antigen)  - PSA Screen; Future    8. Chronic right-sided thoracic back pain  - methylPREDNISolone acetate (DEPO-medrol) injection 40 mg       Follow Up  Return in about 1 year (around 7/10/2026), or if symptoms worsen or fail to improve, for Annual, fasting labs.    Daniela Pereyra MD  [facility]  [address] [place] [id number] [phone]

## 2025-07-10 NOTE — PATIENT INSTRUCTIONS
Diagnosis Discussed   Continue to monitor   Plenty of fluids, monitor diet and exercise   Labs ordered will notify of results   Immunizations up to date   Follow up with Urology   Take medications as instructed- refills today   Steroid shot today   Warm compresses as needed   Follow up as directed   If symptoms worsen or persist please seek further evaluation

## 2025-08-06 DIAGNOSIS — R79.89 LOW TESTOSTERONE: Primary | ICD-10-CM

## 2025-08-12 ENCOUNTER — TELEPHONE (OUTPATIENT)
Dept: UROLOGY | Facility: CLINIC | Age: 40
End: 2025-08-12

## 2025-08-18 DIAGNOSIS — N52.9 ERECTILE DYSFUNCTION, UNSPECIFIED ERECTILE DYSFUNCTION TYPE: ICD-10-CM

## 2025-08-18 RX ORDER — TADALAFIL 20 MG/1
20 TABLET ORAL AS NEEDED
Qty: 30 TABLET | Refills: 0 | Status: SHIPPED | OUTPATIENT
Start: 2025-08-18